# Patient Record
Sex: MALE | Race: WHITE | NOT HISPANIC OR LATINO | Employment: UNEMPLOYED | URBAN - METROPOLITAN AREA
[De-identification: names, ages, dates, MRNs, and addresses within clinical notes are randomized per-mention and may not be internally consistent; named-entity substitution may affect disease eponyms.]

---

## 2017-10-29 ENCOUNTER — HOSPITAL ENCOUNTER (EMERGENCY)
Facility: HOSPITAL | Age: 27
Discharge: HOME | End: 2017-10-29
Attending: EMERGENCY MEDICINE | Admitting: EMERGENCY MEDICINE

## 2017-10-29 VITALS
RESPIRATION RATE: 18 BRPM | OXYGEN SATURATION: 98 % | TEMPERATURE: 98.1 F | HEIGHT: 67 IN | DIASTOLIC BLOOD PRESSURE: 91 MMHG | WEIGHT: 152 LBS | SYSTOLIC BLOOD PRESSURE: 135 MMHG | HEART RATE: 124 BPM | BODY MASS INDEX: 23.86 KG/M2

## 2017-10-29 DIAGNOSIS — F14.10 COCAINE ABUSE (HCC): Primary | ICD-10-CM

## 2017-10-29 PROCEDURE — 99282 EMERGENCY DEPT VISIT SF MDM: CPT

## 2017-10-29 PROCEDURE — 99281 EMR DPT VST MAYX REQ PHY/QHP: CPT

## 2018-09-01 ENCOUNTER — HOSPITAL ENCOUNTER (EMERGENCY)
Facility: HOSPITAL | Age: 28
Discharge: HOME | End: 2018-09-01
Attending: EMERGENCY MEDICINE | Admitting: EMERGENCY MEDICINE

## 2018-09-01 VITALS
BODY MASS INDEX: 25.54 KG/M2 | OXYGEN SATURATION: 98 % | DIASTOLIC BLOOD PRESSURE: 92 MMHG | SYSTOLIC BLOOD PRESSURE: 131 MMHG | TEMPERATURE: 98.3 F | HEART RATE: 114 BPM | HEIGHT: 67 IN | WEIGHT: 162.7 LBS | RESPIRATION RATE: 20 BRPM

## 2018-09-01 DIAGNOSIS — M79.604 RIGHT LEG PAIN: Primary | ICD-10-CM

## 2018-09-01 LAB
ALBUMIN SERPL-MCNC: 4.7 G/DL (ref 3.5–5.2)
ALBUMIN/GLOB SERPL: 2 G/DL
ALP SERPL-CCNC: 56 U/L (ref 39–117)
ALT SERPL W P-5'-P-CCNC: 14 U/L (ref 1–41)
ANION GAP SERPL CALCULATED.3IONS-SCNC: 13.8 MMOL/L
AST SERPL-CCNC: 19 U/L (ref 1–40)
BASOPHILS # BLD AUTO: 0.03 10*3/MM3 (ref 0–0.2)
BASOPHILS NFR BLD AUTO: 0.5 % (ref 0–1.5)
BILIRUB SERPL-MCNC: 1 MG/DL (ref 0.1–1.2)
BUN BLD-MCNC: 13 MG/DL (ref 6–20)
BUN/CREAT SERPL: 12.4 (ref 7–25)
CALCIUM SPEC-SCNC: 8.8 MG/DL (ref 8.6–10.5)
CHLORIDE SERPL-SCNC: 104 MMOL/L (ref 98–107)
CK SERPL-CCNC: 137 U/L (ref 20–200)
CO2 SERPL-SCNC: 23.2 MMOL/L (ref 22–29)
CREAT BLD-MCNC: 1.05 MG/DL (ref 0.76–1.27)
D DIMER PPP FEU-MCNC: <0.27 MCGFEU/ML (ref 0–0.49)
DEPRECATED RDW RBC AUTO: 42.3 FL (ref 37–54)
EOSINOPHIL # BLD AUTO: 0.14 10*3/MM3 (ref 0–0.7)
EOSINOPHIL NFR BLD AUTO: 2.1 % (ref 0.3–6.2)
ERYTHROCYTE [DISTWIDTH] IN BLOOD BY AUTOMATED COUNT: 12.7 % (ref 11.5–14.5)
ERYTHROCYTE [SEDIMENTATION RATE] IN BLOOD: 1 MM/HR (ref 0–15)
GFR SERPL CREATININE-BSD FRML MDRD: 84 ML/MIN/1.73
GLOBULIN UR ELPH-MCNC: 2.4 GM/DL
GLUCOSE BLD-MCNC: 85 MG/DL (ref 65–99)
HCT VFR BLD AUTO: 44.7 % (ref 40.4–52.2)
HGB BLD-MCNC: 14.6 G/DL (ref 13.7–17.6)
IMM GRANULOCYTES # BLD: 0 10*3/MM3 (ref 0–0.03)
IMM GRANULOCYTES NFR BLD: 0 % (ref 0–0.5)
LYMPHOCYTES # BLD AUTO: 2.84 10*3/MM3 (ref 0.9–4.8)
LYMPHOCYTES NFR BLD AUTO: 43.5 % (ref 19.6–45.3)
MCH RBC QN AUTO: 29.7 PG (ref 27–32.7)
MCHC RBC AUTO-ENTMCNC: 32.7 G/DL (ref 32.6–36.4)
MCV RBC AUTO: 91 FL (ref 79.8–96.2)
MONOCYTES # BLD AUTO: 0.66 10*3/MM3 (ref 0.2–1.2)
MONOCYTES NFR BLD AUTO: 10.1 % (ref 5–12)
NEUTROPHILS # BLD AUTO: 2.86 10*3/MM3 (ref 1.9–8.1)
NEUTROPHILS NFR BLD AUTO: 43.8 % (ref 42.7–76)
PLATELET # BLD AUTO: 176 10*3/MM3 (ref 140–500)
PMV BLD AUTO: 11 FL (ref 6–12)
POTASSIUM BLD-SCNC: 3.7 MMOL/L (ref 3.5–5.2)
PROT SERPL-MCNC: 7.1 G/DL (ref 6–8.5)
RBC # BLD AUTO: 4.91 10*6/MM3 (ref 4.6–6)
SODIUM BLD-SCNC: 141 MMOL/L (ref 136–145)
WBC NRBC COR # BLD: 6.53 10*3/MM3 (ref 4.5–10.7)

## 2018-09-01 PROCEDURE — 85025 COMPLETE CBC W/AUTO DIFF WBC: CPT | Performed by: PHYSICIAN ASSISTANT

## 2018-09-01 PROCEDURE — 85379 FIBRIN DEGRADATION QUANT: CPT | Performed by: PHYSICIAN ASSISTANT

## 2018-09-01 PROCEDURE — 82550 ASSAY OF CK (CPK): CPT | Performed by: PHYSICIAN ASSISTANT

## 2018-09-01 PROCEDURE — 99283 EMERGENCY DEPT VISIT LOW MDM: CPT

## 2018-09-01 PROCEDURE — 80053 COMPREHEN METABOLIC PANEL: CPT | Performed by: PHYSICIAN ASSISTANT

## 2018-09-01 PROCEDURE — 85652 RBC SED RATE AUTOMATED: CPT | Performed by: PHYSICIAN ASSISTANT

## 2018-09-01 RX ORDER — NAPROXEN SODIUM 550 MG/1
550 TABLET ORAL 2 TIMES DAILY WITH MEALS
Qty: 14 TABLET | Refills: 0 | Status: SHIPPED | OUTPATIENT
Start: 2018-09-01 | End: 2022-02-02

## 2018-09-01 RX ORDER — TRAZODONE HYDROCHLORIDE 50 MG/1
50 TABLET ORAL NIGHTLY PRN
COMMUNITY
End: 2022-02-02

## 2018-09-01 RX ORDER — ARIPIPRAZOLE 2 MG/1
2 TABLET ORAL DAILY
COMMUNITY

## 2018-09-02 NOTE — ED PROVIDER NOTES
EMERGENCY DEPARTMENT ENCOUNTER    CHIEF COMPLAINT  Chief Complaint: Foot Swelling  History given by: Patient  History limited by:   Room Number: 18/18  PMD: Provider, No Known      HPI:  Pt is a 28 y.o. male who presents complaining of R foot swelling that was first noticed today. Pt reports that he went outdoors and felt an abnormal sensation in the foot. He reports that the foot felt cool. Pt denies any injury to the foot. He also reports some R calf cramping. Pt denies any SOA, but states some brief sharp chest pain. Pt reports IV cocaine use and states that his last use was 1 week ago.    Duration: PTA  Onset: sudden  Timing: constant  Location: R foot  Radiation: none  Quality: swelling  Intensity/Severity: moderate  Progression: unchanged  Associated Symptoms: abnormal sensation, CP  Aggravating Factors: none  Alleviating Factors: none  Previous Episodes: none  Treatment before arrival: none    PAST MEDICAL HISTORY  Active Ambulatory Problems     Diagnosis Date Noted   • No Active Ambulatory Problems     Resolved Ambulatory Problems     Diagnosis Date Noted   • No Resolved Ambulatory Problems     Past Medical History:   Diagnosis Date   • Bipolar 1 disorder (CMS/[facility])        PAST SURGICAL HISTORY  History reviewed. No pertinent surgical history.    FAMILY HISTORY  History reviewed. No pertinent family history.    SOCIAL HISTORY  Social History     Social History   • Marital status:      Spouse name: N/A   • Number of children: N/A   • Years of education: N/A     Occupational History   • Not on file.     Social History Main Topics   • Smoking status: Heavy Tobacco Smoker   • Smokeless tobacco: Not on file   • Alcohol use Yes      Comment: occasional   • Drug use: Yes     Types: Marijuana, Cocaine      Comment: heroin   • Sexual activity: Not on file     Other Topics Concern   • Not on file     Social History Narrative   • No narrative on file       ALLERGIES  Patient has no known allergies.    REVIEW OF  SYSTEMS  Review of Systems   Constitutional: Negative.  Negative for chills and fever.   HENT: Negative.  Negative for sore throat.    Eyes: Negative.    Respiratory: Negative.  Negative for cough.    Cardiovascular: Positive for chest pain and leg swelling (R foot).   Gastrointestinal: Negative.    Genitourinary: Negative.  Negative for dysuria.   Musculoskeletal: Positive for myalgias (calves). Negative for back pain.   Skin: Negative.  Negative for rash.        Cool to touch   Neurological: Negative.  Negative for headaches.   All other systems reviewed and are negative.      PHYSICAL EXAM  ED Triage Vitals [09/01/18 2006]   Temp Pulse Resp BP SpO2   98.3 °F (36.8 °C) -- 18 -- 99 %      Temp src Heart Rate Source Patient Position BP Location FiO2 (%)   -- Monitor -- -- --       Physical Exam   Constitutional: He is oriented to person, place, and time. No distress.   HENT:   Head: Normocephalic and atraumatic.   Eyes: Pupils are equal, round, and reactive to light. EOM are normal.   Neck: Normal range of motion. Neck supple.   Cardiovascular: Normal rate, regular rhythm and normal heart sounds.    Pulmonary/Chest: Effort normal and breath sounds normal. No respiratory distress.   Abdominal: Soft. There is no tenderness. There is no rebound and no guarding.   Musculoskeletal: Normal range of motion. He exhibits no edema or tenderness (no calf tenderness).   Neurological: He is alert and oriented to person, place, and time. He has normal sensation and normal strength.   Skin: Skin is warm and dry.   Psychiatric: Mood and affect normal.   Nursing note and vitals reviewed.      LAB RESULTS  Lab Results (last 24 hours)     Procedure Component Value Units Date/Time    CBC & Differential [[id number]] Collected:  09/01/18 2041    Specimen:  Blood Updated:  09/01/18 2049    Narrative:       The following orders were created for panel order CBC & Differential.  Procedure                               Abnormality          Status                     ---------                               -----------         ------                     CBC Auto Differential[[id number]]         Normal              Final result                 Please view results for these tests on the individual orders.    Comprehensive Metabolic Panel [[id number]] Collected:  09/01/18 2041    Specimen:  Blood Updated:  09/01/18 2111     Glucose 85 mg/dL      BUN 13 mg/dL      Creatinine 1.05 mg/dL      Sodium 141 mmol/L      Potassium 3.7 mmol/L      Chloride 104 mmol/L      CO2 23.2 mmol/L      Calcium 8.8 mg/dL      Total Protein 7.1 g/dL      Albumin 4.70 g/dL      ALT (SGPT) 14 U/L      AST (SGOT) 19 U/L      Alkaline Phosphatase 56 U/L      Total Bilirubin 1.0 mg/dL      eGFR Non African Amer 84 mL/min/1.73      Globulin 2.4 gm/dL      A/G Ratio 2.0 g/dL      BUN/Creatinine Ratio 12.4     Anion Gap 13.8 mmol/L     CK [[id number]]  (Normal) Collected:  09/01/18 2041    Specimen:  Blood Updated:  09/01/18 2111     Creatine Kinase 137 U/L     Sedimentation Rate [[id number]]  (Normal) Collected:  09/01/18 2041    Specimen:  Blood Updated:  09/01/18 2107     Sed Rate 1 mm/hr     D-dimer, Quantitative [[id number]]  (Normal) Collected:  09/01/18 2041    Specimen:  Blood Updated:  09/01/18 2103     D-Dimer, Quantitative <0.27 MCGFEU/mL     Narrative:       The Stago D-Dimer test used in conjunction with a clinical pretest probability (PTP) assessment model, has been approved by the FDA to rule out the presence of venous thromboembolism (VTE) in outpatients suspected of deep venous thrombosis (DVT) or pulmonary embolism (PE).     CBC Auto Differential [[id number]]  (Normal) Collected:  09/01/18 2041    Specimen:  Blood Updated:  09/01/18 2049     WBC 6.53 10*3/mm3      RBC 4.91 10*6/mm3      Hemoglobin 14.6 g/dL      Hematocrit 44.7 %      MCV 91.0 fL      MCH 29.7 pg      MCHC 32.7 g/dL      RDW 12.7 %      RDW-SD 42.3 fl      MPV 11.0 fL      Platelets 176 10*3/mm3      Neutrophil %  43.8 %      Lymphocyte % 43.5 %      Monocyte % 10.1 %      Eosinophil % 2.1 %      Basophil % 0.5 %      Immature Grans % 0.0 %      Neutrophils, Absolute 2.86 10*3/mm3      Lymphocytes, Absolute 2.84 10*3/mm3      Monocytes, Absolute 0.66 10*3/mm3      Eosinophils, Absolute 0.14 10*3/mm3      Basophils, Absolute 0.03 10*3/mm3      Immature Grans, Absolute 0.00 10*3/mm3           I ordered the above labs and reviewed the results    PROCEDURES  Procedures      PROGRESS AND CONSULTS     8:34 PM  Reviewed pt's history and workup with Dr. Laurent.  After a bedside evaluation; Dr Laurent agrees with the plan of care  9:20 PM  Labs are unremarkable. Will discharge.       MEDICAL DECISION MAKING  Results were reviewed/discussed with the patient and they were also made aware of online access. Pt also made aware that some labs, such as cultures, will not be resulted during ER visit and follow up with PMD is necessary.     MDM  Number of Diagnoses or Management Options  Right leg pain:      Amount and/or Complexity of Data Reviewed  Clinical lab tests: ordered and reviewed           DIAGNOSIS  Final diagnoses:   Right leg pain       DISPOSITION  DISCHARGE    Patient discharged in stable condition.    Reviewed implications of results, diagnosis, meds, responsibility to follow up, warning signs and symptoms of possible worsening, potential complications and reasons to return to ER.    Patient/Family voiced understanding of above instructions.    Discussed plan for discharge, as there is no emergent indication for admission. Patient referred to primary care provider for BP management due to today's BP. Pt/family is agreeable and understands need for follow up and repeat testing.  Pt is aware that discharge does not mean that nothing is wrong but it indicates no emergency is present that requires admission and they must continue care with follow-up as given below or physician of their choice.     FOLLOW-UP  PATIENT LIAISON  [facility] [id number]  [phone]  Schedule an appointment as soon as possible for a visit   As needed         Medication List      New Prescriptions    naproxen sodium 550 MG tablet  Commonly known as:  ANAPROX  Take 1 tablet by mouth 2 (Two) Times a Day With Meals.              Latest Documented Vital Signs:  As of 6:05 AM  BP- 131/92 HR- 114 Temp- 98.3 °F (36.8 °C) O2 sat- 98%    --  Documentation assistance provided by wing Zepeda for Marky Almanza PA-C.  Information recorded by the wing was done at my direction and has been verified and validated by me.     Shashank Zepeda  09/01/18 2047       Shashank Zepeda  09/01/18 2121       Jose Almazna III, PA  09/02/18 0605

## 2018-09-02 NOTE — ED PROVIDER NOTES
Pt presents to the ED c/o right foot swelling that he noticed today. Pt states that he went outside barefoot, and he noticed that the concrete felt normal under his left foot, but cool under the right foot. Pt also reports redness in the right foot and cramping in bilateral calves. He denies fever, chills, or SOA. Hx of IV cocaine use. On exam, Pt is resting comfortably, in no distress, and without focal neurologic deficit. Heart is tachycardic with a normal rhythm. Normal distal pulses. There is no edema or erythema to the right foot. I agree with the plan for labs.     Attestation:  The MICHELLE and I have discussed this patient's history, physical exam, and treatment plan.  I have reviewed the documentation and personally had a face to face interaction with the patient. I affirm the documentation and agree with the treatment and plan.  The attached note describes my personal findings.      Documentation assistance provided by wing Beaver for Dr Laurent Information recorded by the scribe was done at my direction and has been verified and validated by me.     Melany Beaver  09/01/18 0379       Maco Laurent MD  09/02/18 0834

## 2021-07-15 ENCOUNTER — HOSPITAL ENCOUNTER (EMERGENCY)
Facility: HOSPITAL | Age: 31
Discharge: HOME | End: 2021-07-15
Attending: EMERGENCY MEDICINE | Admitting: EMERGENCY MEDICINE

## 2021-07-15 VITALS
HEART RATE: 110 BPM | DIASTOLIC BLOOD PRESSURE: 75 MMHG | HEIGHT: 67 IN | OXYGEN SATURATION: 95 % | BODY MASS INDEX: 26.68 KG/M2 | RESPIRATION RATE: 16 BRPM | WEIGHT: 170 LBS | SYSTOLIC BLOOD PRESSURE: 111 MMHG | TEMPERATURE: 98.3 F

## 2021-07-15 DIAGNOSIS — R21 RASH: Primary | ICD-10-CM

## 2021-07-15 PROCEDURE — 99282 EMERGENCY DEPT VISIT SF MDM: CPT

## 2021-07-15 NOTE — ED PROVIDER NOTES
EMERGENCY DEPARTMENT ENCOUNTER  Patient was placed in face mask in first look and the following protective measures were taken unless additional measures were taken and documented below in the ED course. Patient was wearing facemask when I entered the room and throughout our encounter. I wore full protective equipment throughout this patient encounter including a face mask, and gloves. Hand hygiene was performed before donning protective equipment and after removal when leaving the room.    Room Number:  34/34  Date of encounter:  7/15/2021  PCP: Provider, No Known    HPI:  Context: Davin Booker is a 31 y.o. male who presents to the ED c/o chief complaint of rash.  Patient complains of rash that he noticed upon waking this morning, rash was on his lower extremities and legs.  Patient reports extremely fine, difficult to see, did not itch, was not painful.  Patient reports that rash has now resolved.  Patient denies any swelling of tongue lip or throat, no difficulty breathing, no nausea or vomiting.    MEDICAL HISTORY REVIEW  Reviewed in EPIC    PAST MEDICAL HISTORY  Active Ambulatory Problems     Diagnosis Date Noted   • No Active Ambulatory Problems     Resolved Ambulatory Problems     Diagnosis Date Noted   • No Resolved Ambulatory Problems     Past Medical History:   Diagnosis Date   • Bipolar 1 disorder (CMS/[facility])        PAST SURGICAL HISTORY  No past surgical history on file.    FAMILY HISTORY  No family history on file.    SOCIAL HISTORY  Social History     Socioeconomic History   • Marital status:      Spouse name: Not on file   • Number of children: Not on file   • Years of education: Not on file   • Highest education level: Not on file   Tobacco Use   • Smoking status: Heavy Tobacco Smoker   Substance and Sexual Activity   • Alcohol use: Yes     Comment: occasional   • Drug use: Yes     Types: Marijuana, Cocaine(coke)     Comment: heroin       ALLERGIES  Patient has no known allergies.    The  patient's allergies have been reviewed    REVIEW OF SYSTEMS  All systems reviewed and negative except for those discussed in HPI.     PHYSICAL EXAM  I have reviewed the triage vital signs and nursing notes.  ED Triage Vitals [07/15/21 0730]   Temp Heart Rate Resp BP SpO2   98.3 °F (36.8 °C) 118 16 -- 98 %      Temp src Heart Rate Source Patient Position BP Location FiO2 (%)   -- -- -- -- --       General: No acute distress.  HENT: NCAT, PERRL, Nares patent.  Eyes: no scleral icterus.  Neck: trachea midline, no ROM limitations.  CV: regular rhythm, regular rate.  Respiratory: normal effort, CTAB.  Abdomen: soft, nondistended, NTTP, no rebound tenderness, no guarding or rigidity  : deferred.  Musculoskeletal: no deformity.  Neuro: alert, moves all extremities, follows commands.  Skin: warm, dry.  No visible rash.  2 small scabs on right anterior shin.    LAB RESULTS  No results found for this or any previous visit (from the past 24 hour(s)).    I ordered the above labs and reviewed the results.    RADIOLOGY  No Radiology Exams Resulted Within Past 24 Hours    I ordered the above noted radiological studies. I reviewed the images and results. I agree with the radiologist interpretation.    PROCEDURES  Procedures    MEDICATIONS GIVEN IN ER  Medications - No data to display    PROGRESS, DATA ANALYSIS, CONSULTS, AND MEDICAL DECISION MAKING  A complete history and physical exam have been performed.  All available laboratory and imaging results have been reviewed by myself prior to disposition.    MDM  After the initial H&P, I discussed pertinent information from history and physical exam with patient/family.  Discussed differential diagnosis.  Discussed plan for ED evaluation/work-up/treatment.  All questions answered.  Patient/family is agreeable with plan.  ED Course as of Jul 15 0819   Thu Jul 15, 2021   0817 Patient presented with rash, rash is now resolved, no alarm signs or symptoms, patient well-appearing, denies  any other complaint.  Given extensive discussion return precautions, discharging with primary care follow-up.    [JG]      ED Course User Index  [JG] Scott Smiley MD       AS OF 08:19 EDT VITALS:    BP -    HR - 118  TEMP - 98.3 °F (36.8 °C)  O2 SATS - 98%    DIAGNOSIS  Final diagnoses:   Rash         DISPOSITION  DISCHARGE    Patient discharged in stable condition.    Reviewed implications of results, diagnosis, meds, responsibility to follow up, warning signs and symptoms of possible worsening, potential complications and reasons to return to ER.    Patient/Family voiced understanding of above instructions.    Discussed plan for discharge, as there is no emergent indication for admission. Patient referred to primary care provider for BP management due to today's BP. Pt/family is agreeable and understands need for follow up and repeat testing.  Pt is aware that discharge does not mean that nothing is wrong but it indicates no emergency is present that requires admission and they must continue care with follow-up as given below or physician of their choice.     FOLLOW-UP  Your PCP    Schedule an appointment as soon as possible for a visit in 2 days  even if well    PATIENT CONNECTION - [facility] [id number] [phone]    if you are unable to follow up with your PCP         Medication List      No changes were made to your prescriptions during this visit.          Scott Smiley MD  07/15/21 6682

## 2021-07-15 NOTE — ED NOTES
Pt complains of rash on body.  Denies pain or discomfort.  Pt states he has had rash for several hours.  Pt falling asleep while speaking with triage nurse.  Mask placed on patient in triage.  Triage RN wearing mask throughout encounter.       Chapo Bell RN  07/15/21 5173

## 2021-07-15 NOTE — ED NOTES
"Upon entry to ER room, pt appears drowsy and not answering direct questions. MD was able to get patient to answer questions, and pt reports \"I mean sometimes you can see the rash, just not in the direct light\". Pt fell back asleep while this RN was trying to complete triage. Pt is A&OX4 when he does wake up. Pt reports he recently relapsed on cocaine last night, \"and I'm just tired now\". PT has no rash noted by this RN or MD on body at this time.     Patient was placed in face mask during first look triage.  Patient was wearing a face mask throughout encounter.  I wore personal protective equipment throughout the encounter.  Hand hygiene was performed before and after patient encounter.              Ela Jalloh, RN  07/15/21 0806    "

## 2021-08-04 ENCOUNTER — HOSPITAL ENCOUNTER (EMERGENCY)
Facility: HOSPITAL | Age: 31
Discharge: LEFT BEFORE DISCHARGE | End: 2021-08-04

## 2021-08-04 VITALS
DIASTOLIC BLOOD PRESSURE: 81 MMHG | RESPIRATION RATE: 16 BRPM | TEMPERATURE: 98 F | HEART RATE: 125 BPM | SYSTOLIC BLOOD PRESSURE: 182 MMHG | OXYGEN SATURATION: 96 %

## 2021-08-04 PROCEDURE — 99211 OFF/OP EST MAY X REQ PHY/QHP: CPT

## 2021-08-04 NOTE — ED NOTES
"Pt reports he is in recovery for IV cocaine. Pt reports he relapsed and \"did a gram a couple of hours ago. It feels like something is very wrong. I have sores popping up on my body and my veins are popping out.\"     Ninfa Sharma, RN  08/04/21 0949    "

## 2022-02-02 ENCOUNTER — TELEPHONE (OUTPATIENT)
Dept: FAMILY MEDICINE CLINIC | Facility: CLINIC | Age: 32
End: 2022-02-02

## 2022-02-02 ENCOUNTER — OFFICE VISIT (OUTPATIENT)
Dept: FAMILY MEDICINE CLINIC | Facility: CLINIC | Age: 32
End: 2022-02-02

## 2022-02-02 VITALS
WEIGHT: 175 LBS | TEMPERATURE: 97.5 F | HEART RATE: 97 BPM | HEIGHT: 67 IN | SYSTOLIC BLOOD PRESSURE: 110 MMHG | BODY MASS INDEX: 27.47 KG/M2 | OXYGEN SATURATION: 99 % | DIASTOLIC BLOOD PRESSURE: 70 MMHG

## 2022-02-02 DIAGNOSIS — F98.8 ATTENTION DEFICIT DISORDER, UNSPECIFIED HYPERACTIVITY PRESENCE: Primary | ICD-10-CM

## 2022-02-02 DIAGNOSIS — F31.9 BIPOLAR 1 DISORDER: ICD-10-CM

## 2022-02-02 PROCEDURE — 99204 OFFICE O/P NEW MOD 45 MIN: CPT | Performed by: NURSE PRACTITIONER

## 2022-02-02 RX ORDER — DEXTROAMPHETAMINE SACCHARATE, AMPHETAMINE ASPARTATE MONOHYDRATE, DEXTROAMPHETAMINE SULFATE AND AMPHETAMINE SULFATE 7.5; 7.5; 7.5; 7.5 MG/1; MG/1; MG/1; MG/1
30 CAPSULE, EXTENDED RELEASE ORAL 2 TIMES DAILY
COMMUNITY

## 2022-02-02 NOTE — PROGRESS NOTES
Chief Complaint  Establish Care and ADD    Subjective          Davin presents to [facility]    31 year old male presented to the clinic for establishment of care and wanting to restart medications  He states that he has a history of Bipolar 1 disorder and ADD.  States that he moved her from [place] and has not been on any of his medication for several months.  He is going through a divorce and would like to restart medication.  He is unemployed and does not have insurance at the time and is worried about being able to keep up with Psychiatry.  He has been on a variety of different medications in the past and felt like these controlled his symptoms well.      PHQ-9 Depression Screening  Little interest or pleasure in doing things?  2   Feeling down, depressed, or hopeless?  2   Trouble falling or staying asleep, or sleeping too much?  2   Feeling tired or having little energy?  2   Poor appetite or overeating?  0   Feeling bad about yourself - or that you are a failure or have let yourself or your family down?  1   Trouble concentrating on things, such as reading the newspaper or watching television?  2   Moving or speaking so slowly that other people could have noticed? Or the opposite - being so fidgety or restless that you have been moving around a lot more than usual?  1   Thoughts that you would be better off dead, or of hurting yourself in some way?  0   PHQ-9 Total Score  10   If you checked off any problems, how difficult have these problems made it for you to do your work, take care of things at home, or get along with other people?  somewhat difficult         He also has a history of substance abuse-  States that he is not using any medication/ Drugs prescribed or recreational at this time.    He has no other C/o today      Review of Systems   Constitutional: Negative for chills, fatigue and fever.   Eyes: Negative for visual disturbance.   Respiratory: Negative for cough  "and shortness of breath.    Cardiovascular: Negative for chest pain, palpitations and leg swelling.   Gastrointestinal: Negative for abdominal pain, diarrhea, nausea and vomiting.   Neurological: Negative for dizziness and light-headedness.   Psychiatric/Behavioral: Positive for agitation, decreased concentration, dysphoric mood and sleep disturbance. Negative for self-injury and suicidal ideas. The patient is nervous/anxious.         Objective   Vital Signs:   Vitals:    02/02/22 0921   BP: 110/70   Pulse: 97   Temp: 97.5 °F (36.4 °C)   SpO2: 99%   Weight: 79.4 kg (175 lb)   Height: 170 cm (66.93\")        Physical Exam  Vitals reviewed.   Constitutional:       Appearance: Normal appearance. He is not ill-appearing.   HENT:      Head: Normocephalic.   Cardiovascular:      Rate and Rhythm: Normal rate and regular rhythm.      Pulses: Normal pulses.      Heart sounds: Normal heart sounds. No murmur heard.      Pulmonary:      Effort: Pulmonary effort is normal. No respiratory distress.      Breath sounds: Normal breath sounds. No stridor. No wheezing, rhonchi or rales.   Chest:      Chest wall: No tenderness.   Skin:     General: Skin is warm.   Neurological:      Mental Status: He is alert and oriented to person, place, and time.   Psychiatric:         Mood and Affect: Mood normal.         Behavior: Behavior normal.         Thought Content: Thought content normal.         Judgment: Judgment normal.          Result Review :     The following data was reviewed by: LETY Bboo on 02/02/2022:  Urine Drug Screen - (07/29/2021 15:36)- negative  Urine Drug Screen - (06/29/2021 17:25)- positive for benzo, THC, cocaine      Reviewed previous ER visit notes and Substance abuse         Assessment and Plan    Diagnoses and all orders for this visit:    1. Attention deficit disorder, unspecified hyperactivity presence (Primary)  -     Ambulatory Referral to Psychiatry  -     Urine Drug Screen - Urine, Clean Catch; " Future  -     Comprehensive Metabolic Panel  -     CBC & Differential  -     Lipid Panel  -     TSH  -     T4, Free    2. Bipolar 1 disorder (HCC)  -     Ambulatory Referral to Psychiatry  -     Urine Drug Screen - Urine, Clean Catch; Future  -     Comprehensive Metabolic Panel  -     CBC & Differential  -     Lipid Panel  -     TSH  -     T4, Free      Plan  Denies any SI/SP  Referral made to Psych-  List provided  Reviewed need to negative drug screen and records of diagnosis from Psych if wanting medication refills  To [facility] if any SI/SP/HI prior to getting in to psych  Denies all today  Declined all labs today related to self pay    Follow Up   Return in about 1 month (around 3/2/2022).  Patient was given instructions and counseling regarding his condition or for health maintenance advice. Please see specific information pulled into the AVS if appropriate.

## 2022-02-02 NOTE — PATIENT INSTRUCTIONS
Bipolar 1 Disorder  Bipolar 1 disorder is a mental health disorder in which a person has episodes of emotional highs, or robert, and may also have episodes of lows, or depression. Bipolar 1 disorder is different from other bipolar disorders in that it involves extreme episodes of robert (manic episodes). These episodes last at least one week or involve symptoms that are so severe that hospitalization is needed to keep the person safe.  What are the causes?  The cause of this condition is not known.  What increases the risk?  The following factors may make you more likely to develop this condition:  · Having a family member with the disorder.  · Having an imbalance of certain chemicals in the brain (neurotransmitters).  · Experiencing stress, such as illness, financial problems, or a death.  · Having certain conditions that affect the brain or spinal cord (neurologic conditions).  · Having had a brain injury (trauma).  What are the signs or symptoms?  Symptoms of robert include:  · Very high self-esteem or self-confidence.  · Decreased need for sleep.  · Unusual talkativeness. Speech may be very fast.  · Racing thoughts with quick shifts between topics that may or may not be related (flight of ideas).  · Ability to concentrate either greatly improved or decreased.  · Increased purposeful activity, such as work, studies, or social activity.  · Increased agitation. This could be pacing, squirming, fidgeting, or finger and toe tapping.  · Impulsive behavior and poor judgment. This may result in high-risk activities that are sexual, financial, or physical.  Symptoms of depression include:  · Extreme degrees of sadness, uncontrollable crying, hopelessness, worthlessness, or numbness.  · Sleep problems, such as insomnia, waking early, or sleeping too much.  · No longer enjoying things you used to enjoy.  · Isolation. You may often spend time alone.  · Lack of energy or motivation, and moving more slowly than  normal.  · Trouble making decisions.  · Increased appetite or loss of appetite.  · Thoughts of death, or wanting to harm yourself.  Sometimes, you may have a mixed mood. This means having symptoms of robert and depression at the same time. Stress can often trigger these symptoms.  How is this diagnosed?  This condition may be diagnosed based on:  · Emotional episodes.  · Medical history.  · Use of alcohol, drugs, and prescription medicines. Certain medical conditions and substances can cause symptoms that seem like bipolar disorder. This is called secondary bipolar disorder.  Your health care provider may ask you to take a short test. This helps to understand your symptoms. You may also be asked to see a mental health specialist to follow up on this diagnosis and start treatment.  How is this treated?         This condition is a long-term (chronic) illness. It is often managed with ongoing treatment rather than treatment only when symptoms occur. A combination of treatments is the main approach. Treatment may include:  · Medicine. Medicine can be prescribed by a health care provider who specializes in treating mental health disorders (psychiatrist). Medicines called mood stabilizers are usually prescribed. If symptoms occur during treatment with a mood stabilizer, other medicines may be added.  · Psychotherapy. Some forms of talk therapy, such as cognitive behavioral therapy (CBT) and family therapy, can help with learning to manage bipolar disorder.  · Psychoeducation. This helps you and others understand how this disorder is managed. Include friends and family in educational sessions so they learn how best to support you.  · Methods of managing your condition, such as journaling or relaxation exercises. Relaxation exercises include:  ? Yoga.  ? Meditation.  ? Deep breathing.  · Lifestyle changes, such as:  ? Limiting alcohol and drug use.  ? Exercising regularly.  ? Structuring when you go to bed and get  up.  ? Eating a healthy diet.  · Electroconvulsive therapy (ECT). This is a procedure in which electricity is applied to the brain through the scalp. It may be used in cases of severe bipolar disorder when medicine and psychotherapy work too slowly or do not work.  Follow these instructions at home:  Activity  · Return to your normal activities as told by your health care provider.  · Find activities that you enjoy, and make time to do them.  · Exercise regularly as told by your health care provider.  Lifestyle    · Follow a set schedule for eating and sleeping.  · Eat a healthy diet that includes fresh fruits and vegetables, whole grains, low-fat dairy, and lean meat.  · Get at least 7-8 hours of sleep each night.  · Avoid using products that contain nicotine or tobacco. If you want help quitting, ask your health care provider.  · Do not use drugs.    Alcohol use  · Do not drink alcohol if:  ? Your health care provider tells you not to drink.  ? You are pregnant, may be pregnant, or are planning to become pregnant.  · If you drink alcohol:  ? Limit how much you use to:  § 0-1 drink a day for women.  § 0-2 drinks a day for men.  ? Be aware of how much alcohol is in your drink. In the [place] one drink equals one 12 oz bottle of beer (355 mL), one 5 oz glass of wine (148 mL), or one 1½ oz glass of hard liquor (44 mL).  General instructions  · Take over-the-counter and prescription medicines only as told by your health care provider. You may think about stopping your medicine, but it is very important to take all your medicine as prescribed.  · Consider joining a support group. Your health care provider may be able to recommend one.  · Talk with your family and friends about your treatment goals and how they can help.  · Keep all follow-up visits as told by your health care provider. This is important.  Where to find more information  · National [place] on Mental Illness: www.manuel.org  · National [place] of Mental  Health: www.[facility].gov  Contact a health care provider if:  · Your symptoms get worse, or your loved ones tell you that your symptoms are getting worse.  · You have uncomfortable side effects from your medicine.  · You have trouble sleeping.  · You have trouble doing daily activities.  · You feel unsafe in your surroundings.  · You are self-medicating with alcohol or drugs.  Get help right away if:  · You have new symptoms.  · You have thoughts about harming yourself or others.  · You are considering suicide.  If you ever feel like you may hurt yourself or others, or have thoughts about taking your own life, get help right away. You can go to your nearest emergency department or call:  · Your local emergency services (911 in the [place]  · A suicide crisis helpline, such as the National Suicide Prevention Lifeline at 1-696.627.1516. This is open 24 hours a day.  Summary  · Bipolar 1 disorder is a lifelong mental health disorder in which a person has episodes of robert and depression.  · This disorder is mainly treated with a combination of medicines, talk and behavioral therapies, and, often, electroconvulsive therapy (ECT).  · Include friends and family in educational sessions so they know how best to support you.  · Get help right away if you are considering suicide.  This information is not intended to replace advice given to you by your health care provider. Make sure you discuss any questions you have with your health care provider.  Document Revised: 06/02/2020 Document Reviewed: 06/02/2020  [employer] Patient Education © 2021 [employer]  Living With Attention Deficit Hyperactivity Disorder  If you have been diagnosed with attention deficit hyperactivity disorder (ADHD), you may be relieved that you now know why you have felt or behaved a certain way. Still, you may feel overwhelmed about the treatment ahead. You may also wonder how to get the support you need and how to deal with the condition day-to-day.  With treatment and support, you can live with ADHD and manage your symptoms.  How to manage lifestyle changes  Managing stress  Stress is your body's reaction to life changes and events, both good and bad. To cope with the stress of an ADHD diagnosis, it may help to:  · Learn more about ADHD.  · Exercise regularly. Even a short daily walk can lower stress levels.  · Participate in training or education programs (including social skills training classes) that teach you to deal with symptoms.    Medicines  Your health care provider may suggest certain medicines if he or she feels that they will help to improve your condition. Stimulant medicines are usually prescribed to treat ADHD, and therapy may also be prescribed. It is important to:  · Avoid using alcohol and other substances that may prevent your medicines from working properly (may interact).  · Talk with your pharmacist or health care provider about all the medicines that you take, their possible side effects, and what medicines are safe to take together.  · Make it your goal to take part in all treatment decisions (shared decision-making). Ask about possible side effects of medicines that your health care provider recommends, and tell him or her how you feel about having those side effects. It is best if shared decision-making with your health care provider is part of your total treatment plan.  Relationships  To strengthen your relationships with family members while treating your condition, consider taking part in family therapy. You might also attend self-help groups alone or with a loved one.  Be honest about how your symptoms affect your relationships. Make an effort to communicate respectfully instead of fighting, and find ways to show others that you care. Psychotherapy may be useful in helping you cope with how ADHD affects your relationships.  How to recognize changes in your condition  The following signs may mean that your treatment is working well  and your condition is improving:  · Consistently being on time for appointments.  · Being more organized at home and work.  · Other people noticing improvements in your behavior.  · Achieving goals that you set for yourself.  · Thinking more clearly.  The following signs may mean that your treatment is not working very well:  · Feeling impatience or more confusion.  · Missing, forgetting, or being late for appointments.  · An increasing sense of disorganization and messiness.  · More difficulty in reaching goals that you set for yourself.  · Loved ones becoming angry or frustrated with you.  Follow these instructions at home:  · Take over-the-counter and prescription medicines only as told by your health care provider. Check with your health care provider before taking any new medicines.  · Create structure and an organized atmosphere at home. For example:  ? Make a list of tasks, then rank them from most important to least important. Work on one task at a time until your listed tasks are done.  ? Make a daily schedule and follow it consistently every day.  ? Use an appointment calendar, and check it 2 or 3 times a day to keep on track. Keep it with you when you leave the house.  ? Create spaces where you keep certain things, and always put things back in their places after you use them.  · Keep all follow-up visits as told by your health care provider. This is important.  Where to find support  Talking to others    · Keep emotion out of important discussions and speak in a calm, logical way.  · Listen closely and patiently to your loved ones. Try to understand their point of view, and try to avoid getting defensive.  · Take responsibility for the consequences of your actions.  · Ask that others do not take your behaviors personally.  · Aim to solve problems as they come up, and express your feelings instead of bottling them up.  · Talk openly about what you need from your loved ones and how they can support  you.  · Consider going to family therapy sessions or having your family meet with a specialist who deals with ADHD-related behavior problems.    Finances  Not all insurance plans cover mental health care, so it is important to check with your insurance carrier. If paying for co-pays or counseling services is a problem, search for a local or [facility] mental health care center. Public mental health care services may be offered there at a low cost or no cost when you are not able to see a private health care provider.  If you are taking medicine for ADHD, you may be able to get the generic form, which may be less expensive than brand-name medicine. Some makers of prescription medicines also offer help to patients who cannot afford the medicines that they need.  Questions to ask your health care provider:  · What are the risks and benefits of taking medicines?  · Would I benefit from therapy?  · How often should I follow up with a health care provider?  Contact a health care provider if:  · You have side effects from your medicines, such as:  ? Repeated muscle twitches, coughing, or speech outbursts.  ? Sleep problems.  ? Loss of appetite.  ? Depression.  ? New or worsening behavior problems.  ? Dizziness.  ? Unusually fast heartbeat.  ? Stomach pains.  ? Headaches.  Get help right away if:  · You have a severe reaction to a medicine.  · Your behavior suddenly gets worse.  Summary  · With treatment and support, you can live with ADHD and manage your symptoms.  · The medicines that are most often prescribed for ADHD are stimulants.  · Consider taking part in family therapy or self-help groups with family members or friends.  · When you talk with friends and family about your ADHD, be patient and communicate openly.  · Take over-the-counter and prescription medicines only as told by your health care provider. Check with your health care provider before taking any new medicines.  This information is not intended to replace  advice given to you by your health care provider. Make sure you discuss any questions you have with your health care provider.  Document Revised: 06/02/2021 Document Reviewed: 06/02/2021  [employer] Patient Education © 2021 [employer]
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
## 2023-01-09 ENCOUNTER — HOSPITAL ENCOUNTER (INPATIENT)
Age: 33
LOS: 3 days | Discharge: HOME | DRG: 885 | End: 2023-01-12
Attending: PSYCHIATRY & NEUROLOGY | Admitting: PSYCHIATRY & NEUROLOGY

## 2023-01-09 ENCOUNTER — HOSPITAL ENCOUNTER (EMERGENCY)
Age: 33
Discharge: OTHER INSTITUTION | End: 2023-01-09
Attending: EMERGENCY MEDICINE

## 2023-01-09 VITALS
TEMPERATURE: 98 F | RESPIRATION RATE: 16 BRPM | SYSTOLIC BLOOD PRESSURE: 104 MMHG | OXYGEN SATURATION: 97 % | HEART RATE: 86 BPM | HEIGHT: 67 IN | WEIGHT: 174 LBS | BODY MASS INDEX: 27.31 KG/M2 | DIASTOLIC BLOOD PRESSURE: 62 MMHG

## 2023-01-09 DIAGNOSIS — T14.91XA SUICIDE ATTEMPT (HCC): Primary | ICD-10-CM

## 2023-01-09 DIAGNOSIS — Z86.59 HISTORY OF BIPOLAR DISORDER: ICD-10-CM

## 2023-01-09 LAB
ALBUMIN SERPL-MCNC: 4.7 G/DL (ref 3.5–5)
ALBUMIN/GLOB SERPL: 1.5 (ref 1.1–2.2)
ALP SERPL-CCNC: 74 U/L (ref 45–117)
ALT SERPL-CCNC: 31 U/L (ref 12–78)
AMPHET UR QL SCN: POSITIVE
ANION GAP SERPL CALC-SCNC: 11 MMOL/L (ref 5–15)
APAP SERPL-MCNC: <2 UG/ML (ref 10–30)
APPEARANCE UR: ABNORMAL
AST SERPL-CCNC: 37 U/L (ref 15–37)
BACTERIA URNS QL MICRO: NEGATIVE /HPF
BARBITURATES UR QL SCN: NEGATIVE
BASOPHILS # BLD: 0 K/UL (ref 0–0.1)
BASOPHILS NFR BLD: 0 % (ref 0–1)
BENZODIAZ UR QL: NEGATIVE
BILIRUB SERPL-MCNC: 1.7 MG/DL (ref 0.2–1)
BILIRUB UR QL: NEGATIVE
BUN SERPL-MCNC: 20 MG/DL (ref 6–20)
BUN/CREAT SERPL: 16 (ref 12–20)
CALCIUM SERPL-MCNC: 9.2 MG/DL (ref 8.5–10.1)
CANNABINOIDS UR QL SCN: POSITIVE
CHLORIDE SERPL-SCNC: 104 MMOL/L (ref 97–108)
CO2 SERPL-SCNC: 23 MMOL/L (ref 21–32)
COCAINE UR QL SCN: NEGATIVE
COLOR UR: ABNORMAL
COMMENT, HOLDF: NORMAL
CREAT SERPL-MCNC: 1.23 MG/DL (ref 0.7–1.3)
DIFFERENTIAL METHOD BLD: NORMAL
DRUG SCRN COMMENT,DRGCM: ABNORMAL
EOSINOPHIL # BLD: 0.1 K/UL (ref 0–0.4)
EOSINOPHIL NFR BLD: 1 % (ref 0–7)
EPITH CASTS URNS QL MICRO: ABNORMAL /LPF
ERYTHROCYTE [DISTWIDTH] IN BLOOD BY AUTOMATED COUNT: 12.1 % (ref 11.5–14.5)
ETHANOL SERPL-MCNC: 84 MG/DL
FLUAV RNA SPEC QL NAA+PROBE: NOT DETECTED
FLUBV RNA SPEC QL NAA+PROBE: NOT DETECTED
GLOBULIN SER CALC-MCNC: 3.1 G/DL (ref 2–4)
GLUCOSE SERPL-MCNC: 67 MG/DL (ref 65–100)
GLUCOSE UR STRIP.AUTO-MCNC: NEGATIVE MG/DL
HCT VFR BLD AUTO: 46.6 % (ref 36.6–50.3)
HGB BLD-MCNC: 16 G/DL (ref 12.1–17)
HGB UR QL STRIP: ABNORMAL
HYALINE CASTS URNS QL MICRO: ABNORMAL /LPF (ref 0–5)
IMM GRANULOCYTES # BLD AUTO: 0 K/UL (ref 0–0.04)
IMM GRANULOCYTES NFR BLD AUTO: 0 % (ref 0–0.5)
KETONES UR QL STRIP.AUTO: 15 MG/DL
LEUKOCYTE ESTERASE UR QL STRIP.AUTO: ABNORMAL
LYMPHOCYTES # BLD: 2.8 K/UL (ref 0.8–3.5)
LYMPHOCYTES NFR BLD: 34 % (ref 12–49)
MCH RBC QN AUTO: 30.4 PG (ref 26–34)
MCHC RBC AUTO-ENTMCNC: 34.3 G/DL (ref 30–36.5)
MCV RBC AUTO: 88.4 FL (ref 80–99)
METHADONE UR QL: NEGATIVE
MONOCYTES # BLD: 0.7 K/UL (ref 0–1)
MONOCYTES NFR BLD: 8 % (ref 5–13)
NEUTS SEG # BLD: 4.6 K/UL (ref 1.8–8)
NEUTS SEG NFR BLD: 57 % (ref 32–75)
NITRITE UR QL STRIP.AUTO: NEGATIVE
NRBC # BLD: 0 K/UL (ref 0–0.01)
NRBC BLD-RTO: 0 PER 100 WBC
OPIATES UR QL: NEGATIVE
PCP UR QL: NEGATIVE
PH UR STRIP: 5.5 (ref 5–8)
PLATELET # BLD AUTO: 214 K/UL (ref 150–400)
PMV BLD AUTO: 10.9 FL (ref 8.9–12.9)
POTASSIUM SERPL-SCNC: 4 MMOL/L (ref 3.5–5.1)
PROT SERPL-MCNC: 7.8 G/DL (ref 6.4–8.2)
PROT UR STRIP-MCNC: NEGATIVE MG/DL
RBC # BLD AUTO: 5.27 M/UL (ref 4.1–5.7)
RBC #/AREA URNS HPF: ABNORMAL /HPF (ref 0–5)
SALICYLATES SERPL-MCNC: <1.7 MG/DL (ref 2.8–20)
SAMPLES BEING HELD,HOLD: NORMAL
SARS-COV-2, COV2: NOT DETECTED
SODIUM SERPL-SCNC: 138 MMOL/L (ref 136–145)
SP GR UR REFRACTOMETRY: 1.01
UA: UC IF INDICATED,UAUC: ABNORMAL
UROBILINOGEN UR QL STRIP.AUTO: 0.2 EU/DL (ref 0.2–1)
WBC # BLD AUTO: 8.1 K/UL (ref 4.1–11.1)
WBC URNS QL MICRO: >100 /HPF (ref 0–4)

## 2023-01-09 PROCEDURE — 80179 DRUG ASSAY SALICYLATE: CPT

## 2023-01-09 PROCEDURE — 82077 ASSAY SPEC XCP UR&BREATH IA: CPT

## 2023-01-09 PROCEDURE — 65220000003 HC RM SEMIPRIVATE PSYCH

## 2023-01-09 PROCEDURE — 87636 SARSCOV2 & INF A&B AMP PRB: CPT

## 2023-01-09 PROCEDURE — 85025 COMPLETE CBC W/AUTO DIFF WBC: CPT

## 2023-01-09 PROCEDURE — 99285 EMERGENCY DEPT VISIT HI MDM: CPT

## 2023-01-09 PROCEDURE — 80053 COMPREHEN METABOLIC PANEL: CPT

## 2023-01-09 PROCEDURE — 87086 URINE CULTURE/COLONY COUNT: CPT

## 2023-01-09 PROCEDURE — 80307 DRUG TEST PRSMV CHEM ANLYZR: CPT

## 2023-01-09 PROCEDURE — 80143 DRUG ASSAY ACETAMINOPHEN: CPT

## 2023-01-09 PROCEDURE — 36415 COLL VENOUS BLD VENIPUNCTURE: CPT

## 2023-01-09 PROCEDURE — 81001 URINALYSIS AUTO W/SCOPE: CPT

## 2023-01-09 RX ORDER — OLANZAPINE 5 MG/1
5 TABLET ORAL
Status: DISCONTINUED | OUTPATIENT
Start: 2023-01-09 | End: 2023-01-12 | Stop reason: HOSPADM

## 2023-01-09 RX ORDER — ARIPIPRAZOLE 5 MG/1
5 TABLET ORAL DAILY
COMMUNITY

## 2023-01-09 RX ORDER — TRAZODONE HYDROCHLORIDE 50 MG/1
50 TABLET ORAL
Status: DISCONTINUED | OUTPATIENT
Start: 2023-01-09 | End: 2023-01-12 | Stop reason: HOSPADM

## 2023-01-09 RX ORDER — DM/P-EPHED/ACETAMINOPH/DOXYLAM 30-7.5/3
2 LIQUID (ML) ORAL
Status: DISCONTINUED | OUTPATIENT
Start: 2023-01-09 | End: 2023-01-10

## 2023-01-09 RX ORDER — ADHESIVE BANDAGE
30 BANDAGE TOPICAL DAILY PRN
Status: DISCONTINUED | OUTPATIENT
Start: 2023-01-09 | End: 2023-01-12 | Stop reason: HOSPADM

## 2023-01-09 RX ORDER — MIDAZOLAM HYDROCHLORIDE 1 MG/ML
2 INJECTION, SOLUTION INTRAMUSCULAR; INTRAVENOUS
Status: DISCONTINUED | OUTPATIENT
Start: 2023-01-09 | End: 2023-01-12 | Stop reason: HOSPADM

## 2023-01-09 RX ORDER — HALOPERIDOL 5 MG/ML
5 INJECTION INTRAMUSCULAR
Status: DISCONTINUED | OUTPATIENT
Start: 2023-01-09 | End: 2023-01-12 | Stop reason: HOSPADM

## 2023-01-09 RX ORDER — LAMOTRIGINE 200 MG/1
200 TABLET ORAL DAILY
COMMUNITY

## 2023-01-09 RX ORDER — CLONAZEPAM 1 MG/1
1 TABLET ORAL
COMMUNITY

## 2023-01-09 RX ORDER — ACETAMINOPHEN 325 MG/1
650 TABLET ORAL
Status: DISCONTINUED | OUTPATIENT
Start: 2023-01-09 | End: 2023-01-12 | Stop reason: HOSPADM

## 2023-01-09 RX ORDER — HYDROXYZINE 50 MG/1
50 TABLET, FILM COATED ORAL
Status: DISCONTINUED | OUTPATIENT
Start: 2023-01-09 | End: 2023-01-12 | Stop reason: HOSPADM

## 2023-01-09 RX ORDER — DIPHENHYDRAMINE HYDROCHLORIDE 50 MG/ML
50 INJECTION, SOLUTION INTRAMUSCULAR; INTRAVENOUS
Status: DISCONTINUED | OUTPATIENT
Start: 2023-01-09 | End: 2023-01-12 | Stop reason: HOSPADM

## 2023-01-09 RX ORDER — BENZTROPINE MESYLATE 1 MG/1
1 TABLET ORAL
Status: DISCONTINUED | OUTPATIENT
Start: 2023-01-09 | End: 2023-01-12 | Stop reason: HOSPADM

## 2023-01-09 NOTE — BSMART NOTE
Patient has been accepted to [facility] [place] [address] by CHERYL Damon NP on behalf of Dr Cedric Robles. Report can be called at [phone].
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
## 2023-01-09 NOTE — BH NOTES
TRANSFER - IN REPORT:    Verbal report received from [facility] RN on Mayte Standard  being received from [facility] ED for routine progression of care      Report consisted of patients Situation, Background, Assessment and   Recommendations(SBAR). Information from the following report(s) SBAR was reviewed with the receiving nurse. Opportunity for questions and clarification was provided. Assessment completed upon patients arrival to unit and care assumed.
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
## 2023-01-09 NOTE — CONSULTS
Psych consult completed.   Full consult note to follow    Assessment:'  Patient diagnosis bipolar disorder, recurrent major depressive disorder severe with suicide attempt    Recommend \"  Patient meets criteria for inpatient behavioral treatment  Restart home medications of Lamotrigine and Abilify

## 2023-01-09 NOTE — BSMART NOTE
BSMART assessment completed, and suicide risk level noted to be High Risk . Primary Nurse Jason Beach and Physician Rocio Sheffield notified. Concerns observed by Pt appearing irritable and indecisive. Pt verbalized being willing to move forward with [facility] admission voluntarily. If pt becoming unwilling, it is strongly recommended to contact [facility] for prescreening, due to Pt being a significant risk to harm self     Security/Off- has not been notified.

## 2023-01-09 NOTE — BSMART NOTE
BSMART Liaison Team Note     LOS:  10 hours     Patient goal(s) for today: take medication as prescribed, communicate needs to staff in an appropriate manner   BSMART Liaison team focus/goals: assess needs, provide education and support     Progress note: Patient received sleeping with his face down. Attempted to call his name a few times, but pt would not wake up. Pt then woke up but his eyes remained closed. Pt would fall in and out of sleep during assessment. Introduced self and role to patient. He denied current SI/HI. Denied [facility]. Pt states he feels \"alright. \" Pt did not have any questions or concerns and was not interested in therapy at this time. Pt remained sleeping. Barriers to Discharge: placement    Outpatient provider(s):  Corin Ibrahim M.D..  Insurance info/prescription coverage:  not on file    Diagnosis: bipolar disorder, recurrent major depressive disorder severe with suicide attempt    Plan:  patient has been accepted to [facility].    Follow up Psych Consult placed? no.   Psychiatrist updated? no       Participating treatment team members: Olga Lidia Gregg MSW, Supervisee in Social Work

## 2023-01-10 PROBLEM — F31.9 BIPOLAR 1 DISORDER (HCC): Status: ACTIVE | Noted: 2023-01-10

## 2023-01-10 LAB
BACTERIA SPEC CULT: NORMAL
SERVICE CMNT-IMP: NORMAL

## 2023-01-10 PROCEDURE — 74011250637 HC RX REV CODE- 250/637

## 2023-01-10 PROCEDURE — 74011250637 HC RX REV CODE- 250/637: Performed by: NURSE PRACTITIONER

## 2023-01-10 PROCEDURE — 65220000003 HC RM SEMIPRIVATE PSYCH

## 2023-01-10 RX ORDER — LAMOTRIGINE 100 MG/1
200 TABLET ORAL DAILY
Status: DISCONTINUED | OUTPATIENT
Start: 2023-01-10 | End: 2023-01-12 | Stop reason: HOSPADM

## 2023-01-10 RX ORDER — ARIPIPRAZOLE 5 MG/1
5 TABLET ORAL DAILY
Status: DISCONTINUED | OUTPATIENT
Start: 2023-01-10 | End: 2023-01-12 | Stop reason: HOSPADM

## 2023-01-10 RX ORDER — DM/P-EPHED/ACETAMINOPH/DOXYLAM 30-7.5/3
2 LIQUID (ML) ORAL
Status: DISCONTINUED | OUTPATIENT
Start: 2023-01-10 | End: 2023-01-12 | Stop reason: HOSPADM

## 2023-01-10 RX ADMIN — ARIPIPRAZOLE 5 MG: 5 TABLET ORAL at 12:32

## 2023-01-10 RX ADMIN — LAMOTRIGINE 200 MG: 100 TABLET ORAL at 12:32

## 2023-01-10 RX ADMIN — TRAZODONE HYDROCHLORIDE 50 MG: 50 TABLET ORAL at 20:19

## 2023-01-10 RX ADMIN — HYDROXYZINE HYDROCHLORIDE 50 MG: 50 TABLET ORAL at 20:19

## 2023-01-10 RX ADMIN — NICOTINE POLACRILEX 2 MG: 2 LOZENGE ORAL at 12:32

## 2023-01-10 NOTE — BH NOTES
Admission Note      Pt arrived with [facility] from [facility] ED on stretcher at 2030. Pt is voluntary. Pt is alert and oriented x4. Pt is calm and cooperative. Pt's vitals are stable. Pt's belongings have been searched and checked in. Pt denies SI/HI/A/VH. Primary Nurse Michael Damon RN and Amina Subramanian RN performed a dual skin assessment on this patient Impairment noted- see wound doc flow sheet - Abrasion on right forearm. Tristan score is 23.
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
## 2023-01-10 NOTE — PROGRESS NOTES
Laboratory Monitoring for Antipsychotics: This patient is currently prescribed the following medication(s):   Current Facility-Administered Medications   Medication Dose Route Frequency    lamoTRIgine (LaMICtal) tablet 200 mg  200 mg Oral DAILY    ARIPiprazole (ABILIFY) tablet 5 mg  5 mg Oral DAILY     The following labs have been completed for monitoring of antipsychotics and/or mood stabilizers:    Height, Weight, BMI Estimation  Estimated body mass index is 26.93 kg/m² as calculated from the following:    Height as of this encounter: 170.2 cm (67\"). Weight as of this encounter: 78 kg (171 lb 15.3 oz). Vital Signs/Blood Pressure  Visit Vitals  /74   Pulse 62   Temp 97.2 °F (36.2 °C)   Resp 16   Ht 170.2 cm (67\")   Wt 78 kg (171 lb 15.3 oz)   SpO2 95%   BMI 26.93 kg/m²     Renal Function, Hepatic Function and Chemistry  Estimated Creatinine Clearance: 80.6 mL/min (based on SCr of 1.23 mg/dL). Lab Results   Component Value Date/Time    Sodium 138 01/09/2023 04:44 AM    Potassium 4.0 01/09/2023 04:44 AM    Chloride 104 01/09/2023 04:44 AM    CO2 23 01/09/2023 04:44 AM    Anion gap 11 01/09/2023 04:44 AM    BUN 20 01/09/2023 04:44 AM    Creatinine 1.23 01/09/2023 04:44 AM    BUN/Creatinine ratio 16 01/09/2023 04:44 AM    Bilirubin, total 1.7 (H) 01/09/2023 04:44 AM    Protein, total 7.8 01/09/2023 04:44 AM    Albumin 4.7 01/09/2023 04:44 AM    Globulin 3.1 01/09/2023 04:44 AM    A-G Ratio 1.5 01/09/2023 04:44 AM    ALT (SGPT) 31 01/09/2023 04:44 AM    AST (SGOT) 37 01/09/2023 04:44 AM    Alk.  phosphatase 74 01/09/2023 04:44 AM     Lab Results   Component Value Date/Time    Glucose 67 01/09/2023 04:44 AM     No results found for: HBA1C, [id number]    Hematology  Lab Results   Component Value Date/Time    WBC 8.1 01/09/2023 04:44 AM    RBC 5.27 01/09/2023 04:44 AM    HGB 16.0 01/09/2023 04:44 AM    HCT 46.6 01/09/2023 04:44 AM    MCV 88.4 01/09/2023 04:44 AM    MCH 30.4 01/09/2023 04:44 AM    MCHC 34.3 01/09/2023 04:44 AM    RDW 12.1 01/09/2023 04:44 AM    PLATELET 159 50/83/3896 04:44 AM     Lipids  No results found for: CHOL, CHOLX, CHLST, CHOLV, 642458, HDL, HDLP, LDL, LDLC, DLDLP, TGLX, TRIGL, TRIGP, CHHD, CHHDX    Thyroid Function  No results found for: TSH, TSH2, TSH3, TSHP, TSHELE, TT3, T3U, T3UP, FRT3, FT3, FT4, FT4P, T4, T4P, FT4T, TT7, TSHEXT    Assessment/Plan:  Will order lipid panel and hemoglobin A1c or fasting glucose to complete the recommended baseline laboratory monitoring based on the patient's current medication regimen.         ARLYN GrahamD

## 2023-01-10 NOTE — PROGRESS NOTES
Patient resting in bed with eyes closed. No complaints or distress noted. Safety measures in place. Will continue to monitor q15 minutes. Problem: Falls - Risk of  Goal: *Absence of Falls  Description: Document [place] Fall Risk and appropriate interventions in the flowsheet.   Outcome: Progressing Towards Goal  Note: Fall Risk Interventions:

## 2023-01-10 NOTE — PROGRESS NOTES
Admission Medication Reconciliation:    Information obtained from:  patient interview, Insurance claims data, and [facility]  RxQuery data available¹:  YES    Comments/Recommendations: Updated PTA meds/reviewed patient's allergies. 1)  The patient confirms his medications during treatment team. He reports that his lamotrigine dose 200 mg daily (however, the last prescription was for 25 mg tablets but patient reports that he did not get that filled). He reports that he last took lamotrigine two days ago. 2)  The [address] [place] [address] Prescription Monitoring Program () was assessed to determine fill history of any controlled medications. The patient has filled the following controlled medications in the last 12 years. - 12/28/22: Vyvanse 70 mg, #30 for 30 day supply    Of note, insurance data shows that he also filled Vyvanse and clonazepam on 12/2/22 in [place]. 3)  Medication changes (since last review): Adjusted  - lisdexamfetamine 70 mg daily (from 75 mg)  - clonazepam 1 mg BID PRN (from scheduled)   [address] [facility] benefit data reflects medications filled and processed through the patient's insurance, however this data does NOT capture whether the medication was picked up or is currently being taken by the patient. Allergies:  Patient has no known allergies. Significant PMH/Disease States:   Past Medical History:   Diagnosis Date    Psychiatric disorder      Chief Complaint for this Admission:  No chief complaint on file. Prior to Admission Medications:   Prior to Admission Medications   Prescriptions Last Dose Informant Taking? ARIPiprazole (ABILIFY) 5 mg tablet   Yes   Sig: Take 5 mg by mouth daily. Lisdexamfetamine (VYVANSE) 70 mg cap   Yes   Sig: Take 70 mg by mouth daily. clonazePAM (KlonoPIN) 1 mg tablet   Yes   Sig: Take 1 mg by mouth two (2) times daily as needed for Anxiety. lamoTRIgine (LaMICtal) 200 mg tablet   Yes   Sig: Take 200 mg by mouth daily. Facility-Administered Medications: None     Gina Price, ARLYND
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
## 2023-01-10 NOTE — ED NOTES
TRANSFER - IN REPORT:    Verbal report received from [address] [facility] [address] [facility] RN(name) on Frank Winter. Report consisted of patients Situation, Background, Assessment and   Recommendations(SBAR). Information from the following report(s) SBAR and ED Summary was reviewed with the receiving nurse. Opportunity for questions and clarification was provided. Pt resting in stretcher at this time, no needs expressed. Safety sitter at bedside.

## 2023-01-10 NOTE — PROGRESS NOTES
Problem: Depressed Mood (Adult/Pediatric)  Goal: *STG: Participates in treatment plan  Outcome: Progressing Towards Goal  Note: In bed resting. Declined to attend groups or engage with peers. Sad affect, mood depressed noted easily irritable when discussing marriage and his chronic level of stress. Frequently denies SI, and offers multiple reasons to live such as his child and work. Review medications and PTA meds. Staff focus is on offering support and reassurance.    Goal: *STG: Demonstrates reduction in symptoms and increase in insight into coping skills/future focused  Outcome: Progressing Towards Goal  Goal: Interventions  Outcome: Bandar Alvarez  Master Treatment Plan for Juan Lombardo    Date Treatment Plan Initiated: 1/10/23    Treatment Plan Modalities:  Type of Modality Amount  (x minutes) Frequency (x/week) Duration (x days) Name of Responsible Staff   [facility] meetings to encourage peer interactions 15 7 1 Abhay Ruth     Group psychotherapy to assist in building coping skills and internal controls 60 7 1 Hema Israel MS   Therapeutic activity groups to build coping skills 60 7 1 Hema Israel [place]   Psychoeducation in group setting to address:   Medication education   [facility] skills   [address] [facility] [address]   Relaxation techniques         Symptom management         Discharge planning   60 2 [facility]   Spirituality    61 2 1 Chaplain ESTEBAN   61 1 1 volunteer   Recovery/AA/NA      volunteer   Physician medication management   [facility]   Family meeting/discharge planning   15 2 [facility]

## 2023-01-10 NOTE — ED NOTES
[facility] at bedside to transport pt to [facility]. Pt belongings that are being transported include pts cell phone, wallet, small backpack, and clothing including pants and a shirt.
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
## 2023-01-10 NOTE — ED NOTES
Report called to [address] at [facility] for admission to psychiatry facility.

## 2023-01-11 LAB
CHOLEST SERPL-MCNC: 164 MG/DL
EST. AVERAGE GLUCOSE BLD GHB EST-MCNC: 103 MG/DL
HBA1C MFR BLD: 5.2 % (ref 4–5.6)
HDLC SERPL-MCNC: 38 MG/DL
HDLC SERPL: 4.3 (ref 0–5)
LDLC SERPL CALC-MCNC: 101.4 MG/DL (ref 0–100)
TRIGL SERPL-MCNC: 123 MG/DL (ref ?–150)
VLDLC SERPL CALC-MCNC: 24.6 MG/DL

## 2023-01-11 PROCEDURE — 80061 LIPID PANEL: CPT

## 2023-01-11 PROCEDURE — 83036 HEMOGLOBIN GLYCOSYLATED A1C: CPT

## 2023-01-11 PROCEDURE — 74011250637 HC RX REV CODE- 250/637: Performed by: NURSE PRACTITIONER

## 2023-01-11 PROCEDURE — 36415 COLL VENOUS BLD VENIPUNCTURE: CPT

## 2023-01-11 PROCEDURE — 65220000003 HC RM SEMIPRIVATE PSYCH

## 2023-01-11 PROCEDURE — 74011250637 HC RX REV CODE- 250/637

## 2023-01-11 RX ADMIN — NICOTINE POLACRILEX 2 MG: 2 LOZENGE ORAL at 16:11

## 2023-01-11 RX ADMIN — NICOTINE POLACRILEX 2 MG: 2 LOZENGE ORAL at 12:12

## 2023-01-11 RX ADMIN — ARIPIPRAZOLE 5 MG: 5 TABLET ORAL at 09:16

## 2023-01-11 RX ADMIN — NICOTINE POLACRILEX 2 MG: 2 LOZENGE ORAL at 09:16

## 2023-01-11 RX ADMIN — TRAZODONE HYDROCHLORIDE 50 MG: 50 TABLET ORAL at 21:09

## 2023-01-11 RX ADMIN — LAMOTRIGINE 200 MG: 100 TABLET ORAL at 09:16

## 2023-01-11 NOTE — INTERDISCIPLINARY ROUNDS
Behavioral Health Interdisciplinary Rounds     Patient Name: Desirae Peterson  Age: 28 y.o. Room/Bed:  727/02  Primary Diagnosis: <principal problem not specified>   Admission Status: Voluntary     Readmission within 30 days: no  Power of  in place: no  Patient requires a blocked bed: no          Reason for blocked bed:       Flu Vaccine :    Consults: None         Labs/Testing due today? Have they refused labs?: Yes, no    Sleep hours: 7      Participation in Care/Groups:  yes  Medication Compliant?: Yes  PRNS (last 24 hours): Antianxiety and Sleep Aid    Restraints (last 24 hours):  no     CIWA (range last 24 hours):     COWS (range last 24 hours):      Alcohol screening (AUDIT) completed -         If applicable, date SBIRT discussed in treatment team AND documented:   AUDIT Screen Score:        Document Brief Intervention (corresponds directly with the 5 A's, Ask, Advise, Assess, Assist, and Arrange): At- Risk Patients (Score 7-15 for women; 8-15 for men)  Discuss concern patient is drinking at unhealthy levels known to increase risk of alcohol-related health problems. Is Patient ready to commit to change? If No:  Encourage reflection  Discuss short term and long term health risks of consuming alcohol  Barriers to change  Reaffirm willingness to help / Educational materials provided  If Yes:  Set goal  Plan  Educational materials provided    Harmful use or Dependence (Score 16 or greater)  Discuss short term and long term health risks of consuming alcohol  Recommendations  Negotiate drinking goal  Recommend addiction specialist/center  Arrange follow-up appointments.     Tobacco - patient is a smoker: Have You Used Tobacco in the Past 30 Days: Yes  Illegal Drugs use: Have You Used Any Illegal Substances Over the Past 12 Months: Yes    24 hour chart check complete: yes   ____________________________________________________________________________________________________________    Patient goal(s) for today:   Treatment team focus/goals:   Progress note     LOS:  2  Expected LOS:     Financial concerns/prescription coverage:    Family contact:       Family requesting physician contact today:    Discharge plan:   Access to weapons :         Outpatient provider(s):   Patient's preferred phone number for follow up call :   Patient's preferred e-mail address :  Participating treatment team members: Mayte Brumfield, * (assigned SW),

## 2023-01-11 NOTE — PROGRESS NOTES
Laboratory Monitoring for Antipsychotics: This patient is currently prescribed the following medication(s):   Current Facility-Administered Medications   Medication Dose Route Frequency    lamoTRIgine (LaMICtal) tablet 200 mg  200 mg Oral DAILY    ARIPiprazole (ABILIFY) tablet 5 mg  5 mg Oral DAILY     The following labs have been completed for monitoring of antipsychotics and/or mood stabilizers:    Height, Weight, BMI Estimation  Estimated body mass index is 26.93 kg/m² as calculated from the following:    Height as of this encounter: 170.2 cm (67\"). Weight as of this encounter: 78 kg (171 lb 15.3 oz). Vital Signs/Blood Pressure  Visit Vitals  /72   Pulse 83   Temp 98 °F (36.7 °C)   Resp 16   Ht 170.2 cm (67\")   Wt 78 kg (171 lb 15.3 oz)   SpO2 97%   BMI 26.93 kg/m²     Renal Function, Hepatic Function and Chemistry  Estimated Creatinine Clearance: 80.6 mL/min (by C-G formula based on SCr of 1.23 mg/dL). Lab Results   Component Value Date/Time    Sodium 138 01/09/2023 04:44 AM    Potassium 4.0 01/09/2023 04:44 AM    Chloride 104 01/09/2023 04:44 AM    CO2 23 01/09/2023 04:44 AM    Anion gap 11 01/09/2023 04:44 AM    BUN 20 01/09/2023 04:44 AM    Creatinine 1.23 01/09/2023 04:44 AM    BUN/Creatinine ratio 16 01/09/2023 04:44 AM    Bilirubin, total 1.7 (H) 01/09/2023 04:44 AM    Protein, total 7.8 01/09/2023 04:44 AM    Albumin 4.7 01/09/2023 04:44 AM    Globulin 3.1 01/09/2023 04:44 AM    A-G Ratio 1.5 01/09/2023 04:44 AM    ALT (SGPT) 31 01/09/2023 04:44 AM    AST (SGOT) 37 01/09/2023 04:44 AM    Alk.  phosphatase 74 01/09/2023 04:44 AM     Lab Results   Component Value Date/Time    Glucose 67 01/09/2023 04:44 AM     Lab Results   Component Value Date/Time    Hemoglobin A1c 5.2 01/11/2023 06:10 AM     Hematology  Lab Results   Component Value Date/Time    WBC 8.1 01/09/2023 04:44 AM    RBC 5.27 01/09/2023 04:44 AM    HGB 16.0 01/09/2023 04:44 AM    HCT 46.6 01/09/2023 04:44 AM    MCV 88.4 01/09/2023 04:44 AM    MCH 30.4 01/09/2023 04:44 AM    MCHC 34.3 01/09/2023 04:44 AM    RDW 12.1 01/09/2023 04:44 AM    PLATELET 963 29/46/2183 04:44 AM     Lipids  Lab Results   Component Value Date/Time    Cholesterol, total 164 01/11/2023 06:10 AM    HDL Cholesterol 38 01/11/2023 06:10 AM    LDL, calculated 101.4 (H) 01/11/2023 06:10 AM    Triglyceride 123 01/11/2023 06:10 AM    CHOL/HDL Ratio 4.3 01/11/2023 06:10 AM     Thyroid Function  No results found for: TSH, TSH2, TSH3, TSHP, TSHELE, TT3, T3U, T3UP, FRT3, FT3, FT4, FT4P, T4, T4P, FT4T, TT7, TSHEXT    Assessment/Plan:  Recommended baseline laboratory monitoring has been completed based on this patient's current medication regimen. No further interventions are needed at this time. Follow-up metabolic monitoring labs should be completed in 3 months (quarterly for the first year of antipsychotic therapy).      Carl Bender, PHARMD

## 2023-01-11 NOTE — BH NOTES
PSYCHIATRIC PROGRESS NOTE    Patient: Cruz Juarez MRN: [id number]  SSN: [id number]    YOB: 1990  Age: 28 y.o. Sex: male      Admit Date: 1/9/2023    Length of Stay: 2 Days    Chief Complaint: \"I feel great. \"     Interval History:   1/11/23-  Clarke Marshall states he is doing well today. He denies SI/plan/intent, and states he has not had any thoughts of suicide since he has arrived here. He denies HI/plan/intent and AVH. He slept well last night. He is sleepy as he has not been getting his Vyvanse, but otherwise denies physical concerns. No evidence of any psychotic processes observed. Appetite is good. Cruz Juarez has been compliant with medications and finds them beneficial. Denies adverse effects. Denies other changes or new concerns or questions at this time. Past Medical History:  Past Medical History:   Diagnosis Date    Psychiatric disorder        Labs:  Lab Results   Component Value Date/Time    WBC 8.1 01/09/2023 04:44 AM    HGB 16.0 01/09/2023 04:44 AM    HCT 46.6 01/09/2023 04:44 AM    PLATELET 043 55/95/7956 04:44 AM    MCV 88.4 01/09/2023 04:44 AM      Lab Results   Component Value Date/Time    Sodium 138 01/09/2023 04:44 AM    Potassium 4.0 01/09/2023 04:44 AM    Chloride 104 01/09/2023 04:44 AM    CO2 23 01/09/2023 04:44 AM    Anion gap 11 01/09/2023 04:44 AM    Glucose 67 01/09/2023 04:44 AM    BUN 20 01/09/2023 04:44 AM    Creatinine 1.23 01/09/2023 04:44 AM    BUN/Creatinine ratio 16 01/09/2023 04:44 AM    Calcium 9.2 01/09/2023 04:44 AM    Bilirubin, total 1.7 (H) 01/09/2023 04:44 AM    Alk.  phosphatase 74 01/09/2023 04:44 AM    Protein, total 7.8 01/09/2023 04:44 AM    Albumin 4.7 01/09/2023 04:44 AM    Globulin 3.1 01/09/2023 04:44 AM    A-G Ratio 1.5 01/09/2023 04:44 AM    ALT (SGPT) 31 01/09/2023 04:44 AM      Vitals:    01/10/23 0720 01/10/23 0748 01/10/23 1629 01/11/23 0822   BP: 111/74  115/80 118/72   Pulse: 62  64 83   Resp: 16  16 16   Temp: 97.2 °F (36.2 °C)  97.9 °F (36.6 °C) 98 °F (36.7 °C)   SpO2: 95%  98% 97%   Weight:  78 kg (171 lb 15.3 oz)     Height:  5' 7\" (1.702 m)         Current Facility-Administered Medications   Medication Dose Route Frequency Provider Last Rate Last Admin    lamoTRIgine (LaMICtal) tablet 200 mg  200 mg Oral DAILY Nadia Renaldo, NP   200 mg at 01/11/23 0916    ARIPiprazole (ABILIFY) tablet 5 mg  5 mg Oral DAILY Nadia Renaldo, NP   5 mg at 01/11/23 0259    nicotine buccal (POLACRILEX) lozenge 2 mg  2 mg Oral Q2H PRN Nadia Renaldo, NP   2 mg at 01/11/23 0916    OLANZapine (ZyPREXA) tablet 5 mg  5 mg Oral Q6H PRN Cain South, NP        haloperidol lactate (HALDOL) injection 5 mg  5 mg IntraMUSCular Q6H PRN Barlow South, NP        benztropine (COGENTIN) tablet 1 mg  1 mg Oral BID PRN Barlow South, NP        diphenhydrAMINE (BENADRYL) injection 50 mg  50 mg IntraMUSCular BID PRN Barlow South, NP        hydrOXYzine HCL (ATARAX) tablet 50 mg  50 mg Oral TID PRN Barlow South, NP   50 mg at 01/10/23 2019    traZODone (DESYREL) tablet 50 mg  50 mg Oral QHS PRN Barlow South, NP   50 mg at 01/10/23 2019    acetaminophen (TYLENOL) tablet 650 mg  650 mg Oral Q4H PRN Cain South, NP        magnesium hydroxide (MILK OF MAGNESIA) 400 mg/5 mL oral suspension 30 mL  30 mL Oral DAILY PRN Cain South, NP        midazolam (VERSED) injection 2 mg  2 mg IntraMUSCular Q6H PRN Barlow South, NP           Mental Status Exam:  Liz Ledesma is a 28 y.o. male who is moderately groomed, appears stated age, dressed in casual clothes. Eye contact is fair.    Psychomotor activity is WNL  Speech is spontaneous, normal rate, volume, rhythm  Mood is \"OK\"  Affect: mood-congruent, full range  Perception: no AVH, no evidence of psychotic processes  Thought process: Linear, logical, organized  Thought content: Denies SI/plan/intent, denies HI/plan/intent  Insight/judgment: intact  Cognition is grossly intact    Physical Exam:  Body habitus: Body mass index is 26.93 kg/m².  Musculoskeletal system: normal gait  Tremor - neg  Cog wheeling - neg    Assessment and Plan:  Chucky Cutler meets criteria for a diagnosis of:  Bipolar II, current episode mixed; ADHD, by hx; hx of cocaine use disorder. Continue the medication regimen as prescribed  Disposition planning to continue. A coordinated, multidisplinary treatment team round was conducted with the patient, nurses, pharmcist,  and writer present. Discussions held with , and/or with family members; Complete current electronic health record for patient was reviewed in full including consultant notes, ancillary staff notes, nurses and tech notes, labs and vitals. I certify that this patients inpatient psychiatric hospital services furnished since the previous certification were, and continue to be, required for treatment that could reasonably be expected to improve the patient's condition, or for diagnostic study, and that the patient continues to need, on a daily basis, active treatment furnished directly by or requiring the supervision of inpatient psychiatric facility personnel. In addition, the hospital records show that services furnished were intensive treatment services, admission or related services, or equivalent services.

## 2023-01-11 NOTE — PROGRESS NOTES
Problem: Depressed Mood (Adult/Pediatric)  Goal: *STG: Participates in treatment plan  Outcome: Progressing Towards Goal     Problem: Falls - Risk of  Goal: *Absence of Falls  Description: Document Rusty Fall Risk and appropriate interventions in the flowsheet.   Outcome: Progressing Towards Goal  Note: Fall Risk Interventions:

## 2023-01-11 NOTE — PROGRESS NOTES
Problem: Depressed Mood (Adult/Pediatric)  Goal: *STG: Participates in treatment plan  Outcome: Progressing Towards Goal     Problem: Patient Education: Go to Patient Education Activity  Goal: Patient/Family Education  Outcome: Progressing Towards Goal     Problem: Falls - Risk of  Goal: *Absence of Falls  Description: Document Chelly Don Fall Risk and appropriate interventions in the flowsheet. Outcome: Progressing Towards Goal    Patient resting/sleeping in room. No complaints or safety issues noted at this time. Will continue to monitor with Q15 minute observation.

## 2023-01-12 VITALS
WEIGHT: 171.96 LBS | BODY MASS INDEX: 26.99 KG/M2 | SYSTOLIC BLOOD PRESSURE: 114 MMHG | OXYGEN SATURATION: 98 % | TEMPERATURE: 98.4 F | HEIGHT: 67 IN | RESPIRATION RATE: 16 BRPM | HEART RATE: 78 BPM | DIASTOLIC BLOOD PRESSURE: 73 MMHG

## 2023-01-12 PROCEDURE — 74011250637 HC RX REV CODE- 250/637: Performed by: NURSE PRACTITIONER

## 2023-01-12 RX ADMIN — NICOTINE POLACRILEX 2 MG: 2 LOZENGE ORAL at 08:15

## 2023-01-12 RX ADMIN — NICOTINE POLACRILEX 2 MG: 2 LOZENGE ORAL at 11:31

## 2023-01-12 RX ADMIN — LAMOTRIGINE 200 MG: 100 TABLET ORAL at 08:15

## 2023-01-12 RX ADMIN — ARIPIPRAZOLE 5 MG: 5 TABLET ORAL at 08:15

## 2023-01-12 NOTE — PROGRESS NOTES
Problem: Depressed Mood (Adult/Pediatric)  Goal: *STG: Participates in treatment plan  Outcome: Progressing Towards Goal  Note: Out on unit passively engaged. Mood and affect stable, demonstrates ability to follow nursing direction and compliant with medications.  Actively working on d/c planning  Goal: *STG: Demonstrates reduction in symptoms and increase in insight into coping skills/future focused  Outcome: Progressing Towards Goal  Goal: Interventions  Outcome: Progressing Towards Goal

## 2023-01-12 NOTE — DISCHARGE INSTRUCTIONS
DISCHARGE SUMMARY    NAME:Frank Lui  : 1990  MRN: [id number]    The patient Nara Eden exhibits the ability to control behavior in a less restrictive environment. Patient's level of functioning is improving. No assaultive/destructive behavior has been observed for the past 24 hours. No suicidal/homicidal threat or behavior has been observed for the past 24 hours. There is no evidence of serious medication side effects. Patient has not been in physical or protective restraints for at least the past 24 hours. If weapons involved, how are they secured? None    Is patient aware of and in agreement with discharge plan? Yes    Arrangements for medication:  Paper prescriptions provided to take to preferred community pharmacy    Copy of discharge instructions to provider?: Yes    Arrangements for transportation home: Family    Keep all follow up appointments as scheduled, continue to take prescribed medications per physician instructions. Mental health crisis number:  157 or your local mental health crisis line number at [phone]      [place] [address] Emergency WARM LINE      [phone] ([phone])      M-F: 9am to 9pm      Sat & Sun: [facility] suicide prevention lines:                             [phone] ([phone])       [phone] ([phone])    Crisis Text Line:  Text HOME to [address] [place] [address] Street from Nurse      PATIENT INSTRUCTIONS:    What to do at Home:  Recommended activity: Activity as tolerated,     *  Please give a list of your current medications to your Primary Care Provider. *  Please update this list whenever your medications are discontinued, doses are      changed, or new medications (including over-the-counter products) are added. *  Please carry medication information at all times in case of emergency situations.     These are general instructions for a healthy lifestyle:    No smoking/ No tobacco products/ Avoid exposure to second hand smoke  Surgeon General's Warning:  Quitting smoking now greatly reduces serious risk to your health. Obesity, smoking, and sedentary lifestyle greatly increases your risk for illness    A healthy diet, regular physical exercise & weight monitoring are important for maintaining a healthy lifestyle    You may be retaining fluid if you have a history of heart failure or if you experience any of the following symptoms:  Weight gain of 3 pounds or more overnight or 5 pounds in a week, increased swelling in our hands or feet or shortness of breath while lying flat in bed. Please call your doctor as soon as you notice any of these symptoms; do not wait until your next office visit. The discharge information has been reviewed with the patient. The patient verbalized understanding. Discharge medications reviewed with the patient and appropriate educational materials and side effects teaching were provided.   ___________________________________________________________________________________________________________________________________

## 2023-01-12 NOTE — PROGRESS NOTES
Problem: Depressed Mood (Adult/Pediatric)  Goal: *STG: Participates in treatment plan  Outcome: Progressing Towards Goal     Problem: Depressed Mood (Adult/Pediatric)  Goal: *STG: Demonstrates reduction in symptoms and increase in insight into coping skills/future focused  Outcome: Progressing Towards Goal     Problem: Patient Education: Go to Patient Education Activity  Goal: Patient/Family Education  Outcome: Progressing Towards Goal     Patient has been visible on the unit and social with peers. Interacts appropriately with both peers and staff. Denies SI/HI. Will continue to monitor for safety with Q15 minute observation.

## 2023-01-12 NOTE — PROGRESS NOTES
Problem: Falls - Risk of  Goal: *Absence of Falls  Description: Document Aneudy Blight Fall Risk and appropriate interventions in the flowsheet. Outcome: Progressing Towards Goal  Note: Fall Risk Interventions:     Patient is resting quietly in bed with eyes closed. Appears to be asleep. No acute or respiratory distress noted. Will continue to monitor q15 min rounds.
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
## 2023-01-12 NOTE — INTERDISCIPLINARY ROUNDS
Behavioral Health Interdisciplinary Rounds     Patient Name: Armond Goldmann  Age: 28 y.o. Room/Bed:  727/02  Primary Diagnosis: <principal problem not specified>   Admission Status: Voluntary     Readmission within 30 days: no  Power of  in place: no  Patient requires a blocked bed: no          Reason for blocked bed:   Sleep hours:  7+      Participation in Care/Groups:    Medication Compliant?: Yes  PRNS (last 24 hours): Sleep Aid    Restraints (last 24 hours):  no     Alcohol screening (AUDIT) completed -     If applicable, date SBIRT discussed in treatment team AND documented:    Tobacco - patient is a smoker: Have You Used Tobacco in the Past 30 Days: Yes  Illegal Drugs use: Have You Used Any Illegal Substances Over the Past 12 Months: Yes    24 hour chart check complete: yes     _______________________________________________    Patient goal(s) for today:   Treatment team focus/goals:   Progress note: Pt met with treatment team and appeared with a calm and pleasant mood and affect. Pt was able to demonstrate future oriented thinking stating reasons to live for being his daughter, his work and feels that his 6 prior suicide attempts have failed which he interprets as he is meant to live for more in this life. Pt reports he and his wife are expected to start a lot of therapy programs and she will be starting a DBT group which he feels will help their relationship. Pt plan to buy an RV soon to create a space for him to step away if conflict arises. Pt denies SI/HI and [facility], denies access to weapons. SW placed follow up in discharge paperwork, plan for wife to  around 1300, paper prescriptions provided.         Financial concerns/prescription coverage:    Family contact:                        Family requesting physician contact today:    Discharge plan:   Access to weapons :                                                              Outpatient provider(s):   Patient's preferred phone number for follow up call :   Patient's preferred e-mail address :  Participating treatment team members:    LOS:  3  Expected LOS: 3      Participating treatment team members: Mayte Brumfield, AMANDA Scott, Salena Hale RN, Ashly Erci NP, Neftali Taylor, ShaniquaD

## 2023-01-12 NOTE — PROGRESS NOTES
Pharmacist Discharge Medication Reconciliation    Discharging Provider: Alyx Fowler NP    Significant PMH:   Past Medical History:   Diagnosis Date    Psychiatric disorder      Chief Complaint for this Admission: No chief complaint on file. Allergies: Patient has no known allergies. Discharge Medications:   Current Discharge Medication List        CONTINUE these medications which have NOT CHANGED    Details   lamoTRIgine (LaMICtal) 200 mg tablet Take 200 mg by mouth daily. ARIPiprazole (ABILIFY) 5 mg tablet Take 5 mg by mouth daily. Lisdexamfetamine (VYVANSE) 70 mg cap Take 70 mg by mouth daily. clonazePAM (KlonoPIN) 1 mg tablet Take 1 mg by mouth two (2) times daily as needed for Anxiety.            The patient's chart, MAR and AVS were reviewed by Allen Martinez PHARMD.

## 2023-01-12 NOTE — BH NOTES
PSYCHIATRIC PROGRESS NOTE    Patient: Taiwo Brizuela MRN: [id number]  SSN: [id number]    YOB: 1990  Age: 28 y.o. Sex: male      Admit Date: 1/9/2023    Length of Stay: 3 Days    Chief Complaint: \"I feel great. \"     Interval History:   1/12/23-  Barak Perdomo is doing well today. He feels ready to discharge. He is calm, cooperative, and appropriate. He denies SI/plan/intent, HI/plan/intent, and AVH. He is future oriented and is able to identify strategies he will engage in to prevent a situation where he wants to harm himself. He has a plan to purchase an RV so he can take space from his wife when interpersonal situations become stressful. He identified several protective factors, such as his 1year old daughter, Brandi Rizo, who he loves very much and wants to see grow up, and was able to verbalize insight into the impact his suicide would have on her. He also verbalizes passion for his work and goals for the future, including projects at work at the [employer]. He has other plans and hopes for the future including going back to [place]. Further, he states that after making 6 suicide attempts and not dying, he states \"I feel like the universe is trying to tell me something, that I should still be here. \" He is actively in therapy, both individual and couples, and is also interested in engaging in MDMA assisted couples therapy if that is an option. He does voice hope for the future and optimism. 1/11/23Chace Starks states he is doing well today. He denies SI/plan/intent, and states he has not had any thoughts of suicide since he has arrived here. He denies HI/plan/intent and AVH. He slept well last night. He is sleepy as he has not been getting his Vyvanse, but otherwise denies physical concerns. No evidence of any psychotic processes observed. Appetite is good. Taiwo Brizuela has been compliant with medications and finds them beneficial. Denies adverse effects.  Denies other changes or new concerns or questions at this time.     Past Medical History:  Past Medical History:   Diagnosis Date    Psychiatric disorder        Labs:  Lab Results   Component Value Date/Time    WBC 8.1 01/09/2023 04:44 AM    HGB 16.0 01/09/2023 04:44 AM    HCT 46.6 01/09/2023 04:44 AM    PLATELET 597 14/09/0325 04:44 AM    MCV 88.4 01/09/2023 04:44 AM      Lab Results   Component Value Date/Time    Sodium 138 01/09/2023 04:44 AM    Potassium 4.0 01/09/2023 04:44 AM    Chloride 104 01/09/2023 04:44 AM    CO2 23 01/09/2023 04:44 AM    Anion gap 11 01/09/2023 04:44 AM    Glucose 67 01/09/2023 04:44 AM    BUN 20 01/09/2023 04:44 AM    Creatinine 1.23 01/09/2023 04:44 AM    BUN/Creatinine ratio 16 01/09/2023 04:44 AM    Calcium 9.2 01/09/2023 04:44 AM    Bilirubin, total 1.7 (H) 01/09/2023 04:44 AM    Alk.  phosphatase 74 01/09/2023 04:44 AM    Protein, total 7.8 01/09/2023 04:44 AM    Albumin 4.7 01/09/2023 04:44 AM    Globulin 3.1 01/09/2023 04:44 AM    A-G Ratio 1.5 01/09/2023 04:44 AM    ALT (SGPT) 31 01/09/2023 04:44 AM      Vitals:    01/11/23 0822 01/11/23 1146 01/11/23 1623 01/12/23 0951   BP: 118/72 106/68 111/77 (!) 141/95   Pulse: 83 97 85 84   Resp: 16 16 16 16   Temp: 98 °F (36.7 °C) 98.1 °F (36.7 °C) 98.6 °F (37 °C) 97.9 °F (36.6 °C)   SpO2: 97% 97% 97% 97%   Weight:       Height:           Current Facility-Administered Medications   Medication Dose Route Frequency Provider Last Rate Last Admin    lamoTRIgine (LaMICtal) tablet 200 mg  200 mg Oral DAILY Shailesh Bauer NP   200 mg at 01/12/23 0815    ARIPiprazole (ABILIFY) tablet 5 mg  5 mg Oral DAILY Shailesh Bauer NP   5 mg at 01/12/23 8053    nicotine buccal (POLACRILEX) lozenge 2 mg  2 mg Oral Q2H PRN Letted Bauer NP   2 mg at 01/12/23 0815    OLANZapine (ZyPREXA) tablet 5 mg  5 mg Oral Q6H PRN Ventura Neat, NP        haloperidol lactate (HALDOL) injection 5 mg  5 mg IntraMUSCular Q6H PRN Ventura Neat, NP        benztropine (COGENTIN) tablet 1 mg  1 mg Oral BID PRN Ventura Neat, NP diphenhydrAMINE (BENADRYL) injection 50 mg  50 mg IntraMUSCular BID PRN Cassius Jc, NP        hydrOXYzine HCL (ATARAX) tablet 50 mg  50 mg Oral TID PRN Cassius Babita, NP   50 mg at 01/10/23 2019    traZODone (DESYREL) tablet 50 mg  50 mg Oral QHS PRN Cassius Pick, NP   50 mg at 01/11/23 2109    acetaminophen (TYLENOL) tablet 650 mg  650 mg Oral Q4H PRN Cassius Jc, NP        magnesium hydroxide (MILK OF MAGNESIA) 400 mg/5 mL oral suspension 30 mL  30 mL Oral DAILY PRN Cassius Jc, NP        midazolam (VERSED) injection 2 mg  2 mg IntraMUSCular Q6H PRN Cassius Jc, NP           Mental Status Exam:  Casey Wellington is a 28 y.o. male who is moderately groomed, appears stated age, dressed in casual clothes. Eye contact is fair. Psychomotor activity is WNL  Speech is spontaneous, normal rate, volume, rhythm  Mood is \"OK\"  Affect: mood-congruent, full range  Perception: no AVH, no evidence of psychotic processes  Thought process: Linear, logical, organized  Thought content: Denies SI/plan/intent, denies HI/plan/intent  Insight/judgment: intact  Cognition is grossly intact    Physical Exam:  Body habitus: Body mass index is 26.93 kg/m². Musculoskeletal system: normal gait  Tremor - neg  Cog wheeling - neg    Assessment and Plan:  Casey Wellington meets criteria for a diagnosis of:  Bipolar II, current episode mixed; ADHD, by hx; hx of cocaine use disorder. 1/12/23- Continue the medication regimen as prescribed. Discharge today to home. Follow up with outpatient psychiatrist 1/17/23. Pt engaged in safety planning, is future oriented, is safe for discharge at this time. A coordinated, multidisplinary treatment team round was conducted with the patient, nurses, pharmcist,  and writer present. Discussions held with , and/or with family members;  Complete current electronic health record for patient was reviewed in full including consultant notes, ancillary staff notes, nurses and tech notes, labs and vitals. I certify that this patients inpatient psychiatric hospital services furnished since the previous certification were, and continue to be, required for treatment that could reasonably be expected to improve the patient's condition, or for diagnostic study, and that the patient continues to need, on a daily basis, active treatment furnished directly by or requiring the supervision of inpatient psychiatric facility personnel. In addition, the hospital records show that services furnished were intensive treatment services, admission or related services, or equivalent services.

## 2023-03-18 NOTE — ADDENDUM NOTE
Addended by: PRAKASH GAONA on: 2/2/2022 11:58 AM     Modules accepted: Level of Service     Goal Outcome Evaluation:  Pt is alert but hard to assess his orientation due to expressive aphasia. He can follow verbal command appropriately. He was incontinent of stool one time due to urgency while the team attempted to put him on a commode. He needed a total assist of two staff for perineal care and clothing management.He needed assist of two staff using rahel steady for transfer between bed and wheel chair or commode. Family has been at bed side helping pt with activity of daily livings. We will continue with current plan of care.

## 2024-02-29 ENCOUNTER — APPOINTMENT (OUTPATIENT)
Dept: CT IMAGING | Facility: HOSPITAL | Age: 34
End: 2024-02-29
Payer: MEDICAID

## 2024-02-29 ENCOUNTER — APPOINTMENT (OUTPATIENT)
Dept: GENERAL RADIOLOGY | Facility: HOSPITAL | Age: 34
End: 2024-02-29
Payer: MEDICAID

## 2024-02-29 ENCOUNTER — HOSPITAL ENCOUNTER (OUTPATIENT)
Facility: HOSPITAL | Age: 34
Setting detail: OBSERVATION
Discharge: HOME | End: 2024-03-04
Attending: EMERGENCY MEDICINE | Admitting: INTERNAL MEDICINE
Payer: MEDICAID

## 2024-02-29 DIAGNOSIS — R00.0 SINUS TACHYCARDIA: ICD-10-CM

## 2024-02-29 DIAGNOSIS — J18.9 PNEUMONIA OF RIGHT LUNG DUE TO INFECTIOUS ORGANISM, UNSPECIFIED PART OF LUNG: ICD-10-CM

## 2024-02-29 DIAGNOSIS — F19.90 ILLICIT DRUG USE: ICD-10-CM

## 2024-02-29 DIAGNOSIS — H69.93 EUSTACHIAN TUBE DYSFUNCTION, BILATERAL: ICD-10-CM

## 2024-02-29 DIAGNOSIS — J06.9 URI WITH COUGH AND CONGESTION: ICD-10-CM

## 2024-02-29 DIAGNOSIS — J96.01 ACUTE HYPOXEMIC RESPIRATORY FAILURE: Primary | ICD-10-CM

## 2024-02-29 PROBLEM — N18.9 CKD (CHRONIC KIDNEY DISEASE): Status: ACTIVE | Noted: 2024-02-29

## 2024-02-29 PROBLEM — N17.9 AKI (ACUTE KIDNEY INJURY): Status: ACTIVE | Noted: 2024-02-29

## 2024-02-29 LAB
ALBUMIN SERPL-MCNC: 4 G/DL (ref 3.5–5.2)
ALBUMIN/GLOB SERPL: 1.5 G/DL
ALP SERPL-CCNC: 69 U/L (ref 39–117)
ALT SERPL W P-5'-P-CCNC: 25 U/L (ref 1–41)
ANION GAP SERPL CALCULATED.3IONS-SCNC: 11.4 MMOL/L (ref 5–15)
AST SERPL-CCNC: 30 U/L (ref 1–40)
BASOPHILS # BLD AUTO: 0.01 10*3/MM3 (ref 0–0.2)
BASOPHILS NFR BLD AUTO: 0.1 % (ref 0–1.5)
BILIRUB SERPL-MCNC: 0.4 MG/DL (ref 0–1.2)
BUN SERPL-MCNC: 14 MG/DL (ref 6–20)
BUN/CREAT SERPL: 10 (ref 7–25)
CALCIUM SPEC-SCNC: 8.7 MG/DL (ref 8.6–10.5)
CHLORIDE SERPL-SCNC: 97 MMOL/L (ref 98–107)
CO2 SERPL-SCNC: 26.6 MMOL/L (ref 22–29)
CREAT SERPL-MCNC: 1.4 MG/DL (ref 0.76–1.27)
D-LACTATE SERPL-SCNC: 1.8 MMOL/L (ref 0.5–2)
DEPRECATED RDW RBC AUTO: 36 FL (ref 37–54)
EGFRCR SERPLBLD CKD-EPI 2021: 67.6 ML/MIN/1.73
EOSINOPHIL # BLD AUTO: 0 10*3/MM3 (ref 0–0.4)
EOSINOPHIL NFR BLD AUTO: 0 % (ref 0.3–6.2)
ERYTHROCYTE [DISTWIDTH] IN BLOOD BY AUTOMATED COUNT: 11.6 % (ref 12.3–15.4)
FLUAV RNA RESP QL NAA+PROBE: NOT DETECTED
FLUBV RNA RESP QL NAA+PROBE: NOT DETECTED
GLOBULIN UR ELPH-MCNC: 2.7 GM/DL
GLUCOSE SERPL-MCNC: 81 MG/DL (ref 65–99)
HCT VFR BLD AUTO: 42.1 % (ref 37.5–51)
HGB BLD-MCNC: 14.1 G/DL (ref 13–17.7)
IMM GRANULOCYTES # BLD AUTO: 0.09 10*3/MM3 (ref 0–0.05)
IMM GRANULOCYTES NFR BLD AUTO: 0.6 % (ref 0–0.5)
LYMPHOCYTES # BLD AUTO: 0.94 10*3/MM3 (ref 0.7–3.1)
LYMPHOCYTES NFR BLD AUTO: 5.9 % (ref 19.6–45.3)
MCH RBC QN AUTO: 28.8 PG (ref 26.6–33)
MCHC RBC AUTO-ENTMCNC: 33.5 G/DL (ref 31.5–35.7)
MCV RBC AUTO: 85.9 FL (ref 79–97)
MONOCYTES # BLD AUTO: 1.05 10*3/MM3 (ref 0.1–0.9)
MONOCYTES NFR BLD AUTO: 6.6 % (ref 5–12)
NEUTROPHILS NFR BLD AUTO: 13.82 10*3/MM3 (ref 1.7–7)
NEUTROPHILS NFR BLD AUTO: 86.8 % (ref 42.7–76)
NRBC BLD AUTO-RTO: 0 /100 WBC (ref 0–0.2)
PLATELET # BLD AUTO: 287 10*3/MM3 (ref 140–450)
PMV BLD AUTO: 10.2 FL (ref 6–12)
POTASSIUM SERPL-SCNC: 5.3 MMOL/L (ref 3.5–5.2)
PROT SERPL-MCNC: 6.7 G/DL (ref 6–8.5)
RBC # BLD AUTO: 4.9 10*6/MM3 (ref 4.14–5.8)
RSV RNA RESP QL NAA+PROBE: NOT DETECTED
SARS-COV-2 RNA RESP QL NAA+PROBE: NOT DETECTED
SODIUM SERPL-SCNC: 135 MMOL/L (ref 136–145)
WBC NRBC COR # BLD AUTO: 15.91 10*3/MM3 (ref 3.4–10.8)

## 2024-02-29 PROCEDURE — 71275 CT ANGIOGRAPHY CHEST: CPT

## 2024-02-29 PROCEDURE — 83605 ASSAY OF LACTIC ACID: CPT | Performed by: EMERGENCY MEDICINE

## 2024-02-29 PROCEDURE — 25510000001 IOPAMIDOL PER 1 ML: Performed by: EMERGENCY MEDICINE

## 2024-02-29 PROCEDURE — 80053 COMPREHEN METABOLIC PANEL: CPT | Performed by: EMERGENCY MEDICINE

## 2024-02-29 PROCEDURE — 87040 BLOOD CULTURE FOR BACTERIA: CPT | Performed by: EMERGENCY MEDICINE

## 2024-02-29 PROCEDURE — 25010000002 HEPARIN (PORCINE) PER 1000 UNITS: Performed by: INTERNAL MEDICINE

## 2024-02-29 PROCEDURE — G0378 HOSPITAL OBSERVATION PER HR: HCPCS

## 2024-02-29 PROCEDURE — 71045 X-RAY EXAM CHEST 1 VIEW: CPT

## 2024-02-29 PROCEDURE — 25810000003 SODIUM CHLORIDE 0.9 % SOLUTION: Performed by: EMERGENCY MEDICINE

## 2024-02-29 PROCEDURE — 87637 SARSCOV2&INF A&B&RSV AMP PRB: CPT | Performed by: EMERGENCY MEDICINE

## 2024-02-29 PROCEDURE — 25010000002 PIPERACILLIN SOD-TAZOBACTAM PER 1 G: Performed by: INTERNAL MEDICINE

## 2024-02-29 PROCEDURE — 85025 COMPLETE CBC W/AUTO DIFF WBC: CPT | Performed by: EMERGENCY MEDICINE

## 2024-02-29 PROCEDURE — 36415 COLL VENOUS BLD VENIPUNCTURE: CPT

## 2024-02-29 PROCEDURE — 99285 EMERGENCY DEPT VISIT HI MDM: CPT

## 2024-02-29 PROCEDURE — 25810000003 SODIUM CHLORIDE 0.9 % SOLUTION: Performed by: INTERNAL MEDICINE

## 2024-02-29 RX ORDER — CLONAZEPAM 1 MG/1
TABLET ORAL
COMMUNITY
Start: 2024-02-13

## 2024-02-29 RX ORDER — LAMOTRIGINE 100 MG/1
200 TABLET ORAL DAILY
Status: DISCONTINUED | OUTPATIENT
Start: 2024-02-29 | End: 2024-03-04 | Stop reason: HOSPADM

## 2024-02-29 RX ORDER — SODIUM CHLORIDE 0.9 % (FLUSH) 0.9 %
10 SYRINGE (ML) INJECTION EVERY 12 HOURS SCHEDULED
Status: DISCONTINUED | OUTPATIENT
Start: 2024-02-29 | End: 2024-03-04 | Stop reason: HOSPADM

## 2024-02-29 RX ORDER — IBUPROFEN 800 MG/1
800 TABLET ORAL ONCE
Status: COMPLETED | OUTPATIENT
Start: 2024-02-29 | End: 2024-02-29

## 2024-02-29 RX ORDER — ACETAMINOPHEN 160 MG/5ML
650 SOLUTION ORAL EVERY 4 HOURS PRN
Status: DISCONTINUED | OUTPATIENT
Start: 2024-02-29 | End: 2024-03-04 | Stop reason: HOSPADM

## 2024-02-29 RX ORDER — SODIUM CHLORIDE 9 MG/ML
100 INJECTION, SOLUTION INTRAVENOUS CONTINUOUS
Status: DISCONTINUED | OUTPATIENT
Start: 2024-02-29 | End: 2024-03-04 | Stop reason: HOSPADM

## 2024-02-29 RX ORDER — AZITHROMYCIN 250 MG/1
500 TABLET, FILM COATED ORAL ONCE
Status: COMPLETED | OUTPATIENT
Start: 2024-02-29 | End: 2024-02-29

## 2024-02-29 RX ORDER — DEXTROAMPHETAMINE SACCHARATE, AMPHETAMINE ASPARTATE MONOHYDRATE, DEXTROAMPHETAMINE SULFATE AND AMPHETAMINE SULFATE 7.5; 7.5; 7.5; 7.5 MG/1; MG/1; MG/1; MG/1
30 CAPSULE, EXTENDED RELEASE ORAL 2 TIMES DAILY
Status: DISCONTINUED | OUTPATIENT
Start: 2024-02-29 | End: 2024-03-01

## 2024-02-29 RX ORDER — BENZONATATE 100 MG/1
100 CAPSULE ORAL 3 TIMES DAILY PRN
Qty: 30 CAPSULE | Refills: 0 | Status: SHIPPED | OUTPATIENT
Start: 2024-02-29

## 2024-02-29 RX ORDER — AMOXICILLIN AND CLAVULANATE POTASSIUM 875; 125 MG/1; MG/1
TABLET, FILM COATED ORAL
Qty: 20 TABLET | Refills: 0 | Status: SHIPPED | OUTPATIENT
Start: 2024-02-29

## 2024-02-29 RX ORDER — NITROGLYCERIN 0.4 MG/1
0.4 TABLET SUBLINGUAL
Status: DISCONTINUED | OUTPATIENT
Start: 2024-02-29 | End: 2024-03-04 | Stop reason: HOSPADM

## 2024-02-29 RX ORDER — FOLIC ACID 1 MG/1
1 TABLET ORAL DAILY
COMMUNITY
Start: 2023-04-07 | End: 2024-04-06

## 2024-02-29 RX ORDER — FOLIC ACID 1 MG/1
1 TABLET ORAL DAILY
Status: DISCONTINUED | OUTPATIENT
Start: 2024-02-29 | End: 2024-03-04 | Stop reason: HOSPADM

## 2024-02-29 RX ORDER — ACETAMINOPHEN 650 MG/1
650 SUPPOSITORY RECTAL EVERY 4 HOURS PRN
Status: DISCONTINUED | OUTPATIENT
Start: 2024-02-29 | End: 2024-03-04 | Stop reason: HOSPADM

## 2024-02-29 RX ORDER — SODIUM CHLORIDE 9 MG/ML
40 INJECTION, SOLUTION INTRAVENOUS AS NEEDED
Status: DISCONTINUED | OUTPATIENT
Start: 2024-02-29 | End: 2024-03-04 | Stop reason: HOSPADM

## 2024-02-29 RX ORDER — AMOXICILLIN AND CLAVULANATE POTASSIUM 875; 125 MG/1; MG/1
1 TABLET, FILM COATED ORAL ONCE
Status: COMPLETED | OUTPATIENT
Start: 2024-02-29 | End: 2024-02-29

## 2024-02-29 RX ORDER — SODIUM CHLORIDE 0.9 % (FLUSH) 0.9 %
10 SYRINGE (ML) INJECTION AS NEEDED
Status: DISCONTINUED | OUTPATIENT
Start: 2024-02-29 | End: 2024-03-04 | Stop reason: HOSPADM

## 2024-02-29 RX ORDER — ONDANSETRON 2 MG/ML
4 INJECTION INTRAMUSCULAR; INTRAVENOUS EVERY 6 HOURS PRN
Status: DISCONTINUED | OUTPATIENT
Start: 2024-02-29 | End: 2024-03-04 | Stop reason: HOSPADM

## 2024-02-29 RX ORDER — HEPARIN SODIUM 5000 [USP'U]/ML
5000 INJECTION, SOLUTION INTRAVENOUS; SUBCUTANEOUS EVERY 12 HOURS SCHEDULED
Status: DISCONTINUED | OUTPATIENT
Start: 2024-02-29 | End: 2024-03-01

## 2024-02-29 RX ORDER — ACETAMINOPHEN 325 MG/1
650 TABLET ORAL EVERY 4 HOURS PRN
Status: DISCONTINUED | OUTPATIENT
Start: 2024-02-29 | End: 2024-03-04 | Stop reason: HOSPADM

## 2024-02-29 RX ORDER — AZITHROMYCIN 250 MG/1
TABLET, FILM COATED ORAL
Qty: 4 TABLET | Refills: 0 | Status: SHIPPED | OUTPATIENT
Start: 2024-02-29

## 2024-02-29 RX ORDER — CLONAZEPAM 0.5 MG/1
0.5 TABLET ORAL 2 TIMES DAILY PRN
Status: DISCONTINUED | OUTPATIENT
Start: 2024-02-29 | End: 2024-03-04 | Stop reason: HOSPADM

## 2024-02-29 RX ADMIN — PIPERACILLIN SODIUM AND TAZOBACTAM SODIUM 3.38 G: 3; .375 INJECTION, SOLUTION INTRAVENOUS at 22:49

## 2024-02-29 RX ADMIN — SODIUM CHLORIDE 1000 ML: 9 INJECTION, SOLUTION INTRAVENOUS at 12:20

## 2024-02-29 RX ADMIN — IBUPROFEN 800 MG: 800 TABLET, FILM COATED ORAL at 13:10

## 2024-02-29 RX ADMIN — SODIUM CHLORIDE 100 ML/HR: 9 INJECTION, SOLUTION INTRAVENOUS at 17:47

## 2024-02-29 RX ADMIN — FOLIC ACID 1 MG: 1 TABLET ORAL at 17:46

## 2024-02-29 RX ADMIN — AMOXICILLIN AND CLAVULANATE POTASSIUM 1 TABLET: 875; 125 TABLET, FILM COATED ORAL at 14:07

## 2024-02-29 RX ADMIN — IOPAMIDOL 95 ML: 755 INJECTION, SOLUTION INTRAVENOUS at 15:43

## 2024-02-29 RX ADMIN — Medication 10 ML: at 20:23

## 2024-02-29 RX ADMIN — LAMOTRIGINE 200 MG: 100 TABLET ORAL at 17:46

## 2024-02-29 RX ADMIN — AZITHROMYCIN 500 MG: 250 TABLET, FILM COATED ORAL at 14:07

## 2024-02-29 RX ADMIN — SODIUM CHLORIDE 1000 ML: 9 INJECTION, SOLUTION INTRAVENOUS at 14:55

## 2024-02-29 RX ADMIN — PHENYLEPHRINE HYDROCHLORIDE 2 SPRAY: 0.5 SPRAY NASAL at 14:07

## 2024-02-29 RX ADMIN — PIPERACILLIN SODIUM AND TAZOBACTAM SODIUM 3.38 G: 3; .375 INJECTION, SOLUTION INTRAVENOUS at 17:46

## 2024-02-29 NOTE — H&P
Internal medicine history and physical  INTERNAL MEDICINE   [facility]       Patient Identification:  Name: Davin Booker  Age: 34 y.o.  Sex: male  :  1990  MRN: [id number]                   Primary Care Physician: Provider, No Known                               Date of admission:2024    Chief Complaint: Not feeling very well with declining hearing in bilateral ears with cough congestion and fatigue and weakness.  Symptomatic going on for the last few days.    History of Present Illness:   Patient is a 34-year-old male, who is not a forthcoming historian and requires lots of effort to gather history, has history of attention deficit hyperactivity disorder, bipolar disorder, anxiety and self-admitted use of marijuana and heroin who is currently living with his friends started having cough congestion and sore throat and started to lose his voice few days ago.  He started having stuffiness and congested feeling with cough congestion and worsening hearing in the last couple of hours.  He described this as hearing metallic sound out in the distance.  Because of the symptoms his friend brought him to the emergency room for further evaluation where workup revealed right-sided pneumonia, leukocytosis and renal insufficiency as well as potassium of 5.3.  Patient reported that he has been exposed to somebody who was tested positive for influenza.  Workup in the emergency room shows his respiratory viral panel being negative.  Initially it was thought the patient could be discharged on oral antibiotics with plans to follow-up with his primary care physician as he was noted to her good oxygen saturation on room air.  While patient being discharged he became hypoxic upon ambulation and diaphoretic with tachycardia.  Additional IV fluid was given and at that time CT scan of the chest PE protocol was performed which showed evidence of large infiltrate not initially seen on the chest x-ray.  No  evidence of PE.  Patient is being admitted for further care.      Past Medical History:  Past Medical History:   Diagnosis Date    ADHD (attention deficit hyperactivity disorder)     Bipolar 1 disorder     Bipolar 1 disorder      Past Surgical History:  Past Surgical History:   Procedure Laterality Date    TONSILLECTOMY        Home Meds:  Medications Prior to Admission   Medication Sig Dispense Refill Last Dose    clonazePAM (KlonoPIN) 1 MG tablet        folic acid (FOLVITE) 1 MG tablet Take 1 tablet by mouth Daily.       amphetamine-dextroamphetamine XR (Adderall XR) 30 MG 24 hr capsule Take 30 mg by mouth 2 (Two) Times a Day       ARIPiprazole (ABILIFY) 2 MG tablet Take 2 mg by mouth Daily.       lamoTRIgine (LaMICtal) 100 MG tablet Take 200 mg by mouth Daily.        Current Meds:     Current Facility-Administered Medications:     phenylephrine (ABHINAV-SYNEPHRINE) 0.5 % nasal spray 2 spray, 2 spray, Nasal, Once, Leonardo Bazzi MD, 2 spray at 02/29/24 1407    [COMPLETED] Insert Peripheral IV, , , Once **AND** sodium chloride 0.9 % flush 10 mL, 10 mL, Intravenous, PRN, Leonardo Bazzi MD  Allergies:  No Known Allergies  Social History:   Social History     Tobacco Use    Smoking status: Former    Smokeless tobacco: Never    Tobacco comments:     2019   Substance Use Topics    Alcohol use: Yes     Comment: occasional      Family History:  Family History   Problem Relation Age of Onset    No Known Problems Mother     No Known Problems Father           Review of Systems  See history of present illness and past medical history.  Patient denies headache, dizziness, syncope, falls, trauma, change in vision, change in hearing, change in taste, changes in weight, changes in appetite, focal weakness, numbness, or paresthesia.  Patient denies chest pain, palpitations, dyspnea, orthopnea, PND, cough, sinus pressure, rhinorrhea, epistaxis, hemoptysis, nausea, vomiting,hematemesis, diarrhea, constipation or hematchezia.  Denies cold  or heat intolerance, polydipsia, polyuria, polyphagia. Denies hematuria, pyuria, dysuria, hesitancy, frequency or urgency. Denies consumption of raw and under cooked meats foods or change in water source.  Denies fever, chills, sweats, night sweats.  Denies missing any routine medications. Remainder of ROS is negative.      Vitals:   /74   Pulse 114   Temp 97.3 °F (36.3 °C) (Tympanic)   Resp 16   SpO2 99%   I/O:   Intake/Output Summary (Last 24 hours) at 2/29/2024 1604  Last data filed at 2/29/2024 1250  Gross per 24 hour   Intake 1000 ml   Output --   Net 1000 ml     Exam:  Patient is examined using the personal protective equipment as per guidelines from infection control for this particular patient as enacted.  Hand washing was performed before and after patient interaction.  General Appearance:  Withdrawn but interactive male does not appear to be in any acute distress   Head:    Normocephalic, without obvious abnormality, atraumatic   Eyes:    PERRL, conjunctiva/corneas clear, EOM's intact, both eyes   Ears:    Normal external ear canals, both ears   Nose:   Nares normal, septum midline, mucosa normal, no drainage    or sinus tenderness   Throat:   Lips, tongue, gums normal; oral mucosa pink and moist   Neck:   Supple, symmetrical, trachea midline, no adenopathy;     thyroid:  no enlargement/tenderness/nodules; no carotid    bruit or JVD   Back:     Symmetric, no curvature, ROM normal, no CVA tenderness   Lungs:   Decreased breath sounds bilaterally right greater than left   Chest Wall:    No tenderness or deformity    Heart:  S1-S2 tachycardic   Abdomen:   Soft nontender   Extremities:   Extremities normal, atraumatic, no cyanosis or edema   Pulses:   Pulses palpable in all extremities; symmetric all extremities   Skin: No rash noted   Neurologic: Grossly nonfocal       Data Review:      I reviewed the patient's new clinical results.  Results from last 7 days   Lab Units 02/29/24  1213   WBC 10*3/mm3  15.91*   HEMOGLOBIN g/dL 14.1   PLATELETS 10*3/mm3 287     Results from last 7 days   Lab Units 02/29/24  1213   SODIUM mmol/L 135*   POTASSIUM mmol/L 5.3*   CHLORIDE mmol/L 97*   CO2 mmol/L 26.6   BUN mg/dL 14   CREATININE mg/dL 1.40*   CALCIUM mg/dL 8.7   GLUCOSE mg/dL 81   No radiology results for the last 30 days.  Microbiology Results (last 10 days)       Procedure Component Value - Date/Time    COVID-19, FLU A/B, RSV PCR 1 HR TAT - Swab, Nasopharynx [[id number]]  (Normal) Collected: 02/29/24 1204    Lab Status: Final result Specimen: Swab from Nasopharynx Updated: 02/29/24 1259     COVID19 Not Detected     Influenza A PCR Not Detected     Influenza B PCR Not Detected     RSV, PCR Not Detected    Narrative:      Fact sheet for providers: https://www.fda.gov/media/447226/download    Fact sheet for patients: https://www.fda.gov/media/042060/download    Test performed by PCR.          Brief Urine Lab Results       None            Assessment:  Active Hospital Problems    Diagnosis  POA    **Pneumonia involving right lung [J18.9]  Yes    LANDON (acute kidney injury) [N17.9]  Unknown    CKD (chronic kidney disease) [N18.9]  Unknown    ADHD (attention deficit hyperactivity disorder) [F90.9]  Yes    Bipolar 1 disorder [F31.9]  Yes       Medical decision making/care plan: See admitting orders  Pneumonia involving the right lung with underlying etiology could range from community-acquired pneumonia after recent viral illness given his upper respiratory tract symptoms followed by this presentation versus aspiration while being under the influence of heroin or marijuana-plan is to admit the patient continue with IV antibiotics and follow-up on blood cultures and continue with IV fluids and monitor his lactic acid level and procalcitonin level.  Adjust antibiotic therapy based on the culture results.  Acute kidney injury on chronic kidney disease-likely multifactorial including dehydration and underlying sepsis due to  pneumonia-continue with IV fluids avoid nephrotoxic agent and hypotensive episodes.  Monitor his kidney function as he received contrast scan for PE.  If his pneumonia improves on current regimen and his renal function gets better then obviously nothing needs to be done except to treat his underlying sepsis and pneumonia and dehydration.  But if his creatinine continues to persist in the setting of abnormal infiltrate in the lung then workup for renal pulmonary processes need to be considered in this young man.  History of substance abuse-check urine drug screen and access evaluation.  History of attention deficit hyperactivity disorder and bipolar disorder-continue his home regimen and access evaluation  Psychosocial situation including current residency with friends and documented substance abuse-social work and case management consultation.    Yaa Arnold MD   2/29/2024  16:04 EST    Parts of this note may be an electronic transcription/translation of spoken language to printed text using the Dragon dictation system.

## 2024-02-29 NOTE — ED NOTES
Attempted to discharge pt. Pt stated that he wanted to speak to the doctor again and would feel more comfortable staying as opposed to being discharged. Jluis VILLANUEVA notified.

## 2024-02-29 NOTE — Clinical Note
[facility] EMERGENCY DEPARTMENT  [address] [facility] [address]  Phone: [phone]    Davin Booker was seen and treated in our emergency department on 2/29/2024.  He may return to work on 03/02/2024.         Thank you for choosing [facility].    Leonardo Bazzi MD

## 2024-02-29 NOTE — ED PROVIDER NOTES
EMERGENCY DEPARTMENT ENCOUNTER  Room Number:  31/31  PCP: Provider, No Known  Independent Historians: Patient and friend who joined him at bedside      HPI:  Chief Complaint: Cough congestion     A complete HPI/ROS/PMH/PSH/SH/FH are unobtainable due to: None    Chronic or social conditions impacting patient care (Social Determinants of Health): None      Context: Davin Booker is a 34 y.o. male with a medical history of substance use who presents to the ED c/o acute URI, cough congestion symptoms over 2 to 3 days now with diminished hearing bilateral ears like he is down the hallway and hearing a metallic sound.  No reported fevers.  No hemoptysis.  Patient admits to regular Klonopin use but denies any recent heroin use.  No clear exacerbating relieving factors.  Patient tells me that he is freaking out this morning.    Review of prior external notes (non-ED) -and- Review of prior external test results outside of this encounter:  EMR reveals follow-up with family medicine for ADD and psychiatric medication dosing.    Further evaluation of the patient's record also reveals that he has a history of illicit substance use as well as intentional overdose      PAST MEDICAL HISTORY  Active Ambulatory Problems     Diagnosis Date Noted    ADHD (attention deficit hyperactivity disorder)     Bipolar 1 disorder      Resolved Ambulatory Problems     Diagnosis Date Noted    No Resolved Ambulatory Problems     No Additional Past Medical History         PAST SURGICAL HISTORY  Past Surgical History:   Procedure Laterality Date    TONSILLECTOMY           FAMILY HISTORY  Family History   Problem Relation Age of Onset    No Known Problems Mother     No Known Problems Father          SOCIAL HISTORY  Social History     Socioeconomic History    Marital status:    Tobacco Use    Smoking status: Former    Smokeless tobacco: Never    Tobacco comments:     2019   Substance and Sexual Activity    Alcohol use: Yes     Comment:  occasional    Drug use: Yes     Types: Marijuana, Cocaine(coke)     Comment: heroin         ALLERGIES  Patient has no known allergies.      REVIEW OF SYSTEMS  Review of Systems  Included in HPI  All systems reviewed and negative except for those discussed in HPI.      PHYSICAL EXAM    I have reviewed the triage vital signs and nursing notes.    ED Triage Vitals   Temp Heart Rate Resp BP SpO2   02/29/24 1135 02/29/24 1135 02/29/24 1135 02/29/24 1138 02/29/24 1135   97.3 °F (36.3 °C) (!) 136 16 107/79 91 %      Temp src Heart Rate Source Patient Position BP Location FiO2 (%)   02/29/24 1135 02/29/24 1135 02/29/24 1138 -- --   Tympanic Monitor Lying         Physical Exam    Physical Exam   Constitutional: No distress.  Nontoxic but drowsy and ill-appearing.  Patient is disheveled with some food debris and what looks like emesis on his shirt.  HENT: Bilateral TM retraction with no erythema  Head: Normocephalic and atraumatic.   Oropharynx: Mucous membranes are moist.  Moderate posterior pharyngeal erythema without tonsillar hypertrophy or exudate.  Tolerating secretions without difficulty.  Eyes: . No scleral icterus. No conjunctival pallor.  Neck: Normal range of motion. Neck supple.  Meningismus  Cardiovascular: Pink warm and well perfused throughout.  Regular but tachycardic  Pulmonary/Chest: No respiratory distress.  Occasional wet sounding cough.  Right-sided basilar rhonchi.  Abdominal: Soft. There is no tenderness. There is no rebound and no guarding.  Benign  Musculoskeletal: Moves all extremities equally.    Neurological: Sleepy but wakes easily to verbal stimuli.  Slow and methodical with speech.  No acute focal deficit appreciated.  Skin: Skin is pink, warm, and dry.   Psychiatric: Mood and affect normal.   Nursing note and vitals reviewed.             LAB RESULTS  Recent Results (from the past 24 hour(s))   COVID-19, FLU A/B, RSV PCR 1 HR TAT - Swab, Nasopharynx    Collection Time: 02/29/24 12:04 PM     Specimen: Nasopharynx; Swab   Result Value Ref Range    COVID19 Not Detected Not Detected - Ref. Range    Influenza A PCR Not Detected Not Detected    Influenza B PCR Not Detected Not Detected    RSV, PCR Not Detected Not Detected   Comprehensive Metabolic Panel    Collection Time: 02/29/24 12:13 PM    Specimen: Arm, Right; Blood   Result Value Ref Range    Glucose 81 65 - 99 mg/dL    BUN 14 6 - 20 mg/dL    Creatinine 1.40 (H) 0.76 - 1.27 mg/dL    Sodium 135 (L) 136 - 145 mmol/L    Potassium 5.3 (H) 3.5 - 5.2 mmol/L    Chloride 97 (L) 98 - 107 mmol/L    CO2 26.6 22.0 - 29.0 mmol/L    Calcium 8.7 8.6 - 10.5 mg/dL    Total Protein 6.7 6.0 - 8.5 g/dL    Albumin 4.0 3.5 - 5.2 g/dL    ALT (SGPT) 25 1 - 41 U/L    AST (SGOT) 30 1 - 40 U/L    Alkaline Phosphatase 69 39 - 117 U/L    Total Bilirubin 0.4 0.0 - 1.2 mg/dL    Globulin 2.7 gm/dL    A/G Ratio 1.5 g/dL    BUN/Creatinine Ratio 10.0 7.0 - 25.0    Anion Gap 11.4 5.0 - 15.0 mmol/L    eGFR 67.6 >60.0 mL/min/1.73   CBC Auto Differential    Collection Time: 02/29/24 12:13 PM    Specimen: Arm, Right; Blood   Result Value Ref Range    WBC 15.91 (H) 3.40 - 10.80 10*3/mm3    RBC 4.90 4.14 - 5.80 10*6/mm3    Hemoglobin 14.1 13.0 - 17.7 g/dL    Hematocrit 42.1 37.5 - 51.0 %    MCV 85.9 79.0 - 97.0 fL    MCH 28.8 26.6 - 33.0 pg    MCHC 33.5 31.5 - 35.7 g/dL    RDW 11.6 (L) 12.3 - 15.4 %    RDW-SD 36.0 (L) 37.0 - 54.0 fl    MPV 10.2 6.0 - 12.0 fL    Platelets 287 140 - 450 10*3/mm3    Neutrophil % 86.8 (H) 42.7 - 76.0 %    Lymphocyte % 5.9 (L) 19.6 - 45.3 %    Monocyte % 6.6 5.0 - 12.0 %    Eosinophil % 0.0 (L) 0.3 - 6.2 %    Basophil % 0.1 0.0 - 1.5 %    Immature Grans % 0.6 (H) 0.0 - 0.5 %    Neutrophils, Absolute 13.82 (H) 1.70 - 7.00 10*3/mm3    Lymphocytes, Absolute 0.94 0.70 - 3.10 10*3/mm3    Monocytes, Absolute 1.05 (H) 0.10 - 0.90 10*3/mm3    Eosinophils, Absolute 0.00 0.00 - 0.40 10*3/mm3    Basophils, Absolute 0.01 0.00 - 0.20 10*3/mm3    Immature Grans, Absolute 0.09  (H) 0.00 - 0.05 10*3/mm3    nRBC 0.0 0.0 - 0.2 /100 WBC   Lactic Acid, Plasma    Collection Time: 02/29/24 12:13 PM    Specimen: Arm, Right; Blood   Result Value Ref Range    Lactate 1.8 0.5 - 2.0 mmol/L         RADIOLOGY  XR Chest 1 View    Result Date: 2/29/2024  XR CHEST 1 VW-  Clinical: Cough  FINDINGS: The left lung is clear. Heart size within normal limits. No mediastinal or hilar abnormality. There is vague infiltrate demonstrated within the right mid and lower lung zone. No effusion or edema seen. The remainder is unremarkable.  This report was finalized on 2/29/2024 12:38 PM by Dr. Wale Yanes M.D on Workstation: [id number]         MEDICATIONS GIVEN IN ER  Medications   sodium chloride 0.9 % flush 10 mL (has no administration in time range)   phenylephrine (ABHINAV-SYNEPHRINE) 0.5 % nasal spray 2 spray (2 sprays Nasal Given 2/29/24 1407)   sodium chloride 0.9 % bolus 1,000 mL (has no administration in time range)   sodium chloride 0.9 % bolus 1,000 mL (0 mL Intravenous Stopped 2/29/24 1250)   ibuprofen (ADVIL,MOTRIN) tablet 800 mg (800 mg Oral Given 2/29/24 1310)   amoxicillin-clavulanate (AUGMENTIN) 875-125 MG per tablet 1 tablet (1 tablet Oral Given 2/29/24 1407)   azithromycin (ZITHROMAX) tablet 500 mg (500 mg Oral Given 2/29/24 1407)         ORDERS PLACED DURING THIS VISIT:  Orders Placed This Encounter   Procedures    COVID-19, FLU A/B, RSV PCR 1 HR TAT - Swab, Nasopharynx    Blood Culture - Blood,    Blood Culture - Blood,    XR Chest 1 View    CT Angiogram Chest Pulmonary Embolism    Comprehensive Metabolic Panel    CBC Auto Differential    Lactic Acid, Plasma    Urinalysis With Microscopic If Indicated (No Culture) - Urine, Clean Catch    Urine Drug Screen - Urine, Clean Catch    Ambulate patient    [facility] (on-call MD unless specified) Details    Discontinue Patient Isolation    Insert Peripheral IV    Initiate Observation Status    CBC & Differential         OUTPATIENT MEDICATION MANAGEMENT:  Current  Facility-Administered Medications Ordered in Epic   Medication Dose Route Frequency Provider Last Rate Last Admin    phenylephrine (ABHINAV-SYNEPHRINE) 0.5 % nasal spray 2 spray  2 spray Nasal Once Leonardo Bazzi MD   2 spray at 02/29/24 1407    sodium chloride 0.9 % bolus 1,000 mL  1,000 mL Intravenous Once Leonardo Bazzi MD        sodium chloride 0.9 % flush 10 mL  10 mL Intravenous PRN Leonardo Bazzi MD         Current Outpatient Medications Ordered in Epic   Medication Sig Dispense Refill    clonazePAM (KlonoPIN) 1 MG tablet       folic acid (FOLVITE) 1 MG tablet Take 1 tablet by mouth Daily.      amoxicillin-clavulanate (AUGMENTIN) 875-125 MG per tablet Take 1 tablet by mouth 2 (two) times a day. 20 tablet 0    amphetamine-dextroamphetamine XR (Adderall XR) 30 MG 24 hr capsule Take 30 mg by mouth 2 (Two) Times a Day      ARIPiprazole (ABILIFY) 2 MG tablet Take 2 mg by mouth Daily.      azithromycin (ZITHROMAX) 250 MG tablet Take 1 tablet by mouth daily for 4 days. 4 tablet 0    benzonatate (TESSALON) 100 MG capsule Take 1 capsule by mouth 3 (Three) Times a Day As Needed for Cough. 30 capsule 0    lamoTRIgine (LaMICtal) 100 MG tablet Take 200 mg by mouth Daily.           PROCEDURES  Procedures            PROGRESS, DATA ANALYSIS, CONSULTS, AND MEDICAL DECISION MAKING  All labs have been independently interpreted by me.  All radiology studies have been reviewed by me. All EKG's have been independently viewed and interpreted by me.  Discussion below represents my analysis of pertinent findings related to patient's condition, differential diagnosis, treatment plan and final disposition.    Differential diagnosis:   My differential diagnosis for cough includes but is not limited to:  Upper respiratory infection, bronchitis, pneumonia, COPD exacerbation, cough variant asthma, cardiac asthma, coronary artery disease, congestive heart failure, bacterial, viral or lung infections, lung cancer, aspiration pneumonitis,  aspiration of foreign body and Covid -19      Clinical Scores:                  ED Course as of 02/29/24 1431   Thu Feb 29, 2024   1318 BUN: 14 [RS]   1319 Creatinine(!): 1.40  Worsened since 5 years ago. [RS]   1319 Potassium(!): 5.3  Very mild, IV hydration undertaken. [RS]   1319 ALT (SGPT): 25 [RS]   1319 AST (SGOT): 30 [RS]   1319 Total Bilirubin: 0.4 [RS]   1319 Lactate: 1.8 [RS]   1319 WBC(!): 15.91 [RS]   1319 Hemoglobin: 14.1 [RS]   1319 Immature Grans, Absolute(!): 0.09 [RS]   1319 Influenza A PCR: Not Detected [RS]   1319 Influenza B PCR: Not Detected [RS]   1319 COVID19: Not Detected [RS]   1319 BP: 102/65 [RS]   1319 RADIOLOGY      Study: Single view chest  Findings: Clear left lung; atelectasis right lung  I independently viewed and interpreted these images contemporaneously with treatment.    [RS]   1320 At some point, the patient wound up on supplemental oxygen.  We will try an ambulatory oxygen saturation on room air to see how he tolerates this.  If no supplemental oxygen is required, the patient will be a good candidate for discharge and outpatient follow-up [RS]   1320 . [RS]   1323 Patient ambulated with good room air oxygen saturation.  Plan discharge home with antibiotics and recommendation for outpatient follow-up with PCP. [RS]   1341 Patient admits to probable sleep apnea with significant snoring at home.  He has not followed up on this.  When he wakes up his oxygen level is normal.  No indication for admission at this time.  Further use of illicit benzodiazepines is not recommended.  Patient agreeable discharge plan. [RS]   1422  follow-up.  Patient for discharge, he became hypoxemic again.  When we wake him it does come back up a bit but he is now diaphoretic and persistently tachycardic.  An additional dose of IV fluids ordered.  The patient has already achieved oral antibiotics so we will hold off on further IV antibiotics at this time.  I have added a CT angiography of the chest to  ensure no PE or large focal infiltrate that was not identified on chest x-ray.  Patient agreeable with admission plan at this time. [RS]   1430 CONSULT        Provider: Dr. Arnold - [facility]    Discussion: Reviewed patient history, ED presentation and evaluation as well as failure to improve with medications here in the ED.  He is agreeable to accept the patient for admission.    Agreeable c treatment and planned disposition.         [RS]      ED Course User Index  [RS] Leonardo Bazzi MD         Prescription drug monitoring program review:     AS OF 14:31 EST VITALS:    BP - 94/70  HR - (!) 125  TEMP - 97.3 °F (36.3 °C) (Tympanic)  O2 SATS - 95%    COMPLEXITY OF CARE  Patient requires admission      DIAGNOSIS  Final diagnoses:   URI with cough and congestion   Pneumonia of right lung due to infectious organism, unspecified part of lung   Illicit drug use   Eustachian tube dysfunction, bilateral   Sinus tachycardia   Acute hypoxemic respiratory failure         DISPOSITION  ED Disposition       ED Disposition   Decision to Admit    Condition   --    Comment   Level of Care: Telemetry [5]   Diagnosis: Pneumonia involving right lung [[id number]]   Admitting Physician: BRAYAN ARNOLD [[id number]]                    ADMISSION    Discussed treatment plan and reason for admission with pt/family and admitting physician.  Pt/family voiced understanding of the plan for admission for further testing/treatment as needed.         Please note that portions of this document were completed with a voice recognition program.    Note Disclaimer: At [facility], we believe that sharing information builds trust and better relationships. You are receiving this note because you recently visited [facility]. It is possible you will see health information before a provider has talked with you about it. This kind of information can be easy to misunderstand. To help you fully understand what it means for your health, we urge you to discuss this note with  your provider.            Leonardo Bazzi MD  02/29/24 1433

## 2024-02-29 NOTE — ED NOTES
Nursing report ED to floor  Davin HAYWOOD Winter  34 y.o.  male    HPI :  HPI (Adult)  Stated Reason for Visit: sudden hearing loss    Chief Complaint  Chief Complaint   Patient presents with    Hearing Loss       Admitting doctor:   Yaa Arnold MD    Admitting diagnosis:   The primary encounter diagnosis was Acute hypoxemic respiratory failure. Diagnoses of URI with cough and congestion, Pneumonia of right lung due to infectious organism, unspecified part of lung, Illicit drug use, Eustachian tube dysfunction, bilateral, and Sinus tachycardia were also pertinent to this visit.    Code status:   Current Code Status       Date Active Code Status Order ID Comments User Context       Not on file            Allergies:   Patient has no known allergies.    Isolation:   No active isolations    Intake and Output    Intake/Output Summary (Last 24 hours) at 2/29/2024 1502  Last data filed at 2/29/2024 1250  Gross per 24 hour   Intake 1000 ml   Output --   Net 1000 ml       Weight:   There were no vitals filed for this visit.    Most recent vitals:   Vitals:    02/29/24 1200 02/29/24 1312 02/29/24 1401 02/29/24 1431   BP: 101/65 102/65 94/70 102/74   BP Location:  Left arm     Patient Position:  Lying     Pulse: (!) 125 120 (!) 125 114   Resp: 16 16 16 16   Temp:       TempSrc:       SpO2: 94% 99% 95% 99%       Active LDAs/IV Access:   Lines, Drains & Airways       Active LDAs       Name Placement date Placement time Site Days    Peripheral IV 02/29/24 1213 Right Antecubital 02/29/24  1213  Antecubital  less than 1                    Labs (abnormal labs have a star):   Labs Reviewed   COMPREHENSIVE METABOLIC PANEL - Abnormal; Notable for the following components:       Result Value    Creatinine 1.40 (*)     Sodium 135 (*)     Potassium 5.3 (*)     Chloride 97 (*)     All other components within normal limits    Narrative:     GFR Normal >60  Chronic Kidney Disease <60  Kidney Failure <15     CBC WITH AUTO DIFFERENTIAL -  Abnormal; Notable for the following components:    WBC 15.91 (*)     RDW 11.6 (*)     RDW-SD 36.0 (*)     Neutrophil % 86.8 (*)     Lymphocyte % 5.9 (*)     Eosinophil % 0.0 (*)     Immature Grans % 0.6 (*)     Neutrophils, Absolute 13.82 (*)     Monocytes, Absolute 1.05 (*)     Immature Grans, Absolute 0.09 (*)     All other components within normal limits   COVID-19/FLUA&B/RSV, NP SWAB IN TRANSPORT MEDIA 1 HR TAT - Normal    Narrative:     Fact sheet for providers: https://www.fda.gov/media/023973/download    Fact sheet for patients: https://www.fda.gov/media/455640/download    Test performed by PCR.   LACTIC ACID, PLASMA - Normal   BLOOD CULTURE   BLOOD CULTURE   URINALYSIS W/ MICROSCOPIC IF INDICATED (NO CULTURE)   URINE DRUG SCREEN   CBC AND DIFFERENTIAL    Narrative:     The following orders were created for panel order CBC & Differential.  Procedure                               Abnormality         Status                     ---------                               -----------         ------                     CBC Auto Differential[[id number]]        Abnormal            Final result                 Please view results for these tests on the individual orders.       EKG:   No orders to display       Meds given in ED:   Medications   sodium chloride 0.9 % flush 10 mL (has no administration in time range)   phenylephrine (ABHINAV-SYNEPHRINE) 0.5 % nasal spray 2 spray (2 sprays Nasal Given 2/29/24 1407)   sodium chloride 0.9 % bolus 1,000 mL (1,000 mL Intravenous New Bag 2/29/24 1455)   sodium chloride 0.9 % bolus 1,000 mL (0 mL Intravenous Stopped 2/29/24 1250)   ibuprofen (ADVIL,MOTRIN) tablet 800 mg (800 mg Oral Given 2/29/24 1310)   amoxicillin-clavulanate (AUGMENTIN) 875-125 MG per tablet 1 tablet (1 tablet Oral Given 2/29/24 1407)   azithromycin (ZITHROMAX) tablet 500 mg (500 mg Oral Given 2/29/24 1407)       Imaging results:  No radiology results for the last day    Ambulatory status:   Up w/ standby  assist    Social issues:   Social History     Socioeconomic History    Marital status:    Tobacco Use    Smoking status: Former    Smokeless tobacco: Never    Tobacco comments:     2019   Substance and Sexual Activity    Alcohol use: Yes     Comment: occasional    Drug use: Yes     Types: Marijuana, Cocaine(coke)     Comment: heroin       Peripheral Neurovascular  Peripheral Neurovascular (Adult)  Peripheral Neurovascular WDL: WDL    Neuro Cognitive  Neuro Cognitive (Adult)  Cognitive/Neuro/Behavioral WDL: .WDL except, mood/behavior  Mood/Behavior: hypoactive (quiet, withdrawn)    Learning  Learning Assessment (Adult)  Learning Readiness and Ability: no barriers identified    Respiratory  Respiratory (Adult)  Airway WDL: WDL  Respiratory WDL  Respiratory WDL: WDL    Abdominal Pain       Pain Assessments  Pain (Adult)  (0-10) Pain Rating: Rest: 0  (0-10) Pain Rating: Activity: 0    NIH Stroke Scale       Lita Briceño RN  02/29/24 15:02 EST

## 2024-02-29 NOTE — PROGRESS NOTES
[facility] Clinical Pharmacy Services: Piperacillin-Tazobactam Consult    Pt Name: Davin Booker   : 1990    Indication: Pneumonia    Relevant clinical data and objective history reviewed:    Past Medical History:   Diagnosis Date    ADHD (attention deficit hyperactivity disorder)     Bipolar 1 disorder     Bipolar 1 disorder      Creatinine   Date Value Ref Range Status   2024 1.40 (H) 0.76 - 1.27 mg/dL Final   2018 1.05 0.76 - 1.27 mg/dL Final     BUN   Date Value Ref Range Status   2024 14 6 - 20 mg/dL Final     Estimated Creatinine Clearance: 76 mL/min (A) (by C-G formula based on SCr of 1.4 mg/dL (H)).    Lab Results   Component Value Date    WBC 15.91 (H) 2024     Temp Readings from Last 3 Encounters:   24 97.3 °F (36.3 °C) (Tympanic)   22 97.5 °F (36.4 °C)   07/15/21 98.3 °F (36.8 °C)      Assessment/Plan  Estimated CrCl >20 mL/min at this time; BMI 28.19 kg/m2  Will start piperacillin-tazobactam 3.375 g IV every 8 hours for 5 days    Pharmacy will continue to follow daily while on piperacillin-tazobactam and adjust as needed. Thank you for this consult.    Dolores Foote, [facility]  Clinical Pharmacist

## 2024-02-29 NOTE — ED NOTES
Informed pt a urine specimen is needed. Pt states they do not need to urinate at this time. Specimen collection kit left at bedside, instructions for usage given to pt. Pt advised to alert staff via call light when able to urinate . Call light within reach.

## 2024-03-01 LAB
ALBUMIN SERPL-MCNC: 3.5 G/DL (ref 3.5–5.2)
ALBUMIN/GLOB SERPL: 1.7 G/DL
ALP SERPL-CCNC: 54 U/L (ref 39–117)
ALT SERPL W P-5'-P-CCNC: 26 U/L (ref 1–41)
AMPHET+METHAMPHET UR QL: POSITIVE
ANION GAP SERPL CALCULATED.3IONS-SCNC: 9 MMOL/L (ref 5–15)
AST SERPL-CCNC: 38 U/L (ref 1–40)
BARBITURATES UR QL SCN: NEGATIVE
BASOPHILS # BLD AUTO: 0.02 10*3/MM3 (ref 0–0.2)
BASOPHILS NFR BLD AUTO: 0.3 % (ref 0–1.5)
BENZODIAZ UR QL SCN: POSITIVE
BILIRUB SERPL-MCNC: 0.4 MG/DL (ref 0–1.2)
BILIRUB UR QL STRIP: NEGATIVE
BUN SERPL-MCNC: 17 MG/DL (ref 6–20)
BUN/CREAT SERPL: 16 (ref 7–25)
CALCIUM SPEC-SCNC: 8.3 MG/DL (ref 8.6–10.5)
CANNABINOIDS SERPL QL: POSITIVE
CHLORIDE SERPL-SCNC: 103 MMOL/L (ref 98–107)
CLARITY UR: CLEAR
CO2 SERPL-SCNC: 28 MMOL/L (ref 22–29)
COCAINE UR QL: NEGATIVE
COLOR UR: YELLOW
CREAT SERPL-MCNC: 1.06 MG/DL (ref 0.76–1.27)
DEPRECATED RDW RBC AUTO: 37 FL (ref 37–54)
EGFRCR SERPLBLD CKD-EPI 2021: 94.4 ML/MIN/1.73
EOSINOPHIL # BLD AUTO: 0.1 10*3/MM3 (ref 0–0.4)
EOSINOPHIL NFR BLD AUTO: 1.3 % (ref 0.3–6.2)
ERYTHROCYTE [DISTWIDTH] IN BLOOD BY AUTOMATED COUNT: 11.7 % (ref 12.3–15.4)
FENTANYL UR-MCNC: NEGATIVE NG/ML
GLOBULIN UR ELPH-MCNC: 2.1 GM/DL
GLUCOSE SERPL-MCNC: 86 MG/DL (ref 65–99)
GLUCOSE UR STRIP-MCNC: NEGATIVE MG/DL
HCT VFR BLD AUTO: 38.1 % (ref 37.5–51)
HGB BLD-MCNC: 12.4 G/DL (ref 13–17.7)
HGB UR QL STRIP.AUTO: NEGATIVE
IMM GRANULOCYTES # BLD AUTO: 0.02 10*3/MM3 (ref 0–0.05)
IMM GRANULOCYTES NFR BLD AUTO: 0.3 % (ref 0–0.5)
KETONES UR QL STRIP: ABNORMAL
L PNEUMO1 AG UR QL IA: NEGATIVE
LEUKOCYTE ESTERASE UR QL STRIP.AUTO: NEGATIVE
LYMPHOCYTES # BLD AUTO: 2.13 10*3/MM3 (ref 0.7–3.1)
LYMPHOCYTES NFR BLD AUTO: 28.2 % (ref 19.6–45.3)
MCH RBC QN AUTO: 28.3 PG (ref 26.6–33)
MCHC RBC AUTO-ENTMCNC: 32.5 G/DL (ref 31.5–35.7)
MCV RBC AUTO: 87 FL (ref 79–97)
METHADONE UR QL SCN: NEGATIVE
MONOCYTES # BLD AUTO: 0.63 10*3/MM3 (ref 0.1–0.9)
MONOCYTES NFR BLD AUTO: 8.4 % (ref 5–12)
MRSA DNA SPEC QL NAA+PROBE: NORMAL
NEUTROPHILS NFR BLD AUTO: 4.64 10*3/MM3 (ref 1.7–7)
NEUTROPHILS NFR BLD AUTO: 61.5 % (ref 42.7–76)
NITRITE UR QL STRIP: NEGATIVE
NRBC BLD AUTO-RTO: 0 /100 WBC (ref 0–0.2)
OPIATES UR QL: NEGATIVE
OXYCODONE UR QL SCN: NEGATIVE
PH UR STRIP.AUTO: 6 [PH] (ref 5–8)
PLATELET # BLD AUTO: 239 10*3/MM3 (ref 140–450)
PMV BLD AUTO: 10.8 FL (ref 6–12)
POTASSIUM SERPL-SCNC: 4.5 MMOL/L (ref 3.5–5.2)
PROCALCITONIN SERPL-MCNC: 3.42 NG/ML (ref 0–0.25)
PROT SERPL-MCNC: 5.6 G/DL (ref 6–8.5)
PROT UR QL STRIP: NEGATIVE
RBC # BLD AUTO: 4.38 10*6/MM3 (ref 4.14–5.8)
S PNEUM AG SPEC QL LA: NEGATIVE
SODIUM SERPL-SCNC: 140 MMOL/L (ref 136–145)
SP GR UR STRIP: 1.02 (ref 1–1.03)
UROBILINOGEN UR QL STRIP: ABNORMAL
WBC NRBC COR # BLD AUTO: 7.54 10*3/MM3 (ref 3.4–10.8)

## 2024-03-01 PROCEDURE — 84145 PROCALCITONIN (PCT): CPT | Performed by: INTERNAL MEDICINE

## 2024-03-01 PROCEDURE — 25010000002 ENOXAPARIN PER 10 MG: Performed by: STUDENT IN AN ORGANIZED HEALTH CARE EDUCATION/TRAINING PROGRAM

## 2024-03-01 PROCEDURE — 80307 DRUG TEST PRSMV CHEM ANLYZR: CPT | Performed by: INTERNAL MEDICINE

## 2024-03-01 PROCEDURE — 87449 NOS EACH ORGANISM AG IA: CPT | Performed by: INTERNAL MEDICINE

## 2024-03-01 PROCEDURE — G0378 HOSPITAL OBSERVATION PER HR: HCPCS

## 2024-03-01 PROCEDURE — 87641 MR-STAPH DNA AMP PROBE: CPT | Performed by: INTERNAL MEDICINE

## 2024-03-01 PROCEDURE — 96372 THER/PROPH/DIAG INJ SC/IM: CPT

## 2024-03-01 PROCEDURE — 87899 AGENT NOS ASSAY W/OPTIC: CPT | Performed by: INTERNAL MEDICINE

## 2024-03-01 PROCEDURE — 25810000003 SODIUM CHLORIDE 0.9 % SOLUTION: Performed by: INTERNAL MEDICINE

## 2024-03-01 PROCEDURE — 25010000002 PIPERACILLIN SOD-TAZOBACTAM PER 1 G: Performed by: INTERNAL MEDICINE

## 2024-03-01 PROCEDURE — 96361 HYDRATE IV INFUSION ADD-ON: CPT

## 2024-03-01 PROCEDURE — 85025 COMPLETE CBC W/AUTO DIFF WBC: CPT | Performed by: INTERNAL MEDICINE

## 2024-03-01 PROCEDURE — 80053 COMPREHEN METABOLIC PANEL: CPT | Performed by: INTERNAL MEDICINE

## 2024-03-01 PROCEDURE — 96366 THER/PROPH/DIAG IV INF ADDON: CPT

## 2024-03-01 PROCEDURE — 25010000002 HEPARIN (PORCINE) PER 1000 UNITS: Performed by: INTERNAL MEDICINE

## 2024-03-01 PROCEDURE — 81003 URINALYSIS AUTO W/O SCOPE: CPT | Performed by: INTERNAL MEDICINE

## 2024-03-01 PROCEDURE — 96365 THER/PROPH/DIAG IV INF INIT: CPT

## 2024-03-01 RX ORDER — GUAIFENESIN 600 MG/1
1200 TABLET, EXTENDED RELEASE ORAL EVERY 12 HOURS SCHEDULED
Status: DISCONTINUED | OUTPATIENT
Start: 2024-03-01 | End: 2024-03-04 | Stop reason: HOSPADM

## 2024-03-01 RX ORDER — IBUPROFEN 400 MG/1
600 TABLET ORAL EVERY 6 HOURS PRN
Status: DISPENSED | OUTPATIENT
Start: 2024-03-01 | End: 2024-03-03

## 2024-03-01 RX ORDER — ENOXAPARIN SODIUM 100 MG/ML
40 INJECTION SUBCUTANEOUS EVERY 24 HOURS
Status: DISCONTINUED | OUTPATIENT
Start: 2024-03-01 | End: 2024-03-04 | Stop reason: HOSPADM

## 2024-03-01 RX ADMIN — FOLIC ACID 1 MG: 1 TABLET ORAL at 08:35

## 2024-03-01 RX ADMIN — PIPERACILLIN SODIUM AND TAZOBACTAM SODIUM 3.38 G: 3; .375 INJECTION, SOLUTION INTRAVENOUS at 16:15

## 2024-03-01 RX ADMIN — SODIUM CHLORIDE 100 ML/HR: 9 INJECTION, SOLUTION INTRAVENOUS at 17:26

## 2024-03-01 RX ADMIN — PIPERACILLIN SODIUM AND TAZOBACTAM SODIUM 3.38 G: 3; .375 INJECTION, SOLUTION INTRAVENOUS at 08:35

## 2024-03-01 RX ADMIN — LAMOTRIGINE 200 MG: 100 TABLET ORAL at 08:35

## 2024-03-01 RX ADMIN — ENOXAPARIN SODIUM 40 MG: 100 INJECTION SUBCUTANEOUS at 21:10

## 2024-03-01 RX ADMIN — ACETAMINOPHEN 325MG 650 MG: 325 TABLET ORAL at 05:35

## 2024-03-01 RX ADMIN — IBUPROFEN 600 MG: 400 TABLET, FILM COATED ORAL at 15:08

## 2024-03-01 RX ADMIN — GUAIFENESIN 1200 MG: 600 TABLET, EXTENDED RELEASE ORAL at 21:10

## 2024-03-01 RX ADMIN — ACETAMINOPHEN 325MG 650 MG: 325 TABLET ORAL at 09:34

## 2024-03-01 RX ADMIN — GUAIFENESIN 1200 MG: 600 TABLET, EXTENDED RELEASE ORAL at 15:07

## 2024-03-01 RX ADMIN — HEPARIN SODIUM 5000 UNITS: 5000 INJECTION INTRAVENOUS; SUBCUTANEOUS at 08:36

## 2024-03-01 RX ADMIN — CLONAZEPAM 0.5 MG: 0.5 TABLET ORAL at 09:54

## 2024-03-01 RX ADMIN — Medication 10 ML: at 08:36

## 2024-03-01 RX ADMIN — Medication 10 ML: at 21:10

## 2024-03-01 RX ADMIN — PIPERACILLIN SODIUM AND TAZOBACTAM SODIUM 3.38 G: 3; .375 INJECTION, SOLUTION INTRAVENOUS at 22:24

## 2024-03-01 RX ADMIN — SODIUM CHLORIDE 100 ML/HR: 9 INJECTION, SOLUTION INTRAVENOUS at 08:37

## 2024-03-01 RX ADMIN — IBUPROFEN 600 MG: 400 TABLET, FILM COATED ORAL at 21:13

## 2024-03-01 NOTE — PROGRESS NOTES
Name: Davin HAYWOOD Winter ADMIT: 2024   : 1990  PCP: Provider, No Known    MRN: [id number] LOS: 0 days   AGE/SEX: 34 y.o. male  ROOM: [facility]     Subjective   Subjective   Feels a little better but continues to feel fatigued,     Objective   Objective   Vital Signs  Temp:  [97.3 °F (36.3 °C)-98.3 °F (36.8 °C)] 98.3 °F (36.8 °C)  Heart Rate:  [] 98  Resp:  [16-18] 16  BP: ()/(51-90) 129/89  SpO2:  [90 %-100 %] 100 %  on  Flow (L/min):  [2] 2;   Device (Oxygen Therapy): nasal cannula  Body mass index is 28.19 kg/m².  Physical Exam  Constitutional:       General: He is not in acute distress.     Appearance: He is ill-appearing.   Eyes:      Extraocular Movements: Extraocular movements intact.      Pupils: Pupils are equal, round, and reactive to light.   Cardiovascular:      Rate and Rhythm: Normal rate and regular rhythm.   Pulmonary:      Effort: Pulmonary effort is normal. No respiratory distress.      Breath sounds: No stridor. Rhonchi present.   Abdominal:      General: Abdomen is flat. There is no distension.      Palpations: Abdomen is soft.   Musculoskeletal:      Right lower leg: No edema.      Left lower leg: No edema.   Skin:     General: Skin is warm.   Neurological:      General: No focal deficit present.      Mental Status: He is alert and oriented to person, place, and time.   Psychiatric:         Mood and Affect: Mood normal.         Behavior: Behavior normal.         Results Review     I reviewed the patient's new clinical results.  Results from last 7 days   Lab Units 24  0513 24  1213   WBC 10*3/mm3 7.54 15.91*   HEMOGLOBIN g/dL 12.4* 14.1   PLATELETS 10*3/mm3 239 287     Results from last 7 days   Lab Units 24  0513 24  1213   SODIUM mmol/L 140 135*   POTASSIUM mmol/L 4.5 5.3*   CHLORIDE mmol/L 103 97*   CO2 mmol/L 28.0 26.6   BUN mg/dL 17 14   CREATININE mg/dL 1.06 1.40*   GLUCOSE mg/dL 86 81   EGFR mL/min/1.73 94.4 67.6     Results from last 7 days  "  Lab Units 03/01/24  0513 02/29/24  1213   ALBUMIN g/dL 3.5 4.0   BILIRUBIN mg/dL 0.4 0.4   ALK PHOS U/L 54 69   AST (SGOT) U/L 38 30   ALT (SGPT) U/L 26 25     Results from last 7 days   Lab Units 03/01/24  0513 02/29/24  1213   CALCIUM mg/dL 8.3* 8.7   ALBUMIN g/dL 3.5 4.0     Results from last 7 days   Lab Units 03/01/24  0513 02/29/24  1213   PROCALCITONIN ng/mL 3.42*  --    LACTATE mmol/L  --  1.8     No results found for: \"HGBA1C\", \"POCGLU\"    No radiology results for the last day  Scheduled Medications  enoxaparin, 40 mg, Subcutaneous, Q24H  folic acid, 1 mg, Oral, Daily  guaiFENesin, 1,200 mg, Oral, Q12H  lamoTRIgine, 200 mg, Oral, Daily  phenylephrine, 2 spray, Nasal, Once  piperacillin-tazobactam, 3.375 g, Intravenous, Q8H  sodium chloride, 10 mL, Intravenous, Q12H    Infusions  sodium chloride, 100 mL/hr, Last Rate: 100 mL/hr (03/01/24 1726)    Diet  Diet: Renal Diets; Low Sodium (2-3g), Low Potassium, Low Phosphorus; Texture: Regular Texture (IDDSI 7); Fluid Consistency: Thin (IDDSI 0)       Assessment/Plan     Active Hospital Problems    Diagnosis  POA    **Pneumonia involving right lung [J18.9]  Yes    LANDON (acute kidney injury) [N17.9]  Unknown    CKD (chronic kidney disease) [N18.9]  Unknown    ADHD (attention deficit hyperactivity disorder) [F90.9]  Yes    Bipolar 1 disorder [F31.9]  Yes      Resolved Hospital Problems   No resolved problems to display.       34 y.o. male admitted with Pneumonia involving right lung.      03/01/24  F/u cx, cont abx.    PNA  -CTA with no PE; consolidation right lower lobe; mediastinal and hilar lymphadenopathy; recommend repeat contrast CT in 3 months (around 6/1/2024)  -procal 3.42, WBC 15.9  -cont Zosyn    LANDON  -Crt 1.4 OA (b/l ~1)   -cont IVF    ADHD  Anxiety  -home Klonopin Adderall reflected on UDS    Bipolar disorder  -cont Lamictal    Flow (L/min):  [2] 2    DVT prophylaxis: SQ Heparin  Discussed with patient and care team on multidisciplinary " rounds.  Anticipated discharge home, 1-2 days            Leonardo Lanza MD  [facility]  03/01/24  19:13 EST

## 2024-03-01 NOTE — PLAN OF CARE
Goal Outcome Evaluation:  Plan of Care Reviewed With: patient        Progress: no change   Pt alert and oriented, c/o frequent cough and stuffy nose. IVF and IV antibiotics continue. Mucinex started today. Pt receiving Tylenol and Ibuprofen for pain. Pt slept much of today. Pt was changed from OBS to Inpatient today.

## 2024-03-01 NOTE — NURSING NOTE
[facility] re-attempted evaluation with the patient. The patient again refused and requested [facility] return tomorrow. The patient stated he would not refuse evaluation tomorrow.

## 2024-03-01 NOTE — NURSING NOTE
[facility] attempted evaluation with patient. After introducing self and role the patient stated he was very tired and requested [facility] return later.

## 2024-03-01 NOTE — PLAN OF CARE
Goal Outcome Evaluation:  Plan of Care Reviewed With: patient        Progress: improving     Patient had a calm rest all through the shift. Patient was carried along with his plan of care inclusive with his IV antibiotics therapy.No complain made this moment.

## 2024-03-02 PROCEDURE — 25010000002 PIPERACILLIN SOD-TAZOBACTAM PER 1 G: Performed by: INTERNAL MEDICINE

## 2024-03-02 PROCEDURE — 25810000003 SODIUM CHLORIDE 0.9 % SOLUTION: Performed by: INTERNAL MEDICINE

## 2024-03-02 PROCEDURE — 96361 HYDRATE IV INFUSION ADD-ON: CPT

## 2024-03-02 PROCEDURE — G0378 HOSPITAL OBSERVATION PER HR: HCPCS

## 2024-03-02 PROCEDURE — 25010000002 ENOXAPARIN PER 10 MG: Performed by: STUDENT IN AN ORGANIZED HEALTH CARE EDUCATION/TRAINING PROGRAM

## 2024-03-02 RX ADMIN — PIPERACILLIN SODIUM AND TAZOBACTAM SODIUM 3.38 G: 3; .375 INJECTION, SOLUTION INTRAVENOUS at 06:22

## 2024-03-02 RX ADMIN — PIPERACILLIN SODIUM AND TAZOBACTAM SODIUM 3.38 G: 3; .375 INJECTION, SOLUTION INTRAVENOUS at 15:00

## 2024-03-02 RX ADMIN — GUAIFENESIN 1200 MG: 600 TABLET, EXTENDED RELEASE ORAL at 08:47

## 2024-03-02 RX ADMIN — GUAIFENESIN 1200 MG: 600 TABLET, EXTENDED RELEASE ORAL at 20:13

## 2024-03-02 RX ADMIN — Medication 10 ML: at 21:43

## 2024-03-02 RX ADMIN — SODIUM CHLORIDE 100 ML/HR: 9 INJECTION, SOLUTION INTRAVENOUS at 06:22

## 2024-03-02 RX ADMIN — LAMOTRIGINE 200 MG: 100 TABLET ORAL at 08:47

## 2024-03-02 RX ADMIN — PIPERACILLIN SODIUM AND TAZOBACTAM SODIUM 3.38 G: 3; .375 INJECTION, SOLUTION INTRAVENOUS at 22:00

## 2024-03-02 RX ADMIN — Medication 10 ML: at 08:48

## 2024-03-02 RX ADMIN — SODIUM CHLORIDE 100 ML/HR: 9 INJECTION, SOLUTION INTRAVENOUS at 13:19

## 2024-03-02 RX ADMIN — FOLIC ACID 1 MG: 1 TABLET ORAL at 08:47

## 2024-03-02 NOTE — PROGRESS NOTES
Name: Davin HAYWOOD Winter ADMIT: 2024   : 1990  PCP: Provider, No Known    MRN: [id number] LOS: 1 days   AGE/SEX: 34 y.o. male  ROOM: [facility]     Subjective   Subjective   Reports feeling rather well today.  WBC decreased from 16-7.5.    Objective   Objective   Vital Signs  Temp:  [97.5 °F (36.4 °C)-98.3 °F (36.8 °C)] 97.5 °F (36.4 °C)  Heart Rate:  [] 105  Resp:  [14-16] 16  BP: (113-129)/(76-90) 118/76  SpO2:  [96 %-100 %] 100 %  on   ;   Device (Oxygen Therapy): room air  Body mass index is 28.19 kg/m².  Physical Exam  Constitutional:       General: He is not in acute distress.     Appearance: He is ill-appearing.   HENT:      Head: Normocephalic and atraumatic.   Eyes:      Extraocular Movements: Extraocular movements intact.      Pupils: Pupils are equal, round, and reactive to light.   Cardiovascular:      Rate and Rhythm: Normal rate and regular rhythm.   Pulmonary:      Effort: Pulmonary effort is normal. No respiratory distress.      Breath sounds: No stridor. Rhonchi present.   Abdominal:      General: Abdomen is flat. There is no distension.      Palpations: Abdomen is soft.   Musculoskeletal:      Right lower leg: No edema.      Left lower leg: No edema.   Skin:     General: Skin is warm.   Neurological:      General: No focal deficit present.      Mental Status: He is alert and oriented to person, place, and time.   Psychiatric:         Mood and Affect: Mood normal.         Behavior: Behavior normal.         Results Review     I reviewed the patient's new clinical results.  Results from last 7 days   Lab Units 24  0513 24  1213   WBC 10*3/mm3 7.54 15.91*   HEMOGLOBIN g/dL 12.4* 14.1   PLATELETS 10*3/mm3 239 287     Results from last 7 days   Lab Units 24  0513 24  1213   SODIUM mmol/L 140 135*   POTASSIUM mmol/L 4.5 5.3*   CHLORIDE mmol/L 103 97*   CO2 mmol/L 28.0 26.6   BUN mg/dL 17 14   CREATININE mg/dL 1.06 1.40*   GLUCOSE mg/dL 86 81   EGFR mL/min/1.73 94.4  "67.6     Results from last 7 days   Lab Units 03/01/24  0513 02/29/24  1213   ALBUMIN g/dL 3.5 4.0   BILIRUBIN mg/dL 0.4 0.4   ALK PHOS U/L 54 69   AST (SGOT) U/L 38 30   ALT (SGPT) U/L 26 25     Results from last 7 days   Lab Units 03/01/24  0513 02/29/24  1213   CALCIUM mg/dL 8.3* 8.7   ALBUMIN g/dL 3.5 4.0     Results from last 7 days   Lab Units 03/01/24  0513 02/29/24  1213   PROCALCITONIN ng/mL 3.42*  --    LACTATE mmol/L  --  1.8     No results found for: \"HGBA1C\", \"POCGLU\"    No radiology results for the last day  Scheduled Medications  enoxaparin, 40 mg, Subcutaneous, Q24H  folic acid, 1 mg, Oral, Daily  guaiFENesin, 1,200 mg, Oral, Q12H  lamoTRIgine, 200 mg, Oral, Daily  phenylephrine, 2 spray, Nasal, Once  piperacillin-tazobactam, 3.375 g, Intravenous, Q8H  sodium chloride, 10 mL, Intravenous, Q12H    Infusions  sodium chloride, 100 mL/hr, Last Rate: 100 mL/hr (03/02/24 1319)    Diet  Diet: Renal Diets; Low Sodium (2-3g), Low Potassium, Low Phosphorus; Texture: Regular Texture (IDDSI 7); Fluid Consistency: Thin (IDDSI 0)       Assessment/Plan     Active Hospital Problems    Diagnosis  POA    **Pneumonia involving right lung [J18.9]  Yes    LANDON (acute kidney injury) [N17.9]  Unknown    CKD (chronic kidney disease) [N18.9]  Unknown    ADHD (attention deficit hyperactivity disorder) [F90.9]  Yes    Bipolar 1 disorder [F31.9]  Yes      Resolved Hospital Problems   No resolved problems to display.       34 y.o. male admitted with Pneumonia involving right lung.      03/02/24  F/u cx, cont abx.    PNA  -CTA with no PE; consolidation right lower lobe; mediastinal and hilar lymphadenopathy; recommend repeat contrast CT in 3 months (around 6/1/2024)  -cont Zosyn    LANDON  -Crt 1.4 OA (b/l ~1)   -cont IVF    ADHD  Anxiety  -home Klonopin, Adderall reflected on UDS    Bipolar disorder  -cont Lamictal         DVT prophylaxis: SQ Heparin  Discussed with patient and care team on multidisciplinary rounds.  Anticipated " discharge home, 1-2 days            Mark Aragon MD  [facility]  03/02/24  14:19 EST

## 2024-03-02 NOTE — PLAN OF CARE
Goal Outcome Evaluation:  Plan of Care Reviewed With: patient        Progress: improving  Outcome Evaluation: VSS, on RA, patient A&O x4, PRN pain meds given, no nausea, IV fluids and antibiotics given, patient slept all of shift, will CTM

## 2024-03-02 NOTE — PLAN OF CARE
Goal Outcome Evaluation:  Plan of Care Reviewed With: patient        Progress: improving     Vital signs stable. Alert and oriented x 4. Denies pain or shortness of breath at present time. On room air. Normal sinus cardiac rhythm. Up ad avinash in room and to bathroom. IV fluids infusing at 100 cc / hr. IV Zosyn given as prescribed. Voiding without difficulty. Tolerating regular / renal diet fair. Call light within patient's reach. Resting quietly in no distress.

## 2024-03-03 LAB
ANION GAP SERPL CALCULATED.3IONS-SCNC: 12.4 MMOL/L (ref 5–15)
BUN SERPL-MCNC: 8 MG/DL (ref 6–20)
BUN/CREAT SERPL: 8.9 (ref 7–25)
CALCIUM SPEC-SCNC: 8.5 MG/DL (ref 8.6–10.5)
CHLORIDE SERPL-SCNC: 104 MMOL/L (ref 98–107)
CO2 SERPL-SCNC: 22.6 MMOL/L (ref 22–29)
CREAT SERPL-MCNC: 0.9 MG/DL (ref 0.76–1.27)
DEPRECATED RDW RBC AUTO: 37 FL (ref 37–54)
EGFRCR SERPLBLD CKD-EPI 2021: 114.9 ML/MIN/1.73
ERYTHROCYTE [DISTWIDTH] IN BLOOD BY AUTOMATED COUNT: 11.7 % (ref 12.3–15.4)
GLUCOSE SERPL-MCNC: 88 MG/DL (ref 65–99)
HCT VFR BLD AUTO: 40.1 % (ref 37.5–51)
HGB BLD-MCNC: 13 G/DL (ref 13–17.7)
MCH RBC QN AUTO: 28.2 PG (ref 26.6–33)
MCHC RBC AUTO-ENTMCNC: 32.4 G/DL (ref 31.5–35.7)
MCV RBC AUTO: 87 FL (ref 79–97)
PLATELET # BLD AUTO: 263 10*3/MM3 (ref 140–450)
PMV BLD AUTO: 10.5 FL (ref 6–12)
POTASSIUM SERPL-SCNC: 3.9 MMOL/L (ref 3.5–5.2)
RBC # BLD AUTO: 4.61 10*6/MM3 (ref 4.14–5.8)
SODIUM SERPL-SCNC: 139 MMOL/L (ref 136–145)
WBC NRBC COR # BLD AUTO: 5.39 10*3/MM3 (ref 3.4–10.8)

## 2024-03-03 PROCEDURE — G0378 HOSPITAL OBSERVATION PER HR: HCPCS

## 2024-03-03 PROCEDURE — 80048 BASIC METABOLIC PNL TOTAL CA: CPT | Performed by: STUDENT IN AN ORGANIZED HEALTH CARE EDUCATION/TRAINING PROGRAM

## 2024-03-03 PROCEDURE — 96361 HYDRATE IV INFUSION ADD-ON: CPT

## 2024-03-03 PROCEDURE — 25010000002 PIPERACILLIN SOD-TAZOBACTAM PER 1 G: Performed by: INTERNAL MEDICINE

## 2024-03-03 PROCEDURE — 25810000003 SODIUM CHLORIDE 0.9 % SOLUTION: Performed by: INTERNAL MEDICINE

## 2024-03-03 PROCEDURE — 85027 COMPLETE CBC AUTOMATED: CPT | Performed by: STUDENT IN AN ORGANIZED HEALTH CARE EDUCATION/TRAINING PROGRAM

## 2024-03-03 PROCEDURE — 90791 PSYCH DIAGNOSTIC EVALUATION: CPT | Performed by: MARRIAGE & FAMILY THERAPIST

## 2024-03-03 RX ADMIN — LAMOTRIGINE 200 MG: 100 TABLET ORAL at 08:42

## 2024-03-03 RX ADMIN — Medication 10 ML: at 08:43

## 2024-03-03 RX ADMIN — Medication 10 ML: at 22:19

## 2024-03-03 RX ADMIN — PIPERACILLIN SODIUM AND TAZOBACTAM SODIUM 3.38 G: 3; .375 INJECTION, SOLUTION INTRAVENOUS at 15:00

## 2024-03-03 RX ADMIN — PIPERACILLIN SODIUM AND TAZOBACTAM SODIUM 3.38 G: 3; .375 INJECTION, SOLUTION INTRAVENOUS at 22:14

## 2024-03-03 RX ADMIN — SODIUM CHLORIDE 100 ML/HR: 9 INJECTION, SOLUTION INTRAVENOUS at 08:42

## 2024-03-03 RX ADMIN — GUAIFENESIN 1200 MG: 600 TABLET, EXTENDED RELEASE ORAL at 20:23

## 2024-03-03 RX ADMIN — PIPERACILLIN SODIUM AND TAZOBACTAM SODIUM 3.38 G: 3; .375 INJECTION, SOLUTION INTRAVENOUS at 06:03

## 2024-03-03 RX ADMIN — FOLIC ACID 1 MG: 1 TABLET ORAL at 08:42

## 2024-03-03 RX ADMIN — SODIUM CHLORIDE 100 ML/HR: 9 INJECTION, SOLUTION INTRAVENOUS at 19:07

## 2024-03-03 RX ADMIN — GUAIFENESIN 1200 MG: 600 TABLET, EXTENDED RELEASE ORAL at 08:42

## 2024-03-03 NOTE — PLAN OF CARE
Goal Outcome Evaluation:  Plan of Care Reviewed With: patient        Progress: improving     Vital signs stable. Alert and oriented x 4. On room air. Normal sinus cardiac rhythm. Up ad avinash in room. IV antibiotics given as prescribed. Tolerating renal diet poorly. Poor appetite noted. Sleeping all day. Call light within patient's reach. Possible discharge tomorrow.

## 2024-03-03 NOTE — PROGRESS NOTES
Name: Davin HAYWOOD Winter ADMIT: 2024   : 1990  PCP: Provider, No Known    MRN: [id number] LOS: 2 days   AGE/SEX: 34 y.o. male  ROOM: [facility]     Subjective   Subjective   WBC normalized. No new complaints.     Objective   Objective   Vital Signs  Temp:  [97.9 °F (36.6 °C)-98.4 °F (36.9 °C)] 97.9 °F (36.6 °C)  Heart Rate:  [77-86] 77  Resp:  [16] 16  BP: (121-129)/(75-95) 125/95  SpO2:  [97 %-100 %] 97 %  on   ;   Device (Oxygen Therapy): room air  Body mass index is 28.19 kg/m².  Physical Exam  Constitutional:       General: He is not in acute distress.     Appearance: He is ill-appearing.   HENT:      Head: Normocephalic and atraumatic.   Eyes:      Extraocular Movements: Extraocular movements intact.      Pupils: Pupils are equal, round, and reactive to light.   Cardiovascular:      Rate and Rhythm: Normal rate and regular rhythm.   Pulmonary:      Effort: Pulmonary effort is normal. No respiratory distress.      Breath sounds: No stridor. Rhonchi present.   Abdominal:      General: Abdomen is flat. There is no distension.      Palpations: Abdomen is soft.   Musculoskeletal:      Right lower leg: No edema.      Left lower leg: No edema.   Skin:     General: Skin is warm.   Neurological:      General: No focal deficit present.      Mental Status: He is alert and oriented to person, place, and time.   Psychiatric:         Mood and Affect: Mood normal.         Behavior: Behavior normal.     Exam reviewed and updated on 3/3/24    Results Review     I reviewed the patient's new clinical results.  Results from last 7 days   Lab Units 24  0824 24  0513 24  1213   WBC 10*3/mm3 5.39 7.54 15.91*   HEMOGLOBIN g/dL 13.0 12.4* 14.1   PLATELETS 10*3/mm3 263 239 287     Results from last 7 days   Lab Units 24  0830 24  0513 24  1213   SODIUM mmol/L 139 140 135*   POTASSIUM mmol/L 3.9 4.5 5.3*   CHLORIDE mmol/L 104 103 97*   CO2 mmol/L 22.6 28.0 26.6   BUN mg/dL 8 17 14  "  CREATININE mg/dL 0.90 1.06 1.40*   GLUCOSE mg/dL 88 86 81   EGFR mL/min/1.73 114.9 94.4 67.6     Results from last 7 days   Lab Units 03/01/24  0513 02/29/24  1213   ALBUMIN g/dL 3.5 4.0   BILIRUBIN mg/dL 0.4 0.4   ALK PHOS U/L 54 69   AST (SGOT) U/L 38 30   ALT (SGPT) U/L 26 25     Results from last 7 days   Lab Units 03/03/24  0830 03/01/24  0513 02/29/24  1213   CALCIUM mg/dL 8.5* 8.3* 8.7   ALBUMIN g/dL  --  3.5 4.0     Results from last 7 days   Lab Units 03/01/24  0513 02/29/24  1213   PROCALCITONIN ng/mL 3.42*  --    LACTATE mmol/L  --  1.8     No results found for: \"HGBA1C\", \"POCGLU\"    No radiology results for the last day  Scheduled Medications  enoxaparin, 40 mg, Subcutaneous, Q24H  folic acid, 1 mg, Oral, Daily  guaiFENesin, 1,200 mg, Oral, Q12H  lamoTRIgine, 200 mg, Oral, Daily  phenylephrine, 2 spray, Nasal, Once  piperacillin-tazobactam, 3.375 g, Intravenous, Q8H  sodium chloride, 10 mL, Intravenous, Q12H    Infusions  sodium chloride, 100 mL/hr, Last Rate: 100 mL/hr (03/03/24 0842)    Diet  Diet: Renal Diets; Low Sodium (2-3g), Low Potassium, Low Phosphorus; Texture: Regular Texture (IDDSI 7); Fluid Consistency: Thin (IDDSI 0)       Assessment/Plan     Active Hospital Problems    Diagnosis  POA    **Pneumonia involving right lung [J18.9]  Yes    LANDON (acute kidney injury) [N17.9]  Unknown    CKD (chronic kidney disease) [N18.9]  Unknown    ADHD (attention deficit hyperactivity disorder) [F90.9]  Yes    Bipolar 1 disorder [F31.9]  Yes      Resolved Hospital Problems   No resolved problems to display.       34 y.o. male admitted with Pneumonia involving right lung.      03/03/24  F/u cx, cont abx.    PNA  -CTA with no PE; consolidation right lower lobe; mediastinal and hilar lymphadenopathy; recommend repeat contrast CT in 3 months (around 6/1/2024)  -cont Zosyn. BCX NGTD at 48 hrs      LANDON  Resolved     ADHD  Anxiety  -home Klonopin, Adderall reflected on UDS    Bipolar disorder  -cont Lamictal     "     DVT prophylaxis: SQ Heparin  Discussed with patient and care team on multidisciplinary rounds.  Anticipated discharge home, 1-2 days            Mark Aragon MD  [facility]  03/03/24  11:43 EST

## 2024-03-03 NOTE — CONSULTS
"[facility] consult.    Chart reviewed.All information per Pt report, unless otherwise noted.     Overview: Met w/Pt in room #555. Introduced self. Pt's wife was at bedside. Pt allowed wife to remain in room during interview. Explained dept & role. Pt agreed to interview. [facility] consulted for drugs. Pt was admitted from the ED after having been very sick for few days.  inally decided to be assessed when he began to hear \"a metallic kind of audio filter & that scared me.\"  He was dx w/Pneumonia. Abnormal UDS results alerted team to ask [facility] to be part of tx team.     Appearance/Presentation: Pt was unkempt, in hospital gown & kept bed covers up around his neck. His mood was tense & irritable, affect was congruent. \"([facility] are) the only ones asking me to sit up for this conversation. I am alert & I can answer your questions.\" This writer acknowledged Pt's frustration & kindly, yet directly, explained that [facility] has been patient with him re: completion of assessment and reminded him that [facility] staff had attempted to meet w/him for consult 4 previous times. Pt acknowledged this writer's statement & reluctantly cooperated. Thought content & process were WDL. However, he demonstrated poor insight into his illness, wanting his hospital care to only focus on healing his Pneumonia, without giving any credence to how not smoking substances affects his respiratory system & contributes to his illness.     Mental health hx: No current SI/HI or wish to die. Pt reported a dx of Bipolar 2, D/O, Rapid Cycling type & ADHD. Pt is prescribed Lamictal, Abilify, Adderall & Xanax/Ativan for his dxs & has been on the same med regiment for more than 5 years. His current prescriber is Psychiatrist, Dr. Tomlin at [facility]. Pt does not have a therapist.     Pt has had 5 suicide attempts in the past 3 yrs, with the most recent being \"a year ago.\" Information in Care Everywhere aligns with timing of most recent " "reported suicide attempt. Pt again became irritable about this portion of interview. \"I hate how this history follows me. I wish I could be treated like a normal person. I am here for Pneumonia & want to be treated like a normal patient.\" Again, acknowledged Pt's frustration & listened to his issue, after which I expressed to Pt that our system wants to treat the whole person, including mental health or substance use concerns as they can often be pertinent to his overall care plan. Reports sleep & appetite are WDL. With 10 being the worst, Pt rated his depression \"6 or 7 because I'm in the hospital\" and anxiety \"2\".      Substance Use Hx: Pt vapes nicotine. Pt uses approx 1/2gram - 1 gram of THC, stating \"I prefer Cannabis\" daily. Smoking is his \"preferred route of administration\" as he likes to control the process as compared to edibles. Pt has purchased Adderall pills in the past to supplement his prescription, but not recently. He previously abused Cocaine & entered a 45 day in rehab program in [place] when he was 25y/o. No other ARLENE tx hx. No other recreational substance use reported. Pt stated that he only used Heroin \"to go to sleep\". In asking for clarification, Pt stated he has used Heroin during suicide attempts.     Social Hx: Pt is a 33y/o M/W/M. He & wife have one dtr, aged 4. They have live with spouse's parents for the past 5 months. Pt is a  & has no hobbies that aren't related to his job. He has some college education. He has no Spiritism or spiritual affiliation. His support system is his wife & a couple friends.     Plan: Pt has no interest in substance use tx & no interest in discontinuing use of Cannabis. He did say he \"could use a therapist.\" [facility] will return later this day to provide said resources. Did gently challenge Pt to see the big picture of his medical care, particularly how his choices to inhale foreign substances affects his respiratory system & overall health. Pt " verbalized understanding and had no questions of this writer.     Informed RN of [facility] consult being completed. [facility] to follow briefly.

## 2024-03-03 NOTE — PROGRESS NOTES
[facility] consult d/t drugs; this writer reviewed chart, spoke with OCTAVIO Mejia, and attempted to meet with pt. This clinician called pt's name several times with no response; pt is laying in bed with eye closed; this writer updated RN. [facility] will attempt consult again tomorrow; no further needs and/or concerns noted at this time per RN and/or medical team.

## 2024-03-03 NOTE — PLAN OF CARE
"  Problem: Adult Inpatient Plan of Care  Goal: Plan of Care Review  Outcome: Ongoing, Not Progressing  Flowsheets (Taken 3/3/2024 0451)  Outcome Evaluation: VSS. AO x 4. On RA. NO c/o of pain or nausea. Abx and IVF given. Patient states 'I am very tired I just want to sleep.\" Pt slept most of shift.  Goal: Patient-Specific Goal (Individualized)  Outcome: Ongoing, Not Progressing  Goal: Absence of Hospital-Acquired Illness or Injury  Outcome: Ongoing, Not Progressing  Intervention: Identify and Manage Fall Risk  Recent Flowsheet Documentation  Taken 3/3/2024 0436 by Jackie Morales, RN  Safety Promotion/Fall Prevention:   activity supervised   assistive device/personal items within reach   clutter free environment maintained   lighting adjusted   mobility aid in reach   nonskid shoes/slippers when out of bed   safety round/check completed   toileting scheduled  Taken 3/3/2024 0203 by Jackie Morales, RN  Safety Promotion/Fall Prevention:   activity supervised   assistive device/personal items within reach   clutter free environment maintained   lighting adjusted   mobility aid in reach   nonskid shoes/slippers when out of bed   safety round/check completed   toileting scheduled  Taken 3/3/2024 0012 by Jackie Morales, RN  Safety Promotion/Fall Prevention:   activity supervised   assistive device/personal items within reach   clutter free environment maintained   lighting adjusted   mobility aid in reach   nonskid shoes/slippers when out of bed   safety round/check completed   toileting scheduled  Taken 3/2/2024 2237 by Jackie Morales, RN  Safety Promotion/Fall Prevention:   activity supervised   assistive device/personal items within reach   clutter free environment maintained   lighting adjusted   mobility aid in reach   nonskid shoes/slippers when out of bed  Taken 3/2/2024 2020 by Jackie Morales, RN  Safety Promotion/Fall Prevention:   activity supervised   assistive device/personal " "items within reach   clutter free environment maintained   lighting adjusted   mobility aid in reach   nonskid shoes/slippers when out of bed   safety round/check completed   toileting scheduled  Intervention: Prevent and Manage VTE (Venous Thromboembolism) Risk  Recent Flowsheet Documentation  Taken 3/3/2024 0436 by Jackie Morales, RN  Activity Management: activity encouraged  Taken 3/3/2024 0203 by Jackie Morales, RN  Activity Management: activity encouraged  Taken 3/3/2024 0012 by Jackie Morales, RN  Activity Management: activity encouraged  Taken 3/2/2024 2237 by Jackie Morales, RN  Activity Management: activity encouraged  Taken 3/2/2024 2020 by Jackie Morales, RN  Activity Management: activity encouraged  VTE Prevention/Management: patient refused intervention  Goal: Optimal Comfort and Wellbeing  Outcome: Ongoing, Not Progressing  Intervention: Provide Person-Centered Care  Recent Flowsheet Documentation  Taken 3/2/2024 2020 by Jackie Morales, RN  Trust Relationship/Rapport:   care explained   choices provided   emotional support provided   questions answered   questions encouraged   reassurance provided   thoughts/feelings acknowledged  Goal: Readiness for Transition of Care  Outcome: Ongoing, Not Progressing   Goal Outcome Evaluation:              Outcome Evaluation: VSS. AO x 4. On RA. NO c/o of pain or nausea. Abx and IVF given. Patient states 'I am very tired I just want to sleep.\" Pt slept most of shift.                               "

## 2024-03-04 ENCOUNTER — READMISSION MANAGEMENT (OUTPATIENT)
Dept: CALL CENTER | Facility: HOSPITAL | Age: 34
End: 2024-03-04
Payer: MEDICAID

## 2024-03-04 VITALS
HEART RATE: 75 BPM | TEMPERATURE: 98.1 F | DIASTOLIC BLOOD PRESSURE: 87 MMHG | BODY MASS INDEX: 28.25 KG/M2 | OXYGEN SATURATION: 97 % | SYSTOLIC BLOOD PRESSURE: 119 MMHG | RESPIRATION RATE: 14 BRPM | WEIGHT: 180 LBS | HEIGHT: 67 IN

## 2024-03-04 LAB
ANION GAP SERPL CALCULATED.3IONS-SCNC: 10 MMOL/L (ref 5–15)
BUN SERPL-MCNC: 10 MG/DL (ref 6–20)
BUN/CREAT SERPL: 10.9 (ref 7–25)
CALCIUM SPEC-SCNC: 8.8 MG/DL (ref 8.6–10.5)
CHLORIDE SERPL-SCNC: 107 MMOL/L (ref 98–107)
CO2 SERPL-SCNC: 25 MMOL/L (ref 22–29)
CREAT SERPL-MCNC: 0.92 MG/DL (ref 0.76–1.27)
DEPRECATED RDW RBC AUTO: 41.1 FL (ref 37–54)
EGFRCR SERPLBLD CKD-EPI 2021: 111.9 ML/MIN/1.73
ERYTHROCYTE [DISTWIDTH] IN BLOOD BY AUTOMATED COUNT: 12.4 % (ref 12.3–15.4)
GLUCOSE SERPL-MCNC: 93 MG/DL (ref 65–99)
HCT VFR BLD AUTO: 41.7 % (ref 37.5–51)
HGB BLD-MCNC: 13.6 G/DL (ref 13–17.7)
MCH RBC QN AUTO: 29.1 PG (ref 26.6–33)
MCHC RBC AUTO-ENTMCNC: 32.6 G/DL (ref 31.5–35.7)
MCV RBC AUTO: 89.3 FL (ref 79–97)
PLATELET # BLD AUTO: 271 10*3/MM3 (ref 140–450)
PMV BLD AUTO: 10.2 FL (ref 6–12)
POTASSIUM SERPL-SCNC: 3.8 MMOL/L (ref 3.5–5.2)
RBC # BLD AUTO: 4.67 10*6/MM3 (ref 4.14–5.8)
SODIUM SERPL-SCNC: 142 MMOL/L (ref 136–145)
WBC NRBC COR # BLD AUTO: 6.38 10*3/MM3 (ref 3.4–10.8)

## 2024-03-04 PROCEDURE — 25010000002 PIPERACILLIN SOD-TAZOBACTAM PER 1 G: Performed by: INTERNAL MEDICINE

## 2024-03-04 PROCEDURE — 96361 HYDRATE IV INFUSION ADD-ON: CPT

## 2024-03-04 PROCEDURE — 85027 COMPLETE CBC AUTOMATED: CPT | Performed by: STUDENT IN AN ORGANIZED HEALTH CARE EDUCATION/TRAINING PROGRAM

## 2024-03-04 PROCEDURE — G0378 HOSPITAL OBSERVATION PER HR: HCPCS

## 2024-03-04 PROCEDURE — 80048 BASIC METABOLIC PNL TOTAL CA: CPT | Performed by: STUDENT IN AN ORGANIZED HEALTH CARE EDUCATION/TRAINING PROGRAM

## 2024-03-04 PROCEDURE — 25810000003 SODIUM CHLORIDE 0.9 % SOLUTION: Performed by: INTERNAL MEDICINE

## 2024-03-04 RX ADMIN — GUAIFENESIN 1200 MG: 600 TABLET, EXTENDED RELEASE ORAL at 08:36

## 2024-03-04 RX ADMIN — LAMOTRIGINE 200 MG: 100 TABLET ORAL at 08:36

## 2024-03-04 RX ADMIN — SODIUM CHLORIDE 100 ML/HR: 9 INJECTION, SOLUTION INTRAVENOUS at 06:05

## 2024-03-04 RX ADMIN — PIPERACILLIN SODIUM AND TAZOBACTAM SODIUM 3.38 G: 3; .375 INJECTION, SOLUTION INTRAVENOUS at 06:05

## 2024-03-04 RX ADMIN — FOLIC ACID 1 MG: 1 TABLET ORAL at 08:36

## 2024-03-04 NOTE — PROGRESS NOTES
"[facility] did follow up with pt. Pt sleeping but wakes briefly to respond to some questions. Pt has outpt therapist referrals given to him at his request yesterday. Pt states he is feeling \"fine\". [facility] will continue to follow.  "

## 2024-03-04 NOTE — PLAN OF CARE
Problem: Adult Inpatient Plan of Care  Goal: Plan of Care Review  Outcome: Ongoing, Not Progressing  Flowsheets (Taken 3/4/2024 0455)  Outcome Evaluation: VSS. AO x 4. On RA. Pt sleeping between care. Abx and IVF given.  Goal: Patient-Specific Goal (Individualized)  Outcome: Ongoing, Not Progressing  Goal: Absence of Hospital-Acquired Illness or Injury  Outcome: Ongoing, Not Progressing  Intervention: Identify and Manage Fall Risk  Recent Flowsheet Documentation  Taken 3/4/2024 0432 by Jackie Morales RN  Safety Promotion/Fall Prevention:   activity supervised   assistive device/personal items within reach   clutter free environment maintained   lighting adjusted   mobility aid in reach   nonskid shoes/slippers when out of bed   safety round/check completed   toileting scheduled  Taken 3/4/2024 0230 by Jackie Morales, RN  Safety Promotion/Fall Prevention:   activity supervised   assistive device/personal items within reach   clutter free environment maintained   lighting adjusted   mobility aid in reach   nonskid shoes/slippers when out of bed   safety round/check completed   toileting scheduled  Taken 3/4/2024 0007 by Jackie Morales, RN  Safety Promotion/Fall Prevention:   activity supervised   clutter free environment maintained   assistive device/personal items within reach   lighting adjusted   mobility aid in reach   nonskid shoes/slippers when out of bed   toileting scheduled   safety round/check completed  Taken 3/3/2024 2221 by Jackie Morales, RN  Safety Promotion/Fall Prevention:   activity supervised   assistive device/personal items within reach   clutter free environment maintained   mobility aid in reach   lighting adjusted   nonskid shoes/slippers when out of bed   toileting scheduled   safety round/check completed  Taken 3/3/2024 2030 by Jackie Morales, RN  Safety Promotion/Fall Prevention:   activity supervised   assistive device/personal items within reach   clutter  free environment maintained   lighting adjusted   mobility aid in reach   nonskid shoes/slippers when out of bed   safety round/check completed   toileting scheduled  Intervention: Prevent and Manage VTE (Venous Thromboembolism) Risk  Recent Flowsheet Documentation  Taken 3/4/2024 0432 by Jackie Morales, RN  Activity Management: up ad avinash  Taken 3/4/2024 0230 by Jackie Morales, RN  Activity Management: up ad avinash  Taken 3/4/2024 0007 by Jackie Morales, RN  Activity Management: up ad avinash  Taken 3/3/2024 2221 by Jackie Morales, RN  Activity Management: up ad avinash  Taken 3/3/2024 2030 by Jackie Morales, RN  Activity Management: up ad avinash  VTE Prevention/Management: (pt refused Lovenox) patient refused intervention  Goal: Optimal Comfort and Wellbeing  Outcome: Ongoing, Not Progressing  Intervention: Provide Person-Centered Care  Recent Flowsheet Documentation  Taken 3/3/2024 2030 by Jackie Morales, RN  Trust Relationship/Rapport:   care explained   choices provided   emotional support provided   questions answered   questions encouraged   reassurance provided   thoughts/feelings acknowledged  Goal: Readiness for Transition of Care  Outcome: Ongoing, Not Progressing   Goal Outcome Evaluation:              Outcome Evaluation: VSS. AO x 4. On RA. Pt sleeping between care. Abx and IVF given.

## 2024-03-05 LAB
BACTERIA SPEC AEROBE CULT: NORMAL
BACTERIA SPEC AEROBE CULT: NORMAL

## 2024-03-05 NOTE — OUTREACH NOTE
Prep Survey      Flowsheet Row Responses   [facility] facility patient discharged from? [place]   Is LACE score < 7 ? No   Eligibility Readm Mgmt   Discharge diagnosis Pneumonia involving right lung   Does the patient have one of the following disease processes/diagnoses(primary or secondary)? Pneumonia   Does the patient have Home health ordered? No   Is there a DME ordered? No   Prep survey completed? Yes            Dominique FUNG - Registered Nurse

## 2024-03-05 NOTE — DISCHARGE SUMMARY
Patient Name: Davin Booker  : 1990  MRN: [id number]    Date of Admission: 2024  Date of Discharge:  3/5/2024  Primary Care Physician: Provider, No Known      Chief Complaint:   Hearing Loss      Discharge Diagnoses     Active Hospital Problems    Diagnosis  POA    **Pneumonia involving right lung [J18.9]  Yes    LANDON (acute kidney injury) [N17.9]  Unknown    CKD (chronic kidney disease) [N18.9]  Unknown    ADHD (attention deficit hyperactivity disorder) [F90.9]  Yes    Bipolar 1 disorder [F31.9]  Yes      Resolved Hospital Problems   No resolved problems to display.        Hospital Course       34-year-old male with bipolar disorder presented to hospital with cough and congestion, admitted for pneumonia.  He was started on IV antibiotics.  CT scan showed a consolidation in the right lower lobe along with mediastinal and hilar adenopathy.  Started on Zosyn and transition to Augmentin on discharge to complete a 7-day course of antibiotics.  He will need a follow-up CT scan in 3 months, around 2024 to document resolution of the right lower lobe infiltrate.    Physical Exam:     Body mass index is 28.19 kg/m².  Physical Exam  General: Alert and oriented x3, no acute distress.  HEENT: Normocephalic, atraumatic  Eyes: PERRL, EOMI, anicteric sclera  Lungs: Clear to auscultation bilaterally, no crackles or wheezes  CV: Regular rate and rhythm, no murmurs rubs or gallops  Abdomen: Soft, nontender, nondistended.  Normoactive bowel sounds  Extremities: No significant peripheral edema  Skin: Clean/dry/intact, no rashes  Neuro: Cranial nerves II through XII intact, no gross focal neurological deficits appreciated  Psych: Appropriate mood and affect    Consultants     Consult Orders (all) (From admission, onward)       Start     Ordered    24 0655  [facility] Consult  Once        Provider:  (Not yet assigned)    24 0654    24 1423  [facility] (on-call MD unless specified) Details   Once,   Status:  Canceled        Specialty:  Hospitalist  Provider:  Yaa Arnold MD    02/29/24 1422                  Procedures     Imaging Results (All)       Procedure Component Value Units Date/Time    CT Angiogram Chest Pulmonary Embolism [[id number]] Collected: 02/29/24 1643     Updated: 03/01/24 1328    Narrative:      CT ANGIOGRAM OF THE CHEST. MULTIPLE CORONAL, SAGITTAL, AND 3-D  RECONSTRUCTIONS.     HISTORY: 34-year-old male with cough and upper respiratory symptoms.     TECHNIQUE: Radiation dose reduction techniques were utilized, including  automated exposure control and exposure modulation based on body size.   CT angiogram of the chest was performed following the administration of  IV contrast. Multiple coronal, sagittal, and 3-D reconstruction images  were obtained. Compared with today's chest radiograph.     FINDINGS:  1. There is no convincing evidence for pulmonary thromboemboli.     2. There is a sizable mixed density airspace consolidation at the  lateral and posterior aspects of the right lower lobe which likely  represents pneumonia. There is a much less prominent but sizable  similar-appearing airspace opacity at the lateral and posterior aspect  of the right upper lobe. Both likely represent pneumonia. Follow-up to  complete resolution is recommended.     There are no pleural or pericardial effusions. The ill-defined  mediastinal and hilar nodes are likely reactive. REEVALUATION IS  RECOMMENDED WITH A CONTRAST-ENHANCED CHEST CT IN 3 MONTHS.     3. At the visualized upper abdomen, the stomach is distended and is  nearly filled with fluid. The duodenum is nearly collapsed. The patient  may be postprandial, but please correlate clinically for delayed gastric  emptying or possible gastric outlet obstruction.     This report was finalized on 3/1/2024 1:25 PM by Dr. Maddie Morgan M.D on  Workstation: [id number]       XR Chest 1 View [[id number]] Collected: 02/29/24 1237     Updated: 02/29/24 1241     "Narrative:      XR CHEST 1 VW-     Clinical: Cough     FINDINGS: The left lung is clear. Heart size within normal limits. No  mediastinal or hilar abnormality. There is vague infiltrate demonstrated  within the right mid and lower lung zone. No effusion or edema seen. The  remainder is unremarkable.     This report was finalized on 2/29/2024 12:38 PM by Dr. Wale Yanes M.D on Workstation: [id number]               Pertinent Labs     Results from last 7 days   Lab Units 03/04/24  0507 03/03/24 0824 03/01/24 0513 02/29/24  1213   WBC 10*3/mm3 6.38 5.39 7.54 15.91*   HEMOGLOBIN g/dL 13.6 13.0 12.4* 14.1   PLATELETS 10*3/mm3 271 263 239 287     Results from last 7 days   Lab Units 03/04/24 0507 03/03/24 0830 03/01/24 0513 02/29/24  1213   SODIUM mmol/L 142 139 140 135*   POTASSIUM mmol/L 3.8 3.9 4.5 5.3*   CHLORIDE mmol/L 107 104 103 97*   CO2 mmol/L 25.0 22.6 28.0 26.6   BUN mg/dL 10 8 17 14   CREATININE mg/dL 0.92 0.90 1.06 1.40*   GLUCOSE mg/dL 93 88 86 81   Estimated Creatinine Clearance: 115.7 mL/min (by C-G formula based on SCr of 0.92 mg/dL).  Results from last 7 days   Lab Units 03/01/24  0513 02/29/24  1213   ALBUMIN g/dL 3.5 4.0   BILIRUBIN mg/dL 0.4 0.4   ALK PHOS U/L 54 69   AST (SGOT) U/L 38 30   ALT (SGPT) U/L 26 25     Results from last 7 days   Lab Units 03/04/24  0507 03/03/24 0830 03/01/24 0513 02/29/24  1213   CALCIUM mg/dL 8.8 8.5* 8.3* 8.7   ALBUMIN g/dL  --   --  3.5 4.0               Invalid input(s): \"LDLCALC\"  Results from last 7 days   Lab Units 02/29/24  1500   BLOODCX  No growth at 5 days  No growth at 5 days       Test Results Pending at Discharge       Discharge Details        Discharge Medications        New Medications        Instructions Start Date   amoxicillin-clavulanate 875-125 MG per tablet  Commonly known as: AUGMENTIN   Take 1 tablet by mouth 2 (two) times a day.      azithromycin 250 MG tablet  Commonly known as: ZITHROMAX   Take 1 tablet by mouth daily for 4 days.    "   benzonatate 100 MG capsule  Commonly known as: TESSALON   100 mg, Oral, 3 Times Daily PRN             Continue These Medications        Instructions Start Date   Adderall XR 30 MG 24 hr capsule  Generic drug: amphetamine-dextroamphetamine XR   30 mg, Oral, 2 Times Daily      ARIPiprazole 2 MG tablet  Commonly known as: ABILIFY   2 mg, Oral, Daily      clonazePAM 1 MG tablet  Commonly known as: KlonoPIN       folic acid 1 MG tablet  Commonly known as: FOLVITE   1 mg, Oral, Daily      lamoTRIgine 100 MG tablet  Commonly known as: LaMICtal   200 mg, Oral, Daily               No Known Allergies      Discharge Disposition:  Home or Self Care    Discharge Diet:  No active diet order      Discharge Activity:       CODE STATUS:    Code Status and Medical Interventions:   Ordered at: 02/29/24 5252     Code Status (Patient has no pulse and is not breathing):    CPR (Attempt to Resuscitate)     Medical Interventions (Patient has pulse or is breathing):    Full Support       No future appointments.   Follow-up Information       PATIENT [facility]. Call in 1 day.    Why: Establish primary care and close outpatient follow-up  Contact information:  [facility] [address]  [phone]             [facility] ALCOHOL AND DRUG ABUSE. Schedule an appointment as soon as possible for a visit .    Why: As needed  Contact information:  [address] [facility] [address] [phone]             Provider, No Known .    Contact information:  [facility] [id number] [phone]                             Time Spent on Discharge:  Greater than 30 minutes      Mark Aragon MD  [facility]  03/05/24  16:59 EST

## 2024-03-05 NOTE — CASE MANAGEMENT/SOCIAL WORK
Case Management Discharge Note      Final Note: Discharged home         Selected Continued Care - Discharged on 3/4/2024 Admission date: 2/29/2024 - Discharge disposition: Home or Self Care      Destination    No services have been selected for the patient.                Durable Medical Equipment    No services have been selected for the patient.                Dialysis/Infusion    No services have been selected for the patient.                Home Medical Care    No services have been selected for the patient.                Therapy    No services have been selected for the patient.                Community Resources    No services have been selected for the patient.                Community & DME    No services have been selected for the patient.                    Transportation Services  Private: Car    Final Discharge Disposition Code: 01 - home or self-care

## 2024-03-08 ENCOUNTER — READMISSION MANAGEMENT (OUTPATIENT)
Dept: CALL CENTER | Facility: HOSPITAL | Age: 34
End: 2024-03-08
Payer: MEDICAID

## 2024-03-08 NOTE — OUTREACH NOTE
COPD/PN Week 1 Survey      Flowsheet Row Responses   [facility] facility patient discharged from? [place]   Does the patient have one of the following disease processes/diagnoses(primary or secondary)? Pneumonia   Week 1 attempt successful? No   Unsuccessful attempts Attempt 1            Kay Seth Registered Nurse

## 2024-03-12 ENCOUNTER — HOSPITAL ENCOUNTER (EMERGENCY)
Facility: HOSPITAL | Age: 34
Discharge: HOME | End: 2024-03-12
Attending: EMERGENCY MEDICINE | Admitting: EMERGENCY MEDICINE
Payer: MEDICAID

## 2024-03-12 VITALS
OXYGEN SATURATION: 97 % | DIASTOLIC BLOOD PRESSURE: 76 MMHG | HEART RATE: 111 BPM | SYSTOLIC BLOOD PRESSURE: 135 MMHG | HEIGHT: 67 IN | BODY MASS INDEX: 28.25 KG/M2 | RESPIRATION RATE: 16 BRPM | WEIGHT: 180 LBS | TEMPERATURE: 97.2 F

## 2024-03-12 DIAGNOSIS — T50.901A ACUTE DRUG OVERDOSE, ACCIDENTAL OR UNINTENTIONAL, INITIAL ENCOUNTER: Primary | ICD-10-CM

## 2024-03-12 PROCEDURE — 99283 EMERGENCY DEPT VISIT LOW MDM: CPT

## 2024-03-12 NOTE — ED PROVIDER NOTES
EMERGENCY DEPARTMENT ENCOUNTER  Room Number:  02/02  PCP: Provider, No Known  Independent Historians: Patient      HPI:  Chief Complaint: had concerns including Drug Overdose.     A complete HPI/ROS/PMH/PSH/SH/FH are unobtainable due to: None    Chronic or social conditions impacting patient care (Social Determinants of Health): None      Context: Davin Booker is a 34 y.o. male with a medical history of ADHD, anxiety, bipolar disorder presents emergency department after an apparent overdose.  Patient says he took what he thought to be 4 mg of clonazepam that he bought on the Internet but apparently was unresponsive tonight and CPR was given.  It is unclear if the patient ever actually lost a pulse.  He says he does not remember getting CPR.  He was given 12 mg of Narcan prehospital via EMS.  Patient says now he just feels tired.  He says he has had intentional opiate overdoses in the past but denies using opiates regularly.  He says he had no intention of using opiates tonight and is not sure exactly what he took.  Patient says he was not intending to hurt himself or end his life tonight.  He denies chest pain or shortness of breath.  He denies abdominal pain, nausea, or vomiting.      Review of prior external notes (non-ED) -and- Review of prior external test results outside of this encounter:   Patient was admitted to the hospital here on 2/29/2024 to 3/4/2024 for hypoxic respiratory failure and pneumonia.  He was treated with Zosyn with transition to Augmentin.    Patient was admitted to the hospital on 3/28/2023 after a suicide attempt.  He was brought in for intentional overdose of Xanax, heroin, and amphetamines.  Patient had rhabdo and aspiration pneumonia was treated with IV antibiotics and IV fluids.  He was followed by psychiatry.    PAST MEDICAL HISTORY  Active Ambulatory Problems     Diagnosis Date Noted    ADHD (attention deficit hyperactivity disorder)     Bipolar 1 disorder     Pneumonia  involving right lung 02/29/2024    LANDON (acute kidney injury) 02/29/2024    CKD (chronic kidney disease) 02/29/2024     Resolved Ambulatory Problems     Diagnosis Date Noted    No Resolved Ambulatory Problems     No Additional Past Medical History         PAST SURGICAL HISTORY  Past Surgical History:   Procedure Laterality Date    TONSILLECTOMY           FAMILY HISTORY  Family History   Problem Relation Age of Onset    No Known Problems Mother     No Known Problems Father          SOCIAL HISTORY  Social History     Socioeconomic History    Marital status:    Tobacco Use    Smoking status: Former    Smokeless tobacco: Never    Tobacco comments:     2019   Vaping Use    Vaping status: Every Day    Substances: Nicotine, THC   Substance and Sexual Activity    Alcohol use: Yes     Comment: occasional    Drug use: Yes     Types: Marijuana, Cocaine(coke)     Comment: heroin    Sexual activity: Defer         ALLERGIES  Patient has no known allergies.      REVIEW OF SYSTEMS  Included in HPI  All systems reviewed and negative except for those discussed in HPI.      PHYSICAL EXAM    I have reviewed the triage vital signs and nursing notes.    ED Triage Vitals [03/12/24 0301]   Temp Heart Rate Resp BP SpO2   97.2 °F (36.2 °C) 112 14 143/81 100 %      Temp src Heart Rate Source Patient Position BP Location FiO2 (%)   -- Monitor -- -- --       Physical Exam  Constitutional:       Appearance: Normal appearance.      Comments: Arouses to name   HENT:      Head: Normocephalic and atraumatic.      Mouth/Throat:      Mouth: Mucous membranes are moist.   Eyes:      Extraocular Movements: Extraocular movements intact.      Pupils: Pupils are equal, round, and reactive to light.   Cardiovascular:      Rate and Rhythm: Normal rate and regular rhythm.      Pulses: Normal pulses.      Heart sounds: Normal heart sounds.   Pulmonary:      Effort: Pulmonary effort is normal. No respiratory distress.      Breath sounds: Normal breath  sounds. No wheezing, rhonchi or rales.   Abdominal:      General: Abdomen is flat. There is no distension.      Palpations: Abdomen is soft.      Tenderness: There is no abdominal tenderness.   Musculoskeletal:         General: Normal range of motion.      Cervical back: Normal range of motion and neck supple.   Skin:     General: Skin is warm and dry.      Capillary Refill: Capillary refill takes less than 2 seconds.   Neurological:      General: No focal deficit present.      Mental Status: He is alert and oriented to person, place, and time.   Psychiatric:         Mood and Affect: Mood normal.         Behavior: Behavior normal.           LAB RESULTS  No results found for this or any previous visit (from the past 24 hour(s)).      RADIOLOGY  No Radiology Exams Resulted Within Past 24 Hours      MEDICATIONS GIVEN IN ER  Medications - No data to display        OUTPATIENT MEDICATION MANAGEMENT:  No current Epic-ordered facility-administered medications on file.     Current Outpatient Medications Ordered in Epic   Medication Sig Dispense Refill    amoxicillin-clavulanate (AUGMENTIN) 875-125 MG per tablet Take 1 tablet by mouth 2 (two) times a day. 20 tablet 0    amphetamine-dextroamphetamine XR (Adderall XR) 30 MG 24 hr capsule Take 1 capsule by mouth 2 (Two) Times a Day      ARIPiprazole (ABILIFY) 2 MG tablet Take 1 tablet by mouth Daily.      azithromycin (ZITHROMAX) 250 MG tablet Take 1 tablet by mouth daily for 4 days. 4 tablet 0    benzonatate (TESSALON) 100 MG capsule Take 1 capsule by mouth 3 (Three) Times a Day As Needed for Cough. 30 capsule 0    clonazePAM (KlonoPIN) 1 MG tablet       folic acid (FOLVITE) 1 MG tablet Take 1 tablet by mouth Daily.      lamoTRIgine (LaMICtal) 100 MG tablet Take 2 tablets by mouth Daily.                 PROGRESS, DATA ANALYSIS, CONSULTS, AND MEDICAL DECISION MAKING  Differential diagnosis includes but is not limited to:   Opiate overdose, benzo overdose, unknown substance  overdose    ED Course:  ED Course as of 03/12/24 2101   Tue Mar 12, 2024   8165 I discussed the case with Dr. Laurent and they agree to evaluate the patient at the bedside.    [CC]   5812 Patient has been having some good period of time.  He is clinically sober.  Patient is calling a sober ride to come pick him up from the ED.  All discharge instructions were discussed. [CC]      ED Course User Index  [CC] Patricia Verma PA-C           AS OF 21:01 EDT VITALS:    BP - 135/76  HR - 111  TEMP - 97.2 °F (36.2 °C)  O2 SATS - 97%      MDM:  Patient is a generally healthy 34-year-old male who presents emergency department today after an overdose on his unknown substance.  On arrival here in the emergency department he is tachycardic but vitals otherwise reassuring.  He is not hypoxic and not requiring supplemental oxygen.  On my exam the patient is drowsy but arouses to voice and answers all questions appropriately.  He admits to taking what he thought to be clonazepam.  As the patient was given a large dose of Narcan prehospital he was observed here in the emergency department for 3 hours with no recurrence of symptoms.  He never required supplemental oxygen.  Patient is awake, alert, oriented, tolerating p.o., and ambulatory here in the emergency department and is clinically sober.  He called a sober ride home and will be discharged at this time.        DIAGNOSIS  Final diagnoses:   Acute drug overdose, accidental or unintentional, initial encounter         DISPOSITION  ED Disposition       ED Disposition   Discharge    Condition   Stable    Comment   --                      Please note that portions of this document were completed with a voice recognition program.    Note Disclaimer: At [facility], we believe that sharing information builds trust and better relationships. You are receiving this note because you recently visited [facility]. It is possible you will see health information before a provider has  talked with you about it. This kind of information can be easy to misunderstand. To help you fully understand what it means for your health, we urge you to discuss this note with your provider.     Patricia Verma PA-C  03/12/24 2102

## 2024-03-12 NOTE — ED PROVIDER NOTES
MD ATTESTATION NOTE    The MICHELLE and I have discussed this patient's history, physical exam, and treatment plan.  I have reviewed the documentation and personally had a face to face interaction with the patient. I affirm the documentation and agree with the treatment and plan.  The attached note describes my personal findings.      I provided a substantive portion of the care of the patient.  I personally performed the physical exam in its entirety, and below are my findings.        Brief HPI: This patient is a 34-year-old male with a history of anxiety and bipolar disorder presenting to the emergency room today with an accidental overdose.  He reports that he thought he was taking clonazepam but is unsure exactly what it was.  EMS found the patient unresponsive and was given a total of 12 mg of Narcan with improvement in the patient's mental status.  He currently denies any physical complaints.      PHYSICAL EXAM  ED Triage Vitals [03/12/24 0301]   Temp Heart Rate Resp BP SpO2   97.2 °F (36.2 °C) 112 14 143/81 100 %      Temp src Heart Rate Source Patient Position BP Location FiO2 (%)   -- Monitor -- -- --         GENERAL: Resting comfortably and in no acute distress, nontoxic in appearance  HENT: nares patent  EYES: no scleral icterus  CV: regular rhythm, normal rate, no M/R/G  RESPIRATORY: normal effort, lungs clear bilaterally  ABDOMEN: soft, nontender, no rebound or guarding  MUSCULOSKELETAL: no deformity, no edema  NEURO: alert, moves all extremities, follows commands  PSYCH:  calm, cooperative  SKIN: warm, dry    Vital signs and nursing notes reviewed.        Plan: We will monitor the patient's cardiovascular and respiratory status closely in the emergency room as we await the opiate effect to wear down.  We will retreat with naloxone if need be but will monitor closely.       Maco Laurent MD  03/12/24 9530

## 2024-03-12 NOTE — DISCHARGE INSTRUCTIONS
Please follow-up with your PCP.      Although you are being discharged in the ED today, I encouraged her to return for worsening symptoms.  Things can, and do, change such a treatment at home with medication may not be adequate.  Specifically I recommend returning for chest pain or discomfort, difficulty breathing, persistent vomiting or difficulty holding down liquids or medications, fever > 102.0 F,  or any other worsening or alarming symptoms.     You have been evaluated in the emergency department for your presenting symptoms and deemed appropriate for discharge home.  Understand that your health care does not end here today.  It is important that your primary care physician be made aware of your visit.  I recommend calling your primary care provider in the next 48 hours to arrange follow-up care.  Your primary care provider needs to review your treatment and recovery to ensure that no further treatment is necessary.  Additional testing or procedures may be necessary as an outpatient at the discretion of your primary care provider.    I also recommend following up with your PCP for recheck of your blood pressure and treatment as needed.

## 2024-03-14 ENCOUNTER — READMISSION MANAGEMENT (OUTPATIENT)
Dept: CALL CENTER | Facility: HOSPITAL | Age: 34
End: 2024-03-14
Payer: MEDICAID

## 2024-03-14 NOTE — OUTREACH NOTE
COPD/PN Week 1 Survey      Flowsheet Row Responses   [facility] patient discharged from? [facility]   Does the patient have one of the following disease processes/diagnoses(primary or secondary)? Pneumonia   Week 1 attempt successful? No   Unsuccessful attempts Attempt 2  [UTR both contacts]            Trupti FUNG - Registered Nurse

## 2024-03-18 ENCOUNTER — READMISSION MANAGEMENT (OUTPATIENT)
Dept: CALL CENTER | Facility: HOSPITAL | Age: 34
End: 2024-03-18
Payer: MEDICAID

## 2024-03-18 NOTE — OUTREACH NOTE
COPD/PN Week 1 Survey      Flowsheet Row Responses   [facility] patient discharged from? [place]   Does the patient have one of the following disease processes/diagnoses(primary or secondary)? Pneumonia   Week 1 attempt successful? Yes  [pt requests to receive all 3 readmission management calls]   Call start time 1611   Call end time 1619   Discharge diagnosis Pneumonia involving right lung   Meds reviewed with patient/caregiver? Yes   Is the patient having any side effects they believe may be caused by any medication additions or changes? No   Does the patient have all medications ordered at discharge? Yes   Is the patient taking all medications as directed (includes completed medication regime)? Yes   Does the patient have a primary care provider?  No   PCP Nursing Intervention Provided number to obtain PCP  [has been given information for selecting PCP, is in process]   Does the patient have an appointment with their PCP or specialist within 7 days of discharge? --  [had appt with psychiatric services]   What is preventing the patient from scheduling follow up appointments within 7 days of discharge? Haven't had time   Nursing Interventions Advised patient to make appointment   Has the patient kept scheduled appointments due by today? Yes   Has home health visited the patient within 72 hours of discharge? N/A   Pulse Ox monitoring None   Psychosocial issues? No   Did the patient receive a copy of their discharge instructions? Yes   Nursing interventions Reviewed instructions with patient   What is the patient's perception of their health status since discharge? Improving   Nursing Interventions Nurse provided patient education   Is the patient/caregiver able to teach back the hierarchy of who to call/visit for symptoms/problems? PCP, Specialist, Home health nurse, Urgent Care, ED, 911 Yes   Is the patient/caregiver able to teach back signs and symptoms of worsening condition: Fever/chills, Shortness of  breath, Chest pain   Is the patient/caregiver able to teach back importance of completing antibiotic course of treatment? Yes   Week 1 call completed? Yes   Call end time 1613            Trupti FUNG - Registered Nurse

## 2024-04-16 ENCOUNTER — APPOINTMENT (OUTPATIENT)
Dept: GENERAL RADIOLOGY | Facility: HOSPITAL | Age: 34
DRG: 193 | End: 2024-04-16
Payer: MEDICAID

## 2024-04-16 ENCOUNTER — HOSPITAL ENCOUNTER (INPATIENT)
Facility: HOSPITAL | Age: 34
LOS: 3 days | Discharge: PSYCHIATRIC OR SUBSTANCE USE FACILITY | DRG: 193 | End: 2024-04-19
Attending: STUDENT IN AN ORGANIZED HEALTH CARE EDUCATION/TRAINING PROGRAM | Admitting: HOSPITALIST
Payer: MEDICAID

## 2024-04-16 DIAGNOSIS — J18.9 COMMUNITY ACQUIRED PNEUMONIA OF LEFT LUNG, UNSPECIFIED PART OF LUNG: Primary | ICD-10-CM

## 2024-04-16 DIAGNOSIS — J96.01 ACUTE RESPIRATORY FAILURE WITH HYPOXIA: ICD-10-CM

## 2024-04-16 DIAGNOSIS — T40.2X2A: ICD-10-CM

## 2024-04-16 DIAGNOSIS — T50.902A SUICIDE ATTEMPT BY DRUG OVERDOSE: ICD-10-CM

## 2024-04-16 LAB
ALBUMIN SERPL-MCNC: 3.9 G/DL (ref 3.5–5.2)
ALBUMIN/GLOB SERPL: 1.9 G/DL
ALP SERPL-CCNC: 69 U/L (ref 39–117)
ALT SERPL W P-5'-P-CCNC: 17 U/L (ref 1–41)
ANION GAP SERPL CALCULATED.3IONS-SCNC: 12 MMOL/L (ref 5–15)
APAP SERPL-MCNC: <5 MCG/ML (ref 0–30)
AST SERPL-CCNC: 34 U/L (ref 1–40)
BASOPHILS # BLD AUTO: 0.02 10*3/MM3 (ref 0–0.2)
BASOPHILS NFR BLD AUTO: 0.1 % (ref 0–1.5)
BILIRUB SERPL-MCNC: 0.7 MG/DL (ref 0–1.2)
BUN SERPL-MCNC: 18 MG/DL (ref 6–20)
BUN/CREAT SERPL: 13.7 (ref 7–25)
CALCIUM SPEC-SCNC: 7.7 MG/DL (ref 8.6–10.5)
CHLORIDE SERPL-SCNC: 105 MMOL/L (ref 98–107)
CK SERPL-CCNC: 353 U/L (ref 20–200)
CO2 SERPL-SCNC: 22 MMOL/L (ref 22–29)
CREAT SERPL-MCNC: 1.31 MG/DL (ref 0.76–1.27)
D-LACTATE SERPL-SCNC: 1.2 MMOL/L (ref 0.5–2)
DEPRECATED RDW RBC AUTO: 42.7 FL (ref 37–54)
EGFRCR SERPLBLD CKD-EPI 2021: 73.3 ML/MIN/1.73
EOSINOPHIL # BLD AUTO: 0.03 10*3/MM3 (ref 0–0.4)
EOSINOPHIL NFR BLD AUTO: 0.2 % (ref 0.3–6.2)
ERYTHROCYTE [DISTWIDTH] IN BLOOD BY AUTOMATED COUNT: 12.5 % (ref 12.3–15.4)
ETHANOL BLD-MCNC: <10 MG/DL (ref 0–10)
ETHANOL UR QL: <0.01 %
GLOBULIN UR ELPH-MCNC: 2.1 GM/DL
GLUCOSE SERPL-MCNC: 95 MG/DL (ref 65–99)
HCT VFR BLD AUTO: 44.8 % (ref 37.5–51)
HGB BLD-MCNC: 14.4 G/DL (ref 13–17.7)
IMM GRANULOCYTES # BLD AUTO: 0.05 10*3/MM3 (ref 0–0.05)
IMM GRANULOCYTES NFR BLD AUTO: 0.3 % (ref 0–0.5)
LYMPHOCYTES # BLD AUTO: 0.87 10*3/MM3 (ref 0.7–3.1)
LYMPHOCYTES NFR BLD AUTO: 5.9 % (ref 19.6–45.3)
MCH RBC QN AUTO: 29.8 PG (ref 26.6–33)
MCHC RBC AUTO-ENTMCNC: 32.1 G/DL (ref 31.5–35.7)
MCV RBC AUTO: 92.6 FL (ref 79–97)
MONOCYTES # BLD AUTO: 0.33 10*3/MM3 (ref 0.1–0.9)
MONOCYTES NFR BLD AUTO: 2.3 % (ref 5–12)
NEUTROPHILS NFR BLD AUTO: 13.34 10*3/MM3 (ref 1.7–7)
NEUTROPHILS NFR BLD AUTO: 91.2 % (ref 42.7–76)
NRBC BLD AUTO-RTO: 0 /100 WBC (ref 0–0.2)
PLATELET # BLD AUTO: 209 10*3/MM3 (ref 140–450)
PMV BLD AUTO: 10.7 FL (ref 6–12)
POTASSIUM SERPL-SCNC: 4.1 MMOL/L (ref 3.5–5.2)
PROT SERPL-MCNC: 6 G/DL (ref 6–8.5)
RBC # BLD AUTO: 4.84 10*6/MM3 (ref 4.14–5.8)
SALICYLATES SERPL-MCNC: <0.3 MG/DL
SODIUM SERPL-SCNC: 139 MMOL/L (ref 136–145)
WBC NRBC COR # BLD AUTO: 14.64 10*3/MM3 (ref 3.4–10.8)

## 2024-04-16 PROCEDURE — 80179 DRUG ASSAY SALICYLATE: CPT | Performed by: STUDENT IN AN ORGANIZED HEALTH CARE EDUCATION/TRAINING PROGRAM

## 2024-04-16 PROCEDURE — 83605 ASSAY OF LACTIC ACID: CPT | Performed by: STUDENT IN AN ORGANIZED HEALTH CARE EDUCATION/TRAINING PROGRAM

## 2024-04-16 PROCEDURE — 80143 DRUG ASSAY ACETAMINOPHEN: CPT | Performed by: STUDENT IN AN ORGANIZED HEALTH CARE EDUCATION/TRAINING PROGRAM

## 2024-04-16 PROCEDURE — 25810000003 LACTATED RINGERS SOLUTION: Performed by: STUDENT IN AN ORGANIZED HEALTH CARE EDUCATION/TRAINING PROGRAM

## 2024-04-16 PROCEDURE — 99285 EMERGENCY DEPT VISIT HI MDM: CPT

## 2024-04-16 PROCEDURE — 93010 ELECTROCARDIOGRAM REPORT: CPT | Performed by: INTERNAL MEDICINE

## 2024-04-16 PROCEDURE — 85025 COMPLETE CBC W/AUTO DIFF WBC: CPT | Performed by: STUDENT IN AN ORGANIZED HEALTH CARE EDUCATION/TRAINING PROGRAM

## 2024-04-16 PROCEDURE — 25010000002 NALOXONE PER 1 MG: Performed by: STUDENT IN AN ORGANIZED HEALTH CARE EDUCATION/TRAINING PROGRAM

## 2024-04-16 PROCEDURE — 25010000002 ONDANSETRON PER 1 MG: Performed by: STUDENT IN AN ORGANIZED HEALTH CARE EDUCATION/TRAINING PROGRAM

## 2024-04-16 PROCEDURE — 82550 ASSAY OF CK (CPK): CPT | Performed by: STUDENT IN AN ORGANIZED HEALTH CARE EDUCATION/TRAINING PROGRAM

## 2024-04-16 PROCEDURE — 80053 COMPREHEN METABOLIC PANEL: CPT | Performed by: STUDENT IN AN ORGANIZED HEALTH CARE EDUCATION/TRAINING PROGRAM

## 2024-04-16 PROCEDURE — 82077 ASSAY SPEC XCP UR&BREATH IA: CPT | Performed by: STUDENT IN AN ORGANIZED HEALTH CARE EDUCATION/TRAINING PROGRAM

## 2024-04-16 PROCEDURE — 71045 X-RAY EXAM CHEST 1 VIEW: CPT

## 2024-04-16 PROCEDURE — 25010000002 AMPICILLIN-SULBACTAM PER 1.5 G: Performed by: STUDENT IN AN ORGANIZED HEALTH CARE EDUCATION/TRAINING PROGRAM

## 2024-04-16 PROCEDURE — 25010000002 AZITHROMYCIN PER 500 MG: Performed by: STUDENT IN AN ORGANIZED HEALTH CARE EDUCATION/TRAINING PROGRAM

## 2024-04-16 PROCEDURE — 87040 BLOOD CULTURE FOR BACTERIA: CPT | Performed by: STUDENT IN AN ORGANIZED HEALTH CARE EDUCATION/TRAINING PROGRAM

## 2024-04-16 PROCEDURE — 36415 COLL VENOUS BLD VENIPUNCTURE: CPT

## 2024-04-16 PROCEDURE — 25810000003 SODIUM CHLORIDE 0.9 % SOLUTION 250 ML FLEX CONT: Performed by: STUDENT IN AN ORGANIZED HEALTH CARE EDUCATION/TRAINING PROGRAM

## 2024-04-16 PROCEDURE — 93005 ELECTROCARDIOGRAM TRACING: CPT | Performed by: STUDENT IN AN ORGANIZED HEALTH CARE EDUCATION/TRAINING PROGRAM

## 2024-04-16 RX ORDER — ONDANSETRON 2 MG/ML
4 INJECTION INTRAMUSCULAR; INTRAVENOUS ONCE
Status: COMPLETED | OUTPATIENT
Start: 2024-04-16 | End: 2024-04-16

## 2024-04-16 RX ORDER — NALOXONE HCL 0.4 MG/ML
0.4 VIAL (ML) INJECTION ONCE
Status: COMPLETED | OUTPATIENT
Start: 2024-04-16 | End: 2024-04-16

## 2024-04-16 RX ADMIN — ONDANSETRON 4 MG: 2 INJECTION INTRAMUSCULAR; INTRAVENOUS at 17:51

## 2024-04-16 RX ADMIN — NALXONE HYDROCHLORIDE 0.4 MG: 0.4 INJECTION INTRAMUSCULAR; INTRAVENOUS; SUBCUTANEOUS at 17:51

## 2024-04-16 RX ADMIN — AZITHROMYCIN MONOHYDRATE 500 MG: 500 INJECTION, POWDER, LYOPHILIZED, FOR SOLUTION INTRAVENOUS at 22:20

## 2024-04-16 RX ADMIN — SODIUM CHLORIDE, POTASSIUM CHLORIDE, SODIUM LACTATE AND CALCIUM CHLORIDE 1000 ML: 600; 310; 30; 20 INJECTION, SOLUTION INTRAVENOUS at 20:19

## 2024-04-16 RX ADMIN — NALXONE HYDROCHLORIDE 0.4 MG: 0.4 INJECTION INTRAMUSCULAR; INTRAVENOUS; SUBCUTANEOUS at 18:42

## 2024-04-16 RX ADMIN — AMPICILLIN SODIUM AND SULBACTAM SODIUM 3 G: 2; 1 INJECTION, POWDER, FOR SOLUTION INTRAMUSCULAR; INTRAVENOUS at 21:41

## 2024-04-16 NOTE — ED NOTES
"Wife arrives to bedside of patient and reports to this RN, \"he sent me a text message earlier that states \"actively suicidal. Going to get fentanyl presses.\" Wife showed this RN text messages. Pt denies SI currently but reports history of SI.   Charge OCTAVIO Knowles and MD Nieves informed.   Pt placed in SI precautions and sitter in place.   Monitors in place due to patient vitals not being stable on arrival to ER.   "

## 2024-04-16 NOTE — ED TRIAGE NOTES
Pt to Ed from home due to unresponsiveness. Pt's girl friend called due to AMS. Girlfriend stated CPR. EMS does not believe pt was a cardiac arrest.     When EMS arrived pt was found with agonal breathing. Pt was ventilated for 10 minutes, 1 mg narcan IV with 4 Mg of Zofran given by EMS.     Pt is alert to voice at time of triage and able to follow commands.     Pt complained of L forearm pain but unsure what happened.  
no

## 2024-04-17 PROBLEM — T50.902A SUICIDE ATTEMPT BY DRUG OVERDOSE: Status: ACTIVE | Noted: 2024-04-17

## 2024-04-17 PROBLEM — T40.2X1A OPIOID OVERDOSE: Status: ACTIVE | Noted: 2024-04-17

## 2024-04-17 PROBLEM — J96.01 ACUTE RESPIRATORY FAILURE WITH HYPOXIA: Status: ACTIVE | Noted: 2024-04-17

## 2024-04-17 LAB
ALBUMIN SERPL-MCNC: 3.9 G/DL (ref 3.5–5.2)
ALBUMIN/GLOB SERPL: 1.9 G/DL
ALP SERPL-CCNC: 63 U/L (ref 39–117)
ALT SERPL W P-5'-P-CCNC: 18 U/L (ref 1–41)
AMPHET+METHAMPHET UR QL: POSITIVE
ANION GAP SERPL CALCULATED.3IONS-SCNC: 9.3 MMOL/L (ref 5–15)
AST SERPL-CCNC: 32 U/L (ref 1–40)
BARBITURATES UR QL SCN: NEGATIVE
BENZODIAZ UR QL SCN: POSITIVE
BILIRUB SERPL-MCNC: 1.2 MG/DL (ref 0–1.2)
BILIRUB UR QL STRIP: NEGATIVE
BUN SERPL-MCNC: 15 MG/DL (ref 6–20)
BUN/CREAT SERPL: 14.4 (ref 7–25)
CALCIUM SPEC-SCNC: 8 MG/DL (ref 8.6–10.5)
CANNABINOIDS SERPL QL: POSITIVE
CHLORIDE SERPL-SCNC: 100 MMOL/L (ref 98–107)
CLARITY UR: CLEAR
CO2 SERPL-SCNC: 25.7 MMOL/L (ref 22–29)
COCAINE UR QL: NEGATIVE
COLOR UR: YELLOW
CREAT SERPL-MCNC: 1.04 MG/DL (ref 0.76–1.27)
DEPRECATED RDW RBC AUTO: 43.7 FL (ref 37–54)
EGFRCR SERPLBLD CKD-EPI 2021: 96.6 ML/MIN/1.73
ERYTHROCYTE [DISTWIDTH] IN BLOOD BY AUTOMATED COUNT: 12.7 % (ref 12.3–15.4)
FENTANYL UR-MCNC: POSITIVE NG/ML
GLOBULIN UR ELPH-MCNC: 2.1 GM/DL
GLUCOSE SERPL-MCNC: 67 MG/DL (ref 65–99)
GLUCOSE UR STRIP-MCNC: NEGATIVE MG/DL
HCT VFR BLD AUTO: 42.3 % (ref 37.5–51)
HGB BLD-MCNC: 13.7 G/DL (ref 13–17.7)
HGB UR QL STRIP.AUTO: NEGATIVE
KETONES UR QL STRIP: ABNORMAL
LEUKOCYTE ESTERASE UR QL STRIP.AUTO: NEGATIVE
MCH RBC QN AUTO: 29.9 PG (ref 26.6–33)
MCHC RBC AUTO-ENTMCNC: 32.4 G/DL (ref 31.5–35.7)
MCV RBC AUTO: 92.4 FL (ref 79–97)
METHADONE UR QL SCN: NEGATIVE
NITRITE UR QL STRIP: NEGATIVE
OPIATES UR QL: NEGATIVE
OXYCODONE UR QL SCN: NEGATIVE
PH UR STRIP.AUTO: 6 [PH] (ref 5–8)
PLATELET # BLD AUTO: 184 10*3/MM3 (ref 140–450)
PMV BLD AUTO: 11.1 FL (ref 6–12)
POTASSIUM SERPL-SCNC: 4.2 MMOL/L (ref 3.5–5.2)
PROT SERPL-MCNC: 6 G/DL (ref 6–8.5)
PROT UR QL STRIP: ABNORMAL
QT INTERVAL: 350 MS
QTC INTERVAL: 469 MS
RBC # BLD AUTO: 4.58 10*6/MM3 (ref 4.14–5.8)
SODIUM SERPL-SCNC: 135 MMOL/L (ref 136–145)
SP GR UR STRIP: 1.02 (ref 1–1.03)
UROBILINOGEN UR QL STRIP: ABNORMAL
WBC NRBC COR # BLD AUTO: 15.98 10*3/MM3 (ref 3.4–10.8)

## 2024-04-17 PROCEDURE — 25010000002 ONDANSETRON PER 1 MG: Performed by: NURSE PRACTITIONER

## 2024-04-17 PROCEDURE — 80053 COMPREHEN METABOLIC PANEL: CPT | Performed by: NURSE PRACTITIONER

## 2024-04-17 PROCEDURE — 25010000002 AMPICILLIN-SULBACTAM PER 1.5 G: Performed by: NURSE PRACTITIONER

## 2024-04-17 PROCEDURE — 80307 DRUG TEST PRSMV CHEM ANLYZR: CPT | Performed by: STUDENT IN AN ORGANIZED HEALTH CARE EDUCATION/TRAINING PROGRAM

## 2024-04-17 PROCEDURE — 36415 COLL VENOUS BLD VENIPUNCTURE: CPT | Performed by: NURSE PRACTITIONER

## 2024-04-17 PROCEDURE — 81003 URINALYSIS AUTO W/O SCOPE: CPT | Performed by: STUDENT IN AN ORGANIZED HEALTH CARE EDUCATION/TRAINING PROGRAM

## 2024-04-17 PROCEDURE — 90791 PSYCH DIAGNOSTIC EVALUATION: CPT

## 2024-04-17 PROCEDURE — 85027 COMPLETE CBC AUTOMATED: CPT | Performed by: NURSE PRACTITIONER

## 2024-04-17 RX ORDER — NITROGLYCERIN 0.4 MG/1
0.4 TABLET SUBLINGUAL
Status: DISCONTINUED | OUTPATIENT
Start: 2024-04-17 | End: 2024-04-20 | Stop reason: HOSPADM

## 2024-04-17 RX ORDER — SODIUM CHLORIDE 0.9 % (FLUSH) 0.9 %
10 SYRINGE (ML) INJECTION AS NEEDED
Status: DISCONTINUED | OUTPATIENT
Start: 2024-04-17 | End: 2024-04-20 | Stop reason: HOSPADM

## 2024-04-17 RX ORDER — ONDANSETRON 2 MG/ML
4 INJECTION INTRAMUSCULAR; INTRAVENOUS EVERY 6 HOURS PRN
Status: DISCONTINUED | OUTPATIENT
Start: 2024-04-17 | End: 2024-04-20 | Stop reason: HOSPADM

## 2024-04-17 RX ORDER — AMOXICILLIN 250 MG
2 CAPSULE ORAL 2 TIMES DAILY PRN
Status: DISCONTINUED | OUTPATIENT
Start: 2024-04-17 | End: 2024-04-20 | Stop reason: HOSPADM

## 2024-04-17 RX ORDER — POLYETHYLENE GLYCOL 3350 17 G/17G
17 POWDER, FOR SOLUTION ORAL DAILY PRN
Status: DISCONTINUED | OUTPATIENT
Start: 2024-04-17 | End: 2024-04-20 | Stop reason: HOSPADM

## 2024-04-17 RX ORDER — BISACODYL 5 MG/1
5 TABLET, DELAYED RELEASE ORAL DAILY PRN
Status: DISCONTINUED | OUTPATIENT
Start: 2024-04-17 | End: 2024-04-20 | Stop reason: HOSPADM

## 2024-04-17 RX ORDER — BISACODYL 10 MG
10 SUPPOSITORY, RECTAL RECTAL DAILY PRN
Status: DISCONTINUED | OUTPATIENT
Start: 2024-04-17 | End: 2024-04-20 | Stop reason: HOSPADM

## 2024-04-17 RX ORDER — ALUMINA, MAGNESIA, AND SIMETHICONE 2400; 2400; 240 MG/30ML; MG/30ML; MG/30ML
15 SUSPENSION ORAL EVERY 6 HOURS PRN
Status: DISCONTINUED | OUTPATIENT
Start: 2024-04-17 | End: 2024-04-20 | Stop reason: HOSPADM

## 2024-04-17 RX ORDER — ONDANSETRON 4 MG/1
4 TABLET, ORALLY DISINTEGRATING ORAL EVERY 6 HOURS PRN
Status: DISCONTINUED | OUTPATIENT
Start: 2024-04-17 | End: 2024-04-20 | Stop reason: HOSPADM

## 2024-04-17 RX ORDER — NICOTINE 21 MG/24HR
1 PATCH, TRANSDERMAL 24 HOURS TRANSDERMAL ONCE
Status: COMPLETED | OUTPATIENT
Start: 2024-04-17 | End: 2024-04-18

## 2024-04-17 RX ADMIN — AMPICILLIN SODIUM AND SULBACTAM SODIUM 3 G: 2; 1 INJECTION, POWDER, FOR SOLUTION INTRAMUSCULAR; INTRAVENOUS at 09:46

## 2024-04-17 RX ADMIN — Medication 1 PATCH: at 16:14

## 2024-04-17 RX ADMIN — ONDANSETRON 4 MG: 2 INJECTION INTRAMUSCULAR; INTRAVENOUS at 21:27

## 2024-04-17 RX ADMIN — AMPICILLIN SODIUM AND SULBACTAM SODIUM 3 G: 2; 1 INJECTION, POWDER, FOR SOLUTION INTRAMUSCULAR; INTRAVENOUS at 15:57

## 2024-04-17 RX ADMIN — AMPICILLIN SODIUM AND SULBACTAM SODIUM 3 G: 2; 1 INJECTION, POWDER, FOR SOLUTION INTRAMUSCULAR; INTRAVENOUS at 04:00

## 2024-04-17 RX ADMIN — AMPICILLIN SODIUM AND SULBACTAM SODIUM 3 G: 2; 1 INJECTION, POWDER, FOR SOLUTION INTRAMUSCULAR; INTRAVENOUS at 21:18

## 2024-04-17 NOTE — ED PROVIDER NOTES
EMERGENCY DEPARTMENT ENCOUNTER    Room Number:  12/12  PCP: Provider, No Known  History obtained from: Patient, EMS, wife      HPI:  Chief Complaint: Overdose  A complete HPI/ROS/PMH/PSH/SH/FH are unobtainable due to: Mental status change  Context: Davin Booker is a 34 y.o. male who presents to the ED c/o overdose.  He reports he took some fentanyl earlier today.  Wife says that a friend received a very concerning message that he was suicidal and had obtained some fentanyl pills.  She found him unresponsive and performed bystander CPR.  Reports he was apneic and cyanotic on her arrival.  She reports this has happened multiple times before.  Status post Narcan with EMS.  Patient currently reports some difficulty hearing however denies any other complaints.            PAST MEDICAL HISTORY  Active Ambulatory Problems     Diagnosis Date Noted    ADHD (attention deficit hyperactivity disorder)     Bipolar 1 disorder     Pneumonia involving right lung 02/29/2024    LANDON (acute kidney injury) 02/29/2024    CKD (chronic kidney disease) 02/29/2024     Resolved Ambulatory Problems     Diagnosis Date Noted    No Resolved Ambulatory Problems     No Additional Past Medical History         PAST SURGICAL HISTORY  Past Surgical History:   Procedure Laterality Date    TONSILLECTOMY           FAMILY HISTORY  Family History   Problem Relation Age of Onset    No Known Problems Mother     No Known Problems Father          SOCIAL HISTORY  Social History     Socioeconomic History    Marital status:    Tobacco Use    Smoking status: Former    Smokeless tobacco: Never    Tobacco comments:     2019   Vaping Use    Vaping status: Every Day    Substances: Nicotine, THC   Substance and Sexual Activity    Alcohol use: Yes     Comment: occasional    Drug use: Yes     Types: Marijuana, Cocaine(coke)     Comment: heroin    Sexual activity: Defer         ALLERGIES  Patient has no known allergies.        REVIEW OF SYSTEMS    As per  HPI      PHYSICAL EXAM  ED Triage Vitals [04/16/24 1730]   Temp Heart Rate Resp BP SpO2   98.4 °F (36.9 °C) 110 16 138/97 95 %      Temp src Heart Rate Source Patient Position BP Location FiO2 (%)   Oral -- -- -- --       Physical Exam  Constitutional:       General: He is not in acute distress.     Appearance: He is ill-appearing.      Comments: Drowsy   HENT:      Head: Normocephalic and atraumatic.   Cardiovascular:      Rate and Rhythm: Regular rhythm. Tachycardia present.   Pulmonary:      Effort: No respiratory distress.      Comments: Slow respirations  Abdominal:      General: There is no distension.      Tenderness: There is no abdominal tenderness.   Musculoskeletal:         General: No swelling or deformity.   Skin:     General: Skin is warm and dry.   Neurological:      General: No focal deficit present.      Mental Status: Mental status is at baseline.           Vital signs and nursing notes reviewed.          LAB RESULTS  Recent Results (from the past 24 hour(s))   ECG 12 Lead Altered Mental Status    Collection Time: 04/16/24  5:38 PM   Result Value Ref Range    QT Interval 350 ms    QTC Interval 469 ms   Comprehensive Metabolic Panel    Collection Time: 04/16/24  6:00 PM    Specimen: Blood   Result Value Ref Range    Glucose 95 65 - 99 mg/dL    BUN 18 6 - 20 mg/dL    Creatinine 1.31 (H) 0.76 - 1.27 mg/dL    Sodium 139 136 - 145 mmol/L    Potassium 4.1 3.5 - 5.2 mmol/L    Chloride 105 98 - 107 mmol/L    CO2 22.0 22.0 - 29.0 mmol/L    Calcium 7.7 (L) 8.6 - 10.5 mg/dL    Total Protein 6.0 6.0 - 8.5 g/dL    Albumin 3.9 3.5 - 5.2 g/dL    ALT (SGPT) 17 1 - 41 U/L    AST (SGOT) 34 1 - 40 U/L    Alkaline Phosphatase 69 39 - 117 U/L    Total Bilirubin 0.7 0.0 - 1.2 mg/dL    Globulin 2.1 gm/dL    A/G Ratio 1.9 g/dL    BUN/Creatinine Ratio 13.7 7.0 - 25.0    Anion Gap 12.0 5.0 - 15.0 mmol/L    eGFR 73.3 >60.0 mL/min/1.73   CK    Collection Time: 04/16/24  6:00 PM    Specimen: Blood   Result Value Ref Range     Creatine Kinase 353 (H) 20 - 200 U/L   CBC Auto Differential    Collection Time: 04/16/24  6:00 PM    Specimen: Blood   Result Value Ref Range    WBC 14.64 (H) 3.40 - 10.80 10*3/mm3    RBC 4.84 4.14 - 5.80 10*6/mm3    Hemoglobin 14.4 13.0 - 17.7 g/dL    Hematocrit 44.8 37.5 - 51.0 %    MCV 92.6 79.0 - 97.0 fL    MCH 29.8 26.6 - 33.0 pg    MCHC 32.1 31.5 - 35.7 g/dL    RDW 12.5 12.3 - 15.4 %    RDW-SD 42.7 37.0 - 54.0 fl    MPV 10.7 6.0 - 12.0 fL    Platelets 209 140 - 450 10*3/mm3    Neutrophil % 91.2 (H) 42.7 - 76.0 %    Lymphocyte % 5.9 (L) 19.6 - 45.3 %    Monocyte % 2.3 (L) 5.0 - 12.0 %    Eosinophil % 0.2 (L) 0.3 - 6.2 %    Basophil % 0.1 0.0 - 1.5 %    Immature Grans % 0.3 0.0 - 0.5 %    Neutrophils, Absolute 13.34 (H) 1.70 - 7.00 10*3/mm3    Lymphocytes, Absolute 0.87 0.70 - 3.10 10*3/mm3    Monocytes, Absolute 0.33 0.10 - 0.90 10*3/mm3    Eosinophils, Absolute 0.03 0.00 - 0.40 10*3/mm3    Basophils, Absolute 0.02 0.00 - 0.20 10*3/mm3    Immature Grans, Absolute 0.05 0.00 - 0.05 10*3/mm3    nRBC 0.0 0.0 - 0.2 /100 WBC   Acetaminophen Level    Collection Time: 04/16/24  6:00 PM    Specimen: Blood   Result Value Ref Range    Acetaminophen <5.0 0.0 - 30.0 mcg/mL   Ethanol    Collection Time: 04/16/24  6:00 PM    Specimen: Blood   Result Value Ref Range    Ethanol <10 0 - 10 mg/dL    Ethanol % <0.010 %   Salicylate Level    Collection Time: 04/16/24  6:00 PM    Specimen: Blood   Result Value Ref Range    Salicylate <0.3 <=30.0 mg/dL   Lactic Acid, Plasma    Collection Time: 04/16/24  9:39 PM    Specimen: Blood   Result Value Ref Range    Lactate 1.2 0.5 - 2.0 mmol/L       Ordered the above labs and reviewed the results.        RADIOLOGY  XR Chest 1 View    Result Date: 4/16/2024  SINGLE VIEW OF THE CHEST  HISTORY: Hypoxia  COMPARISON: February 29, 2024  FINDINGS: Heart size is within normal limits. No pneumothorax or pleural effusion is seen. The patient is noted to have left perihilar and left infrahilar  infiltrate. No pneumothorax or pleural effusion is seen.      Patchy infiltrates identified within the left lung, concerning for pneumonia.  This report was finalized on 4/16/2024 9:11 PM by Dr. Tori Mercado M.D on Workstation: [id number]       Ordered the above noted radiological studies. Reviewed by me in PACS.            MEDICATIONS GIVEN IN ER  Medications   naloxone (NARCAN) injection 0.4 mg (0.4 mg Intravenous Given 4/16/24 1751)   ondansetron (ZOFRAN) injection 4 mg (4 mg Intravenous Given 4/16/24 1751)   naloxone (NARCAN) injection 0.4 mg (0.4 mg Intravenous Given 4/16/24 1842)   lactated ringers bolus 1,000 mL (0 mL Intravenous Stopped 4/16/24 2140)   ampicillin-sulbactam (UNASYN) 3 g in sodium chloride 0.9 % 100 mL MBP (0 g Intravenous Stopped 4/16/24 2220)   azithromycin (ZITHROMAX) 500 mg in sodium chloride 0.9 % 250 mL IVPB-VTB (500 mg Intravenous New Bag 4/16/24 2220)               MEDICAL DECISION MAKING, PROGRESS, and CONSULTS    MDM: Patient presented to the emergency department with acute respiratory failure status post Narcan, respirations improved on arrival to the ER.  Placed on 3 L nasal cannula due to her persistent hypoxia.  Given additional Narcan with improved respiratory effort.  Labs otherwise significant for acute kidney injury, otherwise largely reassuring.  Chest x-ray does demonstrate changes consistent with pneumonia.  Discussed patient's case with his wife, it appears very clear that this was a suicide attempt and that patient is a very high risk for reattempting suicide.  Placed on 72-hour hold.  Discussed with inpatient team, will admit for further workup and management of his symptoms.    All labs have been independently reviewed by me.  All radiology studies have been reviewed by me and I have also reviewed the radiology report.   EKG's independently viewed and interpreted by me.  Discussion below represents my analysis of pertinent findings related to patient's  condition, differential diagnosis, treatment plan and final disposition.      Additional sources:  - Discussed/ obtained information from independent historians: Patient's wife    - External (non-ED) record review: Reviewed medical record, patient had an inpatient hospital stay last year in May    - Chronic or social conditions impacting care: Previous opioid overdose    - Shared decision making: Discussed plan for admission, patient and family agree      Orders placed during this visit:  Orders Placed This Encounter   Procedures    Blood Culture - Blood,    Blood Culture - Blood,    XR Chest 1 View    Comprehensive Metabolic Panel    Urinalysis With Microscopic If Indicated (No Culture) - Urine, Clean Catch    CK    CBC Auto Differential    Acetaminophen Level    Ethanol    Salicylate Level    Urine Drug Screen - Urine, Clean Catch    Lactic Acid, Plasma    Psych / Access to see    [facility] (on-call MD unless specified) Details    ECG 12 Lead Altered Mental Status    Inpatient Admission    Legal Status 72hr Hold    CBC & Differential         Additional orders considered but not ordered:  Considered CT PE however no indication at this time, will defer pending symptoms        Differential diagnosis includes but is not limited to:    Pulmonary embolism, pneumonia, sepsis, respiratory failure, suicide attempt, opioid overdose, coingestions      Independent interpretation of labs, radiology studies, and discussions with consultants:  ED Course as of 04/17/24 0005   Tue Apr 16, 2024 1757 EKG interpreted myself:  1738, sinus tachycardia rate of 108, no acute ST segment changes or T wave inversions. [FS]   2034 Chest x-ray interpreted myself:  Infiltrate on left [FS]      ED Course User Index  [FS] Rhett Nieves MD           DIAGNOSIS  Final diagnoses:   Community acquired pneumonia of left lung, unspecified part of lung   Acute respiratory failure with hypoxia   Suicide attempt by drug overdose   Opioid overdose,  intentional self-harm, initial encounter         DISPOSITION  Admitted to telemetry        Latest Documented Vital Signs:  As of 00:05 EDT  BP- 113/74 HR- 116 Temp- 98.4 °F (36.9 °C) (Oral) O2 sat- 95%              --    Please note that portions of this were completed with a voice recognition program.       Note Disclaimer: At [facility], we believe that sharing information builds trust and better relationships. You are receiving this note because you are receiving care at [facility] or recently visited. It is possible you will see health information before a provider has talked with you about it. This kind of information can be easy to misunderstand. To help you fully understand what it means for your health, we urge you to discuss this note with your provider.             Rhett Nieves MD  04/17/24 0009

## 2024-04-17 NOTE — NURSING NOTE
PT unable to void, and bladder scan showing 745 in bladder. PT becoming increasingly agitated when explained that I will need to straight cath. PT non compliant. Will contact

## 2024-04-17 NOTE — ED NOTES
Pt educated on need for urine sample, pt states he is unable to urinate at this time. Pt given water, juice, and IV fluids.

## 2024-04-17 NOTE — PLAN OF CARE
Problem: Adult Inpatient Plan of Care  Goal: Readiness for Transition of Care  Intervention: Mutually Develop Transition Plan  Recent Flowsheet Documentation  Taken 4/17/2024 0516 by Etta Olvera RN  Equipment Currently Used at Home: none  Taken 4/17/2024 0514 by Etta Olvera RN  Transportation Anticipated: family or friend will provide  Patient/Family Anticipated Services at Transition: mental health services  Patient/Family Anticipates Transition to: home with family     Problem: Adult Inpatient Plan of Care  Goal: Absence of Hospital-Acquired Illness or Injury  Intervention: Prevent and Manage VTE (Venous Thromboembolism) Risk  Recent Flowsheet Documentation  Taken 4/17/2024 0518 by Etta Olvera RN  Range of Motion: active ROM (range of motion) encouraged     Problem: Adult Inpatient Plan of Care  Goal: Absence of Hospital-Acquired Illness or Injury  Intervention: Prevent Infection  Recent Flowsheet Documentation  Taken 4/17/2024 0518 by Etta Olvera RN  Infection Prevention: environmental surveillance performed   Goal Outcome Evaluation:      PT AO4. VSS other than tachycardia when ambulating to bathroom. Attempts to void without success. Will bladder scan and go from there.

## 2024-04-17 NOTE — PLAN OF CARE
Goal Outcome Evaluation:   Pt refusing to be straight cath. Discussed importance with pt and pt continues to refuse. Dr. Anguiano notified .

## 2024-04-17 NOTE — NURSING NOTE
Patient arrived to floor via transport on 3L NC. Alert but lethargic. Patient is responsive to voice and commands. Very cooperative. PT currently sitting up and had ice chips. Denies any pain. VSS. WCTM. Sitter at bedside

## 2024-04-17 NOTE — PROGRESS NOTES
[facility] Services: Unasyn Consult    Pt Name: Davin Booker   : 1990  Weight: 81.6 kg (179 lb 14.3 oz)  Antibiotic: ampicillin-sulbactam   Indication: Pneumonia    Relevant clinical data and objective history reviewed:    Past Medical History:   Diagnosis Date    ADHD (attention deficit hyperactivity disorder)     Bipolar 1 disorder     Bipolar 1 disorder      Creatinine   Date Value Ref Range Status   2024 1.31 (H) 0.76 - 1.27 mg/dL Final   2024 0.92 0.76 - 1.27 mg/dL Final   2024 0.90 0.76 - 1.27 mg/dL Final     BUN   Date Value Ref Range Status   2024 18 6 - 20 mg/dL Final     Estimated Creatinine Clearance: 81.3 mL/min (A) (by C-G formula based on SCr of 1.31 mg/dL (H)).    Lab Results   Component Value Date    WBC 14.64 (H) 2024     Temp Readings from Last 3 Encounters:   24 98.4 °F (36.9 °C) (Oral)   24 97.2 °F (36.2 °C)   24 98.1 °F (36.7 °C) (Oral)      Assessment/Plan    Ordered Unasyn 3g q6h for a total of 5 days. Will monitor and adjust if culture data or pertinent lab values indicate this is best for the patient.     Thank you for this consult and please contact pharmacy with any questions or concerns.     Mehdi Hernandez [facility]  Clinical Pharmacist
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
## 2024-04-17 NOTE — NURSING NOTE
0830 Pt bladder scanned for 850ml. Pt refusing to be straight cath. States you are not doing that do not even try.

## 2024-04-17 NOTE — PROGRESS NOTES
[facility] follow up d/t suicidal thoughts; this writer reviewed chart and spoke with RN Etta. Per RN, patient slept most of the night; Narcan given x 3 in the ED.  Nursing reports patient still unable to void; clinical team will likely need to straight cath patient today during dayshift (aware of need for UDS).  72-hour hold and suicide precautions in place; psychiatrist, Dr. Irving, consult ordered.  This clinician unable to complete safety plan; [facility] to address once dispositional plan is clearer.  No further needs/concerns noted at this time per RN and/or medical team; Access Center to continue following.

## 2024-04-17 NOTE — H&P
HISTORY AND PHYSICAL   [facility]        Patient Identification:  Name: Davin Booker  Age: 34 y.o.  Sex: male  :  1990  MRN: [id number]                     Primary Care Physician: Provider, No Known    Chief Complaint: Drug overdose    History of Present Illness:       The patient  is a 34 y.o. male who presents to the ED c/o overdose.  He reports he took some fentanyl earlier today.  Wife says that a friend received a very concerning message that he was suicidal and had obtained some fentanyl pills.  She found him unresponsive and performed bystander CPR.  Reports he was apneic and cyanotic on her arrival.  She reports this has happened multiple times before.  Status post Narcan with EMS.  Patient currently reports some difficulty hearing however denies any other complaints.  The patient was evaluated in the ER and also found to have pneumonia.  He was started on broad-spectrum antibiotics and admitted for further evaluation treatment with suicide precautions.    Past Medical History:  Past Medical History:   Diagnosis Date    ADHD (attention deficit hyperactivity disorder)     Bipolar 1 disorder     Bipolar 1 disorder      Past Surgical History:  Past Surgical History:   Procedure Laterality Date    TONSILLECTOMY        Home Meds:  Medications Prior to Admission   Medication Sig Dispense Refill Last Dose    amphetamine-dextroamphetamine XR (Adderall XR) 30 MG 24 hr capsule Take 1 capsule by mouth 2 (Two) Times a Day   2024    ARIPiprazole (ABILIFY) 2 MG tablet Take 1 tablet by mouth Daily.   2024    clonazePAM (KlonoPIN) 1 MG tablet    2024    lamoTRIgine (LaMICtal) 100 MG tablet Take 2 tablets by mouth Daily.   2024    amoxicillin-clavulanate (AUGMENTIN) 875-125 MG per tablet Take 1 tablet by mouth 2 (two) times a day. 20 tablet 0     azithromycin (ZITHROMAX) 250 MG tablet Take 1 tablet by mouth daily for 4 days. 4 tablet 0     benzonatate (TESSALON) 100 MG capsule  Take 1 capsule by mouth 3 (Three) Times a Day As Needed for Cough. 30 capsule 0      Current meds    Current Facility-Administered Medications:     aluminum-magnesium hydroxide-simethicone (MAALOX MAX) 400-400-40 MG/5ML suspension 15 mL, 15 mL, Oral, Q6H PRN, Richard Sherman, APRN    ampicillin-sulbactam (UNASYN) 3 g in sodium chloride 0.9 % 100 mL MBP, 3 g, Intravenous, Q6H, Richard Sherman APRN, 3 g at 04/17/24 0946    sennosides-docusate (PERICOLACE) 8.6-50 MG per tablet 2 tablet, 2 tablet, Oral, BID PRN **AND** polyethylene glycol (MIRALAX) packet 17 g, 17 g, Oral, Daily PRN **AND** bisacodyl (DULCOLAX) EC tablet 5 mg, 5 mg, Oral, Daily PRN **AND** bisacodyl (DULCOLAX) suppository 10 mg, 10 mg, Rectal, Daily PRN, Richard Sherman, APRN    nicotine (NICODERM CQ) 21 MG/24HR patch 1 patch, 1 patch, Transdermal, Once, Rhett Nieves MD    nitroglycerin (NITROSTAT) SL tablet 0.4 mg, 0.4 mg, Sublingual, Q5 Min PRN, Richard Sherman, APRN    ondansetron ODT (ZOFRAN-ODT) disintegrating tablet 4 mg, 4 mg, Oral, Q6H PRN **OR** ondansetron (ZOFRAN) injection 4 mg, 4 mg, Intravenous, Q6H PRN, Richard Sherman, APREUGENIO    sodium chloride 0.9 % flush 10 mL, 10 mL, Intravenous, PRN, Richard Sherman, APRN  Allergies:  No Known Allergies  Immunizations:    There is no immunization history on file for this patient.  Social History:   Social History     Social History Narrative    Not on file     Social History     Socioeconomic History    Marital status:    Tobacco Use    Smoking status: Former    Smokeless tobacco: Never    Tobacco comments:     2019   Vaping Use    Vaping status: Every Day    Substances: Nicotine, THC   Substance and Sexual Activity    Alcohol use: Yes     Comment: occasional    Drug use: Yes     Types: Marijuana, Cocaine(coke)     Comment: heroin    Sexual activity: Defer       Family History:  Family History   Problem Relation Age of Onset    No Known Problems Mother     No Known  "Problems Father         Review of Systems  See history of present illness and past medical history.  Patient denies headache, dizziness, syncope, falls, trauma, change in vision, change in hearing, change in taste, changes in weight, changes in appetite, focal weakness, numbness, or paresthesia.  Patient denies chest pain, palpitations, dyspnea, orthopnea, PND, cough, sinus pressure, rhinorrhea, epistaxis, hemoptysis, nausea, vomiting, hematemesis, diarrhea, constipation or hematochezia. Denies cold or heat intolerance, polydipsia, polyuria, polyphagia. Denies hematuria, pyuria, dysuria, hesitancy, frequency or urgency.  Denies fever, chills, sweats, night sweats.  Denies missing any routine medications. Remainder of ROS is negative.    Objective:  tMax 24 hrs: Temp (24hrs), Av.3 °F (36.8 °C), Min:98.1 °F (36.7 °C), Max:98.4 °F (36.9 °C)    Vitals Ranges:   Temp:  [98.1 °F (36.7 °C)-98.4 °F (36.9 °C)] 98.1 °F (36.7 °C)  Heart Rate:  [104-124] 106  Resp:  [16-20] 18  BP: (107-141)/(64-98) 131/88      Exam:  /88 (BP Location: Right arm, Patient Position: Lying)   Pulse 106   Temp 98.1 °F (36.7 °C) (Oral)   Resp 18   Ht 170.2 cm (67\")   Wt 81.6 kg (179 lb 14.3 oz)   SpO2 95%   BMI 28.18 kg/m²     General Appearance:    Alert, cooperative, no distress, appears stated age   Head:    Normocephalic, without obvious abnormality, atraumatic   Eyes:    PERRL, conjunctivae/corneas clear, EOM's intact, both eyes   Ears:    Normal external ear canals, both ears   Nose:   Nares normal, septum midline, mucosa normal, no drainage    or sinus tenderness   Throat:   Lips, mucosa, and tongue normal   Neck:   Supple, symmetrical, trachea midline, no adenopathy;     thyroid:  no enlargement/tenderness/nodules; no carotid    bruit or JVD   Back:     Symmetric, no curvature, ROM normal, no CVA tenderness   Lungs:     Clear to auscultation bilaterally, respirations unlabored   Chest Wall:    No tenderness or deformity    " Heart:    Regular rate and rhythm, S1 and S2 normal, no murmur, rub   or gallop   Abdomen:     Soft, nontender, bowel sounds active all four quadrants,     no masses, no hepatomegaly, no splenomegaly   Extremities:   Extremities normal, atraumatic, no cyanosis or edema   Pulses:   2+ and symmetric all extremities   Skin:   Skin color, texture, turgor normal, no rashes or lesions   Lymph nodes:   Cervical, supraclavicular, and axillary nodes normal   Neurologic:   CNII-XII intact, normal strength, sensation intact throughout      .    Data Review:  Lab Results (last 72 hours)       Procedure Component Value Units Date/Time    Comprehensive Metabolic Panel [[id number]]  (Abnormal) Collected: 04/17/24 0338    Specimen: Blood Updated: 04/17/24 0429     Glucose 67 mg/dL      BUN 15 mg/dL      Creatinine 1.04 mg/dL      Sodium 135 mmol/L      Potassium 4.2 mmol/L      Chloride 100 mmol/L      CO2 25.7 mmol/L      Calcium 8.0 mg/dL      Total Protein 6.0 g/dL      Albumin 3.9 g/dL      ALT (SGPT) 18 U/L      AST (SGOT) 32 U/L      Alkaline Phosphatase 63 U/L      Total Bilirubin 1.2 mg/dL      Globulin 2.1 gm/dL      A/G Ratio 1.9 g/dL      BUN/Creatinine Ratio 14.4     Anion Gap 9.3 mmol/L      eGFR 96.6 mL/min/1.73     Narrative:      GFR Normal >60  Chronic Kidney Disease <60  Kidney Failure <15      CBC (No Diff) [[id number]]  (Abnormal) Collected: 04/17/24 0338    Specimen: Blood Updated: 04/17/24 0410     WBC 15.98 10*3/mm3      RBC 4.58 10*6/mm3      Hemoglobin 13.7 g/dL      Hematocrit 42.3 %      MCV 92.4 fL      MCH 29.9 pg      MCHC 32.4 g/dL      RDW 12.7 %      RDW-SD 43.7 fl      MPV 11.1 fL      Platelets 184 10*3/mm3     Lactic Acid, Plasma [[id number]]  (Normal) Collected: 04/16/24 2139    Specimen: Blood Updated: 04/16/24 2206     Lactate 1.2 mmol/L     Blood Culture - Blood, Arm, Right [[id number]] Collected: 04/16/24 2139    Specimen: Blood from Arm, Right Updated: 04/16/24 2143    Blood Culture - Blood,  Arm, Left [[id number]] Collected: 04/16/24 2139    Specimen: Blood from Arm, Left Updated: 04/16/24 2145    Comprehensive Metabolic Panel [[id number]]  (Abnormal) Collected: 04/16/24 1800    Specimen: Blood Updated: 04/16/24 1845     Glucose 95 mg/dL      BUN 18 mg/dL      Creatinine 1.31 mg/dL      Sodium 139 mmol/L      Potassium 4.1 mmol/L      Comment: Slight hemolysis detected by analyzer. Result may be falsely elevated.        Chloride 105 mmol/L      CO2 22.0 mmol/L      Calcium 7.7 mg/dL      Total Protein 6.0 g/dL      Albumin 3.9 g/dL      ALT (SGPT) 17 U/L      AST (SGOT) 34 U/L      Comment: Slight hemolysis detected by analyzer. Result may be falsely elevated.        Alkaline Phosphatase 69 U/L      Total Bilirubin 0.7 mg/dL      Globulin 2.1 gm/dL      A/G Ratio 1.9 g/dL      BUN/Creatinine Ratio 13.7     Anion Gap 12.0 mmol/L      eGFR 73.3 mL/min/1.73     Narrative:      GFR Normal >60  Chronic Kidney Disease <60  Kidney Failure <15      CK [[id number]]  (Abnormal) Collected: 04/16/24 1800    Specimen: Blood Updated: 04/16/24 1838     Creatine Kinase 353 U/L     Acetaminophen Level [[id number]]  (Normal) Collected: 04/16/24 1800    Specimen: Blood Updated: 04/16/24 1838     Acetaminophen <5.0 mcg/mL     Ethanol [[id number]] Collected: 04/16/24 1800    Specimen: Blood Updated: 04/16/24 1838     Ethanol <10 mg/dL      Ethanol % <0.010 %     Salicylate Level [[id number]]  (Normal) Collected: 04/16/24 1800    Specimen: Blood Updated: 04/16/24 1838     Salicylate <0.3 mg/dL     Narrative:      Therapeutic range for Salicylates:  3.0 - 10.0 mg/dL for antipyretic/analgesic conditions  15.0 - 30.0 mg/dL for anti-inflammatory conditions    CBC & Differential [[id number]]  (Abnormal) Collected: 04/16/24 1800    Specimen: Blood Updated: 04/16/24 1817    Narrative:      The following orders were created for panel order CBC & Differential.  Procedure                               Abnormality         Status                      ---------                               -----------         ------                     CBC Auto Differential[[id number]]        Abnormal            Final result                 Please view results for these tests on the individual orders.    CBC Auto Differential [[id number]]  (Abnormal) Collected: 04/16/24 1800    Specimen: Blood Updated: 04/16/24 1817     WBC 14.64 10*3/mm3      RBC 4.84 10*6/mm3      Hemoglobin 14.4 g/dL      Hematocrit 44.8 %      MCV 92.6 fL      MCH 29.8 pg      MCHC 32.1 g/dL      RDW 12.5 %      RDW-SD 42.7 fl      MPV 10.7 fL      Platelets 209 10*3/mm3      Neutrophil % 91.2 %      Lymphocyte % 5.9 %      Monocyte % 2.3 %      Eosinophil % 0.2 %      Basophil % 0.1 %      Immature Grans % 0.3 %      Neutrophils, Absolute 13.34 10*3/mm3      Lymphocytes, Absolute 0.87 10*3/mm3      Monocytes, Absolute 0.33 10*3/mm3      Eosinophils, Absolute 0.03 10*3/mm3      Basophils, Absolute 0.02 10*3/mm3      Immature Grans, Absolute 0.05 10*3/mm3      nRBC 0.0 /100 WBC                      Imaging Results (All)       Procedure Component Value Units Date/Time    XR Chest 1 View [[id number]] Collected: 04/16/24 2110     Updated: 04/16/24 2114    Narrative:      SINGLE VIEW OF THE CHEST     HISTORY: Hypoxia     COMPARISON: February 29, 2024     FINDINGS:  Heart size is within normal limits. No pneumothorax or pleural effusion  is seen. The patient is noted to have left perihilar and left infrahilar  infiltrate. No pneumothorax or pleural effusion is seen.       Impression:      Patchy infiltrates identified within the left lung, concerning for  pneumonia.     This report was finalized on 4/16/2024 9:11 PM by Dr. Tori Mercado M.D on Workstation: [id number]             Past Medical History:   Diagnosis Date    ADHD (attention deficit hyperactivity disorder)     Bipolar 1 disorder     Bipolar 1 disorder        Assessment:  Active Hospital Problems    Diagnosis  POA    **CAP (community acquired  pneumonia) [J18.9]  Yes    Opioid overdose [T40.2X1A]  Unknown    Suicide attempt by drug overdose [T50.902A]  Unknown    Acute respiratory failure with hypoxia [J96.01]  Unknown    ADHD (attention deficit hyperactivity disorder) [F90.9]  Yes    Bipolar 1 disorder [F31.9]  Yes      Resolved Hospital Problems   No resolved problems to display.       Plan:  The patient admitted to hospital continue with IV antibiotics for pneumonia.  Psychiatry consult for suicidal ideation and drug overdose.  Follow-up on labs and cultures.  Likely will need inpatient psychiatric service once he is medically stable from his pneumonia.    Ollie Anguiano MD  4/17/2024  14:43 EDT

## 2024-04-17 NOTE — ED NOTES
"Nursing report ED to floor  Davin Booker  34 y.o.  male    HPI :  HPI (Adult)  Stated Reason for Visit: overdose  History Obtained From: EMS    Chief Complaint  Chief Complaint   Patient presents with    Drug Overdose       Admitting doctor:   Norma Cooper MD    Admitting diagnosis:   The primary encounter diagnosis was Community acquired pneumonia of left lung, unspecified part of lung. Diagnoses of Acute respiratory failure with hypoxia, Suicide attempt by drug overdose, and Opioid overdose, intentional self-harm, initial encounter were also pertinent to this visit.    Code status:   Current Code Status       Date Active Code Status Order ID Comments User Context       Prior            Allergies:   Patient has no known allergies.    Isolation:   No active isolations    Intake and Output    Intake/Output Summary (Last 24 hours) at 4/17/2024 0005  Last data filed at 4/16/2024 2140  Gross per 24 hour   Intake 1000 ml   Output --   Net 1000 ml       Weight:       04/16/24  1844   Weight: 81.6 kg (179 lb 14.3 oz)       Most recent vitals:   Vitals:    04/16/24 1842 04/16/24 1844 04/16/24 2056 04/16/24 2356   BP:   135/98 113/74   Pulse: 104  104 116   Resp:       Temp:       TempSrc:       SpO2: 95%  100% 95%   Weight:  81.6 kg (179 lb 14.3 oz)     Height:  170.2 cm (67\")         Active LDAs/IV Access:   Lines, Drains & Airways       Active LDAs       Name Placement date Placement time Site Days    Peripheral IV 04/16/24 1728 Anterior;Distal;Left;Upper Arm 04/16/24  1728  Arm  less than 1                    Labs (abnormal labs have a star):   Labs Reviewed   COMPREHENSIVE METABOLIC PANEL - Abnormal; Notable for the following components:       Result Value    Creatinine 1.31 (*)     Calcium 7.7 (*)     All other components within normal limits    Narrative:     GFR Normal >60  Chronic Kidney Disease <60  Kidney Failure <15     CK - Abnormal; Notable for the following components:    Creatine Kinase 353 (*)     " All other components within normal limits   CBC WITH AUTO DIFFERENTIAL - Abnormal; Notable for the following components:    WBC 14.64 (*)     Neutrophil % 91.2 (*)     Lymphocyte % 5.9 (*)     Monocyte % 2.3 (*)     Eosinophil % 0.2 (*)     Neutrophils, Absolute 13.34 (*)     All other components within normal limits   ACETAMINOPHEN LEVEL - Normal   SALICYLATE LEVEL - Normal    Narrative:     Therapeutic range for Salicylates:  3.0 - 10.0 mg/dL for antipyretic/analgesic conditions  15.0 - 30.0 mg/dL for anti-inflammatory conditions   LACTIC ACID, PLASMA - Normal   BLOOD CULTURE   BLOOD CULTURE   ETHANOL   URINALYSIS W/ MICROSCOPIC IF INDICATED (NO CULTURE)   URINE DRUG SCREEN   CBC AND DIFFERENTIAL    Narrative:     The following orders were created for panel order CBC & Differential.  Procedure                               Abnormality         Status                     ---------                               -----------         ------                     CBC Auto Differential[[id number]]        Abnormal            Final result                 Please view results for these tests on the individual orders.       EKG:   ECG 12 Lead Altered Mental Status   Preliminary Result   HEART RATE= 108  bpm   RR Interval= 556  ms   MD Interval= 155  ms   P Horizontal Axis= 10  deg   P Front Axis= 67  deg   QRSD Interval= 94  ms   QT Interval= 350  ms   QTcB= 469  ms   QRS Axis= 73  deg   T Wave Axis= 53  deg   - BORDERLINE ECG -   Sinus tachycardia   Borderline prolonged QT interval   Electronically Signed By:    Date and Time of Study: 2024-04-16 17:38:39          Meds given in ED:   Medications   naloxone (NARCAN) injection 0.4 mg (0.4 mg Intravenous Given 4/16/24 1751)   ondansetron (ZOFRAN) injection 4 mg (4 mg Intravenous Given 4/16/24 1751)   naloxone (NARCAN) injection 0.4 mg (0.4 mg Intravenous Given 4/16/24 1842)   lactated ringers bolus 1,000 mL (0 mL Intravenous Stopped 4/16/24 2140)   ampicillin-sulbactam (UNASYN) 3 g  in sodium chloride 0.9 % 100 mL MBP (0 g Intravenous Stopped 4/16/24 2220)   azithromycin (ZITHROMAX) 500 mg in sodium chloride 0.9 % 250 mL IVPB-VTB (500 mg Intravenous New Bag 4/16/24 2220)       Imaging results:  XR Chest 1 View    Result Date: 4/16/2024  Patchy infiltrates identified within the left lung, concerning for pneumonia.  This report was finalized on 4/16/2024 9:11 PM by Dr. Tori Mercado M.D on Workstation: [id number]       Ambulatory status:   - bedrest    Social issues:   Social History     Socioeconomic History    Marital status:    Tobacco Use    Smoking status: Former    Smokeless tobacco: Never    Tobacco comments:     2019   Vaping Use    Vaping status: Every Day    Substances: Nicotine, THC   Substance and Sexual Activity    Alcohol use: Yes     Comment: occasional    Drug use: Yes     Types: Marijuana, Cocaine(coke)     Comment: heroin    Sexual activity: Defer       Peripheral Neurovascular  Peripheral Neurovascular (Adult)  Peripheral Neurovascular WDL: WDL    Neuro Cognitive  Neuro Cognitive (Adult)  Cognitive/Neuro/Behavioral WDL: WDL, orientation  Orientation: oriented x 4  Pupils  Pupil PERRLA: yes  Pupil Size Left: pinpoint  Pupil Size Right: pinpoint    Learning  Learning Assessment (Adult)  Learning Readiness and Ability: no barriers identified    Respiratory  Respiratory WDL  Respiratory WDL: WDL    Abdominal Pain       Pain Assessments  Pain (Adult)  (0-10) Pain Rating: Rest: 5  (0-10) Pain Rating: Activity: 5  Pain Location: other (see comments) (forearm)  Pain Side/Orientation: left    NIH Stroke Scale       Triny Golden RN  04/17/24 00:05 EDT

## 2024-04-18 LAB
ANION GAP SERPL CALCULATED.3IONS-SCNC: 7 MMOL/L (ref 5–15)
BASOPHILS # BLD AUTO: 0.01 10*3/MM3 (ref 0–0.2)
BASOPHILS NFR BLD AUTO: 0.1 % (ref 0–1.5)
BUN SERPL-MCNC: 9 MG/DL (ref 6–20)
BUN/CREAT SERPL: 8.9 (ref 7–25)
CALCIUM SPEC-SCNC: 7.8 MG/DL (ref 8.6–10.5)
CHLORIDE SERPL-SCNC: 101 MMOL/L (ref 98–107)
CO2 SERPL-SCNC: 29 MMOL/L (ref 22–29)
CREAT SERPL-MCNC: 1.01 MG/DL (ref 0.76–1.27)
DEPRECATED RDW RBC AUTO: 41.4 FL (ref 37–54)
EGFRCR SERPLBLD CKD-EPI 2021: 100.1 ML/MIN/1.73
EOSINOPHIL # BLD AUTO: 0.11 10*3/MM3 (ref 0–0.4)
EOSINOPHIL NFR BLD AUTO: 0.9 % (ref 0.3–6.2)
ERYTHROCYTE [DISTWIDTH] IN BLOOD BY AUTOMATED COUNT: 12.3 % (ref 12.3–15.4)
GLUCOSE SERPL-MCNC: 110 MG/DL (ref 65–99)
HCT VFR BLD AUTO: 40.1 % (ref 37.5–51)
HGB BLD-MCNC: 13.1 G/DL (ref 13–17.7)
IMM GRANULOCYTES # BLD AUTO: 0.03 10*3/MM3 (ref 0–0.05)
IMM GRANULOCYTES NFR BLD AUTO: 0.3 % (ref 0–0.5)
LYMPHOCYTES # BLD AUTO: 1.55 10*3/MM3 (ref 0.7–3.1)
LYMPHOCYTES NFR BLD AUTO: 13.3 % (ref 19.6–45.3)
MCH RBC QN AUTO: 29.8 PG (ref 26.6–33)
MCHC RBC AUTO-ENTMCNC: 32.7 G/DL (ref 31.5–35.7)
MCV RBC AUTO: 91.3 FL (ref 79–97)
MONOCYTES # BLD AUTO: 0.62 10*3/MM3 (ref 0.1–0.9)
MONOCYTES NFR BLD AUTO: 5.3 % (ref 5–12)
NEUTROPHILS NFR BLD AUTO: 80.1 % (ref 42.7–76)
NEUTROPHILS NFR BLD AUTO: 9.34 10*3/MM3 (ref 1.7–7)
NRBC BLD AUTO-RTO: 0 /100 WBC (ref 0–0.2)
PLATELET # BLD AUTO: 164 10*3/MM3 (ref 140–450)
PMV BLD AUTO: 10.9 FL (ref 6–12)
POTASSIUM SERPL-SCNC: 4.4 MMOL/L (ref 3.5–5.2)
RBC # BLD AUTO: 4.39 10*6/MM3 (ref 4.14–5.8)
SODIUM SERPL-SCNC: 137 MMOL/L (ref 136–145)
WBC NRBC COR # BLD AUTO: 11.66 10*3/MM3 (ref 3.4–10.8)

## 2024-04-18 PROCEDURE — 85025 COMPLETE CBC W/AUTO DIFF WBC: CPT | Performed by: HOSPITALIST

## 2024-04-18 PROCEDURE — 80048 BASIC METABOLIC PNL TOTAL CA: CPT | Performed by: HOSPITALIST

## 2024-04-18 PROCEDURE — 25010000002 AMPICILLIN-SULBACTAM PER 1.5 G: Performed by: NURSE PRACTITIONER

## 2024-04-18 PROCEDURE — 25010000002 ONDANSETRON PER 1 MG: Performed by: NURSE PRACTITIONER

## 2024-04-18 RX ADMIN — AMPICILLIN SODIUM AND SULBACTAM SODIUM 3 G: 2; 1 INJECTION, POWDER, FOR SOLUTION INTRAMUSCULAR; INTRAVENOUS at 04:56

## 2024-04-18 RX ADMIN — AMPICILLIN SODIUM AND SULBACTAM SODIUM 3 G: 2; 1 INJECTION, POWDER, FOR SOLUTION INTRAMUSCULAR; INTRAVENOUS at 09:12

## 2024-04-18 RX ADMIN — ONDANSETRON 4 MG: 2 INJECTION INTRAMUSCULAR; INTRAVENOUS at 16:34

## 2024-04-18 RX ADMIN — AMPICILLIN SODIUM AND SULBACTAM SODIUM 3 G: 2; 1 INJECTION, POWDER, FOR SOLUTION INTRAMUSCULAR; INTRAVENOUS at 22:32

## 2024-04-18 RX ADMIN — AMPICILLIN SODIUM AND SULBACTAM SODIUM 3 G: 2; 1 INJECTION, POWDER, FOR SOLUTION INTRAMUSCULAR; INTRAVENOUS at 15:03

## 2024-04-18 NOTE — PROGRESS NOTES
Access talked with Alena from unit. Unit wanted to clear pt's transfer from [facility] to Med Surg bed from all team members. Access called Dr Irving who is fine with transfer as long as pt has a sitter on Med Surg. Access waiting for documentation that pt is medically stable in order to start looking for a psychiatric bed.

## 2024-04-18 NOTE — PLAN OF CARE
Problem: Adult Inpatient Plan of Care  Goal: Plan of Care Review  Outcome: Ongoing, Progressing  Flowsheets (Taken 4/18/2024 0504)  Progress: no change  Pt is AO x 4. All vs stable. Sitter at bedside. No acute distress noted overnight. [facility]  Plan of Care Reviewed With: patient  Goal: Patient-Specific Goal (Individualized)  Outcome: Ongoing, Progressing  Goal: Absence of Hospital-Acquired Illness or Injury  Outcome: Ongoing, Progressing  Intervention: Identify and Manage Fall Risk  Description: Perform standard risk assessment on admission using a validated tool or comprehensive approach appropriate to the patient; reassess fall risk frequently, with change in status or transfer to another level of care.  Communicate fall injury risk to interprofessional healthcare team.  Determine need for increased observation, equipment and environmental modification, such as low bed, signage and supportive, nonskid footwear.  Adjust safety measures to individual developmental age, stage and identified risk factors.  Reinforce the importance of safety and physical activity with patient and family.  Perform regular intentional rounding to assess need for position change, pain assessment and personal needs, including assistance with toileting.  Recent Flowsheet Documentation  Taken 4/18/2024 0458 by Cyril Epps, RN  Safety Promotion/Fall Prevention:   activity supervised   assistive device/personal items within reach   clutter free environment maintained   fall prevention program maintained   muscle strengthening facilitated   nonskid shoes/slippers when out of bed   room organization consistent   safety round/check completed  Taken 4/18/2024 0200 by Cyril Epps, RN  Safety Promotion/Fall Prevention:   activity supervised   assistive device/personal items within reach   fall prevention program maintained   muscle strengthening facilitated   nonskid shoes/slippers when out of bed   room organization consistent   safety  round/check completed  Taken 4/18/2024 0000 by Cyril Epps RN  Safety Promotion/Fall Prevention:   assistive device/personal items within reach   activity supervised   clutter free environment maintained   fall prevention program maintained   muscle strengthening facilitated   nonskid shoes/slippers when out of bed   safety round/check completed   room organization consistent  Taken 4/17/2024 2200 by Cyril Epps RN  Safety Promotion/Fall Prevention:   activity supervised   assistive device/personal items within reach   fall prevention program maintained   clutter free environment maintained   nonskid shoes/slippers when out of bed   muscle strengthening facilitated   safety round/check completed   room organization consistent  Taken 4/17/2024 2030 by Cyril Epps RN  Safety Promotion/Fall Prevention:   activity supervised   assistive device/personal items within reach   fall prevention program maintained   clutter free environment maintained   nonskid shoes/slippers when out of bed   muscle strengthening facilitated   safety round/check completed   room organization consistent  Intervention: Prevent Skin Injury  Description: Perform a screening for skin injury risk, such as pressure or moisture associated skin damage on admission and at regular intervals throughout hospital stay.  Keep all areas of skin (especially folds) clean and dry.  Maintain adequate skin hydration.  Relieve and redistribute pressure and protect bony prominences; implement measures based on patient-specific risk factors.  Match turning and repositioning schedule to clinical condition.  Encourage weight shift frequently; assist with reposition if unable to complete independently.  Float heels off bed; avoid pressure on the Achilles tendon.  Keep skin free from extended contact with medical devices.  Encourage functional activity and mobility, as early as tolerated.  Use aids (e.g., slide boards, mechanical lift) during transfer.  Recent  Flowsheet Documentation  Taken 4/18/2024 0458 by Cyril Epps RN  Body Position: position changed independently  Taken 4/17/2024 2200 by Cyril Epps RN  Body Position: position changed independently  Taken 4/17/2024 2030 by Cyril Epps RN  Body Position: position changed independently  Intervention: Prevent and Manage VTE (Venous Thromboembolism) Risk  Description: Assess for VTE (venous thromboembolism) risk.  Encourage and assist with early ambulation.  Initiate and maintain compression or other therapy, as indicated, based on identified risk in accordance with organizational protocol and provider order.  Encourage both active and passive leg exercises while in bed, if unable to ambulate.  Recent Flowsheet Documentation  Taken 4/18/2024 0458 by Cyril Epps RN  Activity Management: activity encouraged  Taken 4/18/2024 0200 by Cyril Epps RN  Activity Management: activity encouraged  Taken 4/18/2024 0000 by Cyril Epps RN  Activity Management: activity encouraged  Taken 4/17/2024 2200 by Cyril Epps RN  Activity Management: activity encouraged  Taken 4/17/2024 2030 by Cyril Epps RN  Activity Management: activity encouraged  VTE Prevention/Management: patient refused intervention  Intervention: Prevent Infection  Description: Maintain skin and mucous membrane integrity; promote hand, oral and pulmonary hygiene.  Optimize fluid balance, nutrition, sleep and glycemic control to maximize infection resistance.  Identify potential sources of infection early to prevent or mitigate progression of infection (e.g., wound, lines, devices).  Evaluate ongoing need for invasive devices; remove promptly when no longer indicated.  Recent Flowsheet Documentation  Taken 4/18/2024 0458 by Cyril Epps RN  Infection Prevention:   visitors restricted/screened   single patient room provided   rest/sleep promoted   personal protective equipment utilized   equipment surfaces disinfected   environmental surveillance  performed   cohorting utilized  Taken 4/18/2024 0200 by Cyril Epps RN  Infection Prevention:   visitors restricted/screened   single patient room provided   personal protective equipment utilized   hand hygiene promoted   environmental surveillance performed  Taken 4/18/2024 0000 by Cyril Epps RN  Infection Prevention:   visitors restricted/screened   single patient room provided   rest/sleep promoted   personal protective equipment utilized   hand hygiene promoted   environmental surveillance performed   cohorting utilized  Taken 4/17/2024 2200 by Cyril Epps RN  Infection Prevention:   visitors restricted/screened   single patient room provided   rest/sleep promoted   personal protective equipment utilized   hand hygiene promoted   equipment surfaces disinfected   environmental surveillance performed   cohorting utilized  Taken 4/17/2024 2030 by Cyril Epps RN  Infection Prevention:   visitors restricted/screened   single patient room provided   hand hygiene promoted   equipment surfaces disinfected   environmental surveillance performed   personal protective equipment utilized   rest/sleep promoted  Goal: Optimal Comfort and Wellbeing  Outcome: Ongoing, Progressing  Intervention: Provide Person-Centered Care  Description: Use a family-focused approach to care.  Develop trust and rapport by proactively providing information, encouraging questions, addressing concerns and offering reassurance.  Acknowledge emotional response to hospitalization.  Recognize and utilize personal coping strategies.  Honor spiritual and cultural preferences.  Recent Flowsheet Documentation  Taken 4/18/2024 0000 by Cyril Epps RN  Trust Relationship/Rapport:   thoughts/feelings acknowledged   reassurance provided   questions encouraged   questions answered   empathic listening provided   emotional support provided   choices provided   care explained  Taken 4/17/2024 2030 by Cyril Epps RN  Trust Relationship/Rapport:    thoughts/feelings acknowledged   reassurance provided   questions encouraged   questions answered   empathic listening provided   emotional support provided   choices provided   care explained  Goal: Readiness for Transition of Care  Outcome: Ongoing, Progressing   Goal Outcome Evaluation:  Plan of Care Reviewed With: patient        Progress: no change

## 2024-04-18 NOTE — PROGRESS NOTES
Name: Davin Booker ADMIT: 2024   : 1990  PCP: Provider, No Known    MRN: [id number] LOS: 2 days   AGE/SEX: 34 y.o. male  ROOM: [facility]/     Subjective   This is a 34-year-old male with a history of bipolar 1 who presents to the hospital after an attempted overdose/suicidal attempt.  He took fentanyl and was found unresponsive, reportedly apneic and cyanotic.  He was given Narcan and then brought to the hospital for further management.  One-to-one observation was requested.  Chest x-ray was concerning for patchy infiltrates within the left lung and he was started on IV antibiotics.    He has no acute complaints.  He did not really want to interact with me.  Objective   Objective   Vital Signs  Heart Rate:  [] 94  Resp:  [16-18] 18  BP: (128-141)/(80-87) 128/80  SpO2:  [98 %-100 %] 98 %  on  Flow (L/min):  [1-2] 1;   Device (Oxygen Therapy): nasal cannula  Body mass index is 28.18 kg/m².  Physical Exam  Constitutional:       Appearance: He is ill-appearing.   HENT:      Head: Normocephalic and atraumatic.   Cardiovascular:      Rate and Rhythm: Normal rate and regular rhythm.   Pulmonary:      Effort: Pulmonary effort is normal. No respiratory distress.   Abdominal:      General: There is no distension.      Palpations: Abdomen is soft.      Tenderness: There is no abdominal tenderness.   Musculoskeletal:      Right lower leg: No edema.      Left lower leg: No edema.   Skin:     General: Skin is warm and dry.   Neurological:      Mental Status: He is alert and oriented to person, place, and time.         Results Review     I reviewed the patient's new clinical results.  Results from last 7 days   Lab Units 24  0417 24  0338 24  1800   WBC 10*3/mm3 11.66* 15.98* 14.64*   HEMOGLOBIN g/dL 13.1 13.7 14.4   PLATELETS 10*3/mm3 164 184 209     Results from last 7 days   Lab Units 24  0417 24  0338 24  1800   SODIUM mmol/L 137 135* 139   POTASSIUM mmol/L 4.4 4.2 4.1  "  CHLORIDE mmol/L 101 100 105   CO2 mmol/L 29.0 25.7 22.0   BUN mg/dL 9 15 18   CREATININE mg/dL 1.01 1.04 1.31*   GLUCOSE mg/dL 110* 67 95   Estimated Creatinine Clearance: 105.4 mL/min (by C-G formula based on SCr of 1.01 mg/dL).  Results from last 7 days   Lab Units 04/17/24  0338 04/16/24  1800   ALBUMIN g/dL 3.9 3.9   BILIRUBIN mg/dL 1.2 0.7   ALK PHOS U/L 63 69   AST (SGOT) U/L 32 34   ALT (SGPT) U/L 18 17     Results from last 7 days   Lab Units 04/18/24  0417 04/17/24  0338 04/16/24  1800   CALCIUM mg/dL 7.8* 8.0* 7.7*   ALBUMIN g/dL  --  3.9 3.9     Results from last 7 days   Lab Units 04/16/24  2139   LACTATE mmol/L 1.2     COVID19   Date Value Ref Range Status   02/29/2024 Not Detected Not Detected - Ref. Range Final     SARS-CoV-2, ALFONSO   Date Value Ref Range Status   03/28/2023 Negative Negative Final     No results found for: \"HGBA1C\", \"POCGLU\"  Results for orders placed or performed during the hospital encounter of 04/16/24   Blood Culture - Blood, Arm, Right    Specimen: Arm, Right; Blood   Result Value Ref Range    Blood Culture No growth at 24 hours          XR Chest 1 View  Narrative: SINGLE VIEW OF THE CHEST     HISTORY: Hypoxia     COMPARISON: February 29, 2024     FINDINGS:  Heart size is within normal limits. No pneumothorax or pleural effusion  is seen. The patient is noted to have left perihilar and left infrahilar  infiltrate. No pneumothorax or pleural effusion is seen.     Impression: Patchy infiltrates identified within the left lung, concerning for  pneumonia.     This report was finalized on 4/16/2024 9:11 PM by Dr. Tori Mercado M.D on Workstation: [id number]       Scheduled Medications  ampicillin-sulbactam, 3 g, Intravenous, Q6H    Infusions   Diet  Diet: Regular/House; Safe Tray; Fluid Consistency: Thin (IDDSI 0)       Assessment/Plan     Active Hospital Problems    Diagnosis  POA    **CAP (community acquired pneumonia) [J18.9]  Yes    Opioid overdose [T40.2X1A]  Unknown    " Suicide attempt by drug overdose [T50.902A]  Unknown    Acute respiratory failure with hypoxia [J96.01]  Unknown    ADHD (attention deficit hyperactivity disorder) [F90.9]  Yes    Bipolar 1 disorder [F31.9]  Yes      Resolved Hospital Problems   No resolved problems to display.       34 y.o. male admitted with CAP (community acquired pneumonia).    Community-acquired pneumonia  Acute respiratory failure with hypoxia  Possibly aspiration in setting of acute overdose.  Transition from ceftriaxone to Zosyn for anaerobic coverage    Bipolar 1 disorder  Opioid use disorder  Suicide attempt by drug overdose  Psychiatry consulted.  Inpatient psychiatric care will be sought.  Suicide precautions and one-to-one observation.      SCDs for DVT prophylaxis.  Full code.  Discussed with patient and nursing staff.  Anticipate discharge to inpatient psychiatric facility       Mark Aragon MD  [facility]  04/18/24  16:16 EDT

## 2024-04-18 NOTE — PLAN OF CARE
Goal Outcome Evaluation:           Progress: no change  Outcome Evaluation: Patient arrived to 4 from [facility] after 1800. Ambulated to bed. Ginger ale and crackers given. Patient resting, sitter at bedside. Sitter at bedside. Room air, A&Ox4.

## 2024-04-18 NOTE — PROGRESS NOTES
Access did follow up with pt. Pt seen by psychiatrist this morning and feels pt continues to need an inpt psychiatric bed. Access will begin looking when documented medical stability.Pt was not happy with facilities that Access contacts for bed availability but states he will go. Access will continue to follow.

## 2024-04-18 NOTE — PAYOR COMM NOTE
"Cara Rosenthal (34 y.o. Male)                               ATTENTION - PENDING CASE [id number]                                                   [employer] MEDICA[employer] PRIMARY-ANTHEM PLAN TERMED 12/1/23                           SEE VERIFICATION BELOW. REPLY TO UR DEPT  [phone] OR CALL .           Date of Birth   1990    Social Security Number       Address   [address] [place] [address]  [address] [place] [address]    Home Phone   [phone]    MRN   [id number]       Hindu   None    Marital Status                               Admission Date   4/16/24    Admission Type   Emergency    Admitting Provider   Ollie Anguiano MD    Attending Provider   Mark Aragon MD    Department, Room/Bed   [facility] CVI, 3111/1       Discharge Date       Discharge Disposition       Discharge Destination                                 Attending Provider: Mark Aragon MD    Allergies: No Known Allergies    Isolation: None   Infection: None   Code Status: CPR    Ht: 170.2 cm (67\")   Wt: 81.6 kg (179 lb 14.3 oz)    Admission Cmt: None   Principal Problem: CAP (community acquired pneumonia) [J18.9]                   Active Insurance as of 4/16/2024       Primary Coverage       Payor Plan Insurance Group Employer/Plan Group    [place] [id number]       Payor Plan Address Payor Plan Phone Number Payor Plan Fax Number Effective Dates    [employer] [address] [address] [phone]  1/1/2021 - None Entered    [place] [address]         Subscriber Name Subscriber Birth Date Member ID       CARA ROSENTHAL 1990 [id number]                     Emergency Contacts        (Rel.) Home Phone Work Phone Mobile Phone    Bhumi Castaneda (Mother) -- -- [phone]                 History & Physical        Ollie Anguiano MD at 04/17/24 1443          HISTORY AND PHYSICAL   [facility]        Patient Identification:  Name: Cara Rosenthal  Age: 34 " y.o.  Sex: male  :  1990  MRN: [id number]                     Primary Care Physician: Provider, No Known    Chief Complaint: Drug overdose    History of Present Illness:       The patient  is a 34 y.o. male who presents to the ED c/o overdose.  He reports he took some fentanyl earlier today.  Wife says that a friend received a very concerning message that he was suicidal and had obtained some fentanyl pills.  She found him unresponsive and performed bystander CPR.  Reports he was apneic and cyanotic on her arrival.  She reports this has happened multiple times before.  Status post Narcan with EMS.  Patient currently reports some difficulty hearing however denies any other complaints.  The patient was evaluated in the ER and also found to have pneumonia.  He was started on broad-spectrum antibiotics and admitted for further evaluation treatment with suicide precautions.    Past Medical History:  Past Medical History:   Diagnosis Date    ADHD (attention deficit hyperactivity disorder)     Bipolar 1 disorder     Bipolar 1 disorder      Past Surgical History:  Past Surgical History:   Procedure Laterality Date    TONSILLECTOMY        Home Meds:  Medications Prior to Admission   Medication Sig Dispense Refill Last Dose    amphetamine-dextroamphetamine XR (Adderall XR) 30 MG 24 hr capsule Take 1 capsule by mouth 2 (Two) Times a Day   2024    ARIPiprazole (ABILIFY) 2 MG tablet Take 1 tablet by mouth Daily.   2024    clonazePAM (KlonoPIN) 1 MG tablet    2024    lamoTRIgine (LaMICtal) 100 MG tablet Take 2 tablets by mouth Daily.   2024    amoxicillin-clavulanate (AUGMENTIN) 875-125 MG per tablet Take 1 tablet by mouth 2 (two) times a day. 20 tablet 0     azithromycin (ZITHROMAX) 250 MG tablet Take 1 tablet by mouth daily for 4 days. 4 tablet 0     benzonatate (TESSALON) 100 MG capsule Take 1 capsule by mouth 3 (Three) Times a Day As Needed for Cough. 30 capsule 0      Current meds    Current  Facility-Administered Medications:     aluminum-magnesium hydroxide-simethicone (MAALOX MAX) 400-400-40 MG/5ML suspension 15 mL, 15 mL, Oral, Q6H PRN, Richard Sherman APRN    ampicillin-sulbactam (UNASYN) 3 g in sodium chloride 0.9 % 100 mL MBP, 3 g, Intravenous, Q6H, Richard Sherman APRN, 3 g at 04/17/24 0946    sennosides-docusate (PERICOLACE) 8.6-50 MG per tablet 2 tablet, 2 tablet, Oral, BID PRN **AND** polyethylene glycol (MIRALAX) packet 17 g, 17 g, Oral, Daily PRN **AND** bisacodyl (DULCOLAX) EC tablet 5 mg, 5 mg, Oral, Daily PRN **AND** bisacodyl (DULCOLAX) suppository 10 mg, 10 mg, Rectal, Daily PRN, Richard Sherman APRN    nicotine (NICODERM CQ) 21 MG/24HR patch 1 patch, 1 patch, Transdermal, Once, Rhett Nieves MD    nitroglycerin (NITROSTAT) SL tablet 0.4 mg, 0.4 mg, Sublingual, Q5 Min PRN, Richard Sherman APRN    ondansetron ODT (ZOFRAN-ODT) disintegrating tablet 4 mg, 4 mg, Oral, Q6H PRN **OR** ondansetron (ZOFRAN) injection 4 mg, 4 mg, Intravenous, Q6H PRN, Richard Sherman APRN    sodium chloride 0.9 % flush 10 mL, 10 mL, Intravenous, PRN, Richard Sherman APRN  Allergies:  No Known Allergies  Immunizations:    There is no immunization history on file for this patient.  Social History:   Social History     Social History Narrative    Not on file     Social History     Socioeconomic History    Marital status:    Tobacco Use    Smoking status: Former    Smokeless tobacco: Never    Tobacco comments:     2019   Vaping Use    Vaping status: Every Day    Substances: Nicotine, THC   Substance and Sexual Activity    Alcohol use: Yes     Comment: occasional    Drug use: Yes     Types: Marijuana, Cocaine(coke)     Comment: heroin    Sexual activity: Defer       Family History:  Family History   Problem Relation Age of Onset    No Known Problems Mother     No Known Problems Father         Review of Systems  See history of present illness and past medical history.  Patient  "denies headache, dizziness, syncope, falls, trauma, change in vision, change in hearing, change in taste, changes in weight, changes in appetite, focal weakness, numbness, or paresthesia.  Patient denies chest pain, palpitations, dyspnea, orthopnea, PND, cough, sinus pressure, rhinorrhea, epistaxis, hemoptysis, nausea, vomiting, hematemesis, diarrhea, constipation or hematochezia. Denies cold or heat intolerance, polydipsia, polyuria, polyphagia. Denies hematuria, pyuria, dysuria, hesitancy, frequency or urgency.  Denies fever, chills, sweats, night sweats.  Denies missing any routine medications. Remainder of ROS is negative.    Objective:  tMax 24 hrs: Temp (24hrs), Av.3 °F (36.8 °C), Min:98.1 °F (36.7 °C), Max:98.4 °F (36.9 °C)    Vitals Ranges:   Temp:  [98.1 °F (36.7 °C)-98.4 °F (36.9 °C)] 98.1 °F (36.7 °C)  Heart Rate:  [104-124] 106  Resp:  [16-20] 18  BP: (107-141)/(64-98) 131/88      Exam:  /88 (BP Location: Right arm, Patient Position: Lying)   Pulse 106   Temp 98.1 °F (36.7 °C) (Oral)   Resp 18   Ht 170.2 cm (67\")   Wt 81.6 kg (179 lb 14.3 oz)   SpO2 95%   BMI 28.18 kg/m²     General Appearance:    Alert, cooperative, no distress, appears stated age   Head:    Normocephalic, without obvious abnormality, atraumatic   Eyes:    PERRL, conjunctivae/corneas clear, EOM's intact, both eyes   Ears:    Normal external ear canals, both ears   Nose:   Nares normal, septum midline, mucosa normal, no drainage    or sinus tenderness   Throat:   Lips, mucosa, and tongue normal   Neck:   Supple, symmetrical, trachea midline, no adenopathy;     thyroid:  no enlargement/tenderness/nodules; no carotid    bruit or JVD   Back:     Symmetric, no curvature, ROM normal, no CVA tenderness   Lungs:     Clear to auscultation bilaterally, respirations unlabored   Chest Wall:    No tenderness or deformity    Heart:    Regular rate and rhythm, S1 and S2 normal, no murmur, rub   or gallop   Abdomen:     Soft, " nontender, bowel sounds active all four quadrants,     no masses, no hepatomegaly, no splenomegaly   Extremities:   Extremities normal, atraumatic, no cyanosis or edema   Pulses:   2+ and symmetric all extremities   Skin:   Skin color, texture, turgor normal, no rashes or lesions   Lymph nodes:   Cervical, supraclavicular, and axillary nodes normal   Neurologic:   CNII-XII intact, normal strength, sensation intact throughout      .    Data Review:  Lab Results (last 72 hours)       Procedure Component Value Units Date/Time    Comprehensive Metabolic Panel [[id number]]  (Abnormal) Collected: 04/17/24 0338    Specimen: Blood Updated: 04/17/24 0429     Glucose 67 mg/dL      BUN 15 mg/dL      Creatinine 1.04 mg/dL      Sodium 135 mmol/L      Potassium 4.2 mmol/L      Chloride 100 mmol/L      CO2 25.7 mmol/L      Calcium 8.0 mg/dL      Total Protein 6.0 g/dL      Albumin 3.9 g/dL      ALT (SGPT) 18 U/L      AST (SGOT) 32 U/L      Alkaline Phosphatase 63 U/L      Total Bilirubin 1.2 mg/dL      Globulin 2.1 gm/dL      A/G Ratio 1.9 g/dL      BUN/Creatinine Ratio 14.4     Anion Gap 9.3 mmol/L      eGFR 96.6 mL/min/1.73     Narrative:      GFR Normal >60  Chronic Kidney Disease <60  Kidney Failure <15      CBC (No Diff) [[id number]]  (Abnormal) Collected: 04/17/24 0338    Specimen: Blood Updated: 04/17/24 0410     WBC 15.98 10*3/mm3      RBC 4.58 10*6/mm3      Hemoglobin 13.7 g/dL      Hematocrit 42.3 %      MCV 92.4 fL      MCH 29.9 pg      MCHC 32.4 g/dL      RDW 12.7 %      RDW-SD 43.7 fl      MPV 11.1 fL      Platelets 184 10*3/mm3     Lactic Acid, Plasma [[id number]]  (Normal) Collected: 04/16/24 2139    Specimen: Blood Updated: 04/16/24 2206     Lactate 1.2 mmol/L     Blood Culture - Blood, Arm, Right [[id number]] Collected: 04/16/24 2139    Specimen: Blood from Arm, Right Updated: 04/16/24 2145    Blood Culture - Blood, Arm, Left [[id number]] Collected: 04/16/24 2139    Specimen: Blood from Arm, Left Updated: 04/16/24 2141     Comprehensive Metabolic Panel [[id number]]  (Abnormal) Collected: 04/16/24 1800    Specimen: Blood Updated: 04/16/24 1845     Glucose 95 mg/dL      BUN 18 mg/dL      Creatinine 1.31 mg/dL      Sodium 139 mmol/L      Potassium 4.1 mmol/L      Comment: Slight hemolysis detected by analyzer. Result may be falsely elevated.        Chloride 105 mmol/L      CO2 22.0 mmol/L      Calcium 7.7 mg/dL      Total Protein 6.0 g/dL      Albumin 3.9 g/dL      ALT (SGPT) 17 U/L      AST (SGOT) 34 U/L      Comment: Slight hemolysis detected by analyzer. Result may be falsely elevated.        Alkaline Phosphatase 69 U/L      Total Bilirubin 0.7 mg/dL      Globulin 2.1 gm/dL      A/G Ratio 1.9 g/dL      BUN/Creatinine Ratio 13.7     Anion Gap 12.0 mmol/L      eGFR 73.3 mL/min/1.73     Narrative:      GFR Normal >60  Chronic Kidney Disease <60  Kidney Failure <15      CK [[id number]]  (Abnormal) Collected: 04/16/24 1800    Specimen: Blood Updated: 04/16/24 1838     Creatine Kinase 353 U/L     Acetaminophen Level [[id number]]  (Normal) Collected: 04/16/24 1800    Specimen: Blood Updated: 04/16/24 1838     Acetaminophen <5.0 mcg/mL     Ethanol [[id number]] Collected: 04/16/24 1800    Specimen: Blood Updated: 04/16/24 1838     Ethanol <10 mg/dL      Ethanol % <0.010 %     Salicylate Level [[id number]]  (Normal) Collected: 04/16/24 1800    Specimen: Blood Updated: 04/16/24 1838     Salicylate <0.3 mg/dL     Narrative:      Therapeutic range for Salicylates:  3.0 - 10.0 mg/dL for antipyretic/analgesic conditions  15.0 - 30.0 mg/dL for anti-inflammatory conditions    CBC & Differential [[id number]]  (Abnormal) Collected: 04/16/24 1800    Specimen: Blood Updated: 04/16/24 1817    Narrative:      The following orders were created for panel order CBC & Differential.  Procedure                               Abnormality         Status                     ---------                               -----------         ------                     CBC Auto  Differential[[id number]]        Abnormal            Final result                 Please view results for these tests on the individual orders.    CBC Auto Differential [[id number]]  (Abnormal) Collected: 04/16/24 1800    Specimen: Blood Updated: 04/16/24 1817     WBC 14.64 10*3/mm3      RBC 4.84 10*6/mm3      Hemoglobin 14.4 g/dL      Hematocrit 44.8 %      MCV 92.6 fL      MCH 29.8 pg      MCHC 32.1 g/dL      RDW 12.5 %      RDW-SD 42.7 fl      MPV 10.7 fL      Platelets 209 10*3/mm3      Neutrophil % 91.2 %      Lymphocyte % 5.9 %      Monocyte % 2.3 %      Eosinophil % 0.2 %      Basophil % 0.1 %      Immature Grans % 0.3 %      Neutrophils, Absolute 13.34 10*3/mm3      Lymphocytes, Absolute 0.87 10*3/mm3      Monocytes, Absolute 0.33 10*3/mm3      Eosinophils, Absolute 0.03 10*3/mm3      Basophils, Absolute 0.02 10*3/mm3      Immature Grans, Absolute 0.05 10*3/mm3      nRBC 0.0 /100 WBC                      Imaging Results (All)       Procedure Component Value Units Date/Time    XR Chest 1 View [[id number]] Collected: 04/16/24 2110     Updated: 04/16/24 2114    Narrative:      SINGLE VIEW OF THE CHEST     HISTORY: Hypoxia     COMPARISON: February 29, 2024     FINDINGS:  Heart size is within normal limits. No pneumothorax or pleural effusion  is seen. The patient is noted to have left perihilar and left infrahilar  infiltrate. No pneumothorax or pleural effusion is seen.       Impression:      Patchy infiltrates identified within the left lung, concerning for  pneumonia.     This report was finalized on 4/16/2024 9:11 PM by Dr. Tori Mercado M.D on Workstation: [id number]             Past Medical History:   Diagnosis Date    ADHD (attention deficit hyperactivity disorder)     Bipolar 1 disorder     Bipolar 1 disorder        Assessment:  Active Hospital Problems    Diagnosis  POA    **CAP (community acquired pneumonia) [J18.9]  Yes    Opioid overdose [T40.2X1A]  Unknown    Suicide attempt by drug overdose  [T50.902A]  Unknown    Acute respiratory failure with hypoxia [J96.01]  Unknown    ADHD (attention deficit hyperactivity disorder) [F90.9]  Yes    Bipolar 1 disorder [F31.9]  Yes      Resolved Hospital Problems   No resolved problems to display.       Plan:  The patient admitted to hospital continue with IV antibiotics for pneumonia.  Psychiatry consult for suicidal ideation and drug overdose.  Follow-up on labs and cultures.  Likely will need inpatient psychiatric service once he is medically stable from his pneumonia.    Ollie Anguiano MD  4/17/2024  14:43 EDT     Electronically signed by Ollie Anguiano MD at 04/17/24 1452          Emergency Department Notes        Triny Golden, RN at 04/17/24 0005          Nursing report ED to floor  Davin Booker  34 y.o.  male    HPI :  HPI (Adult)  Stated Reason for Visit: overdose  History Obtained From: EMS    Chief Complaint  Chief Complaint   Patient presents with    Drug Overdose       Admitting doctor:   Norma Cooper MD    Admitting diagnosis:   The primary encounter diagnosis was Community acquired pneumonia of left lung, unspecified part of lung. Diagnoses of Acute respiratory failure with hypoxia, Suicide attempt by drug overdose, and Opioid overdose, intentional self-harm, initial encounter were also pertinent to this visit.    Code status:   Current Code Status       Date Active Code Status Order ID Comments User Context       Prior            Allergies:   Patient has no known allergies.    Isolation:   No active isolations    Intake and Output    Intake/Output Summary (Last 24 hours) at 4/17/2024 0005  Last data filed at 4/16/2024 2140  Gross per 24 hour   Intake 1000 ml   Output --   Net 1000 ml       Weight:       04/16/24 1844   Weight: 81.6 kg (179 lb 14.3 oz)       Most recent vitals:   Vitals:    04/16/24 1842 04/16/24 1844 04/16/24 2056 04/16/24 2356   BP:   135/98 113/74   Pulse: 104  104 116   Resp:       Temp:       TempSrc:       SpO2: 95%   "100% 95%   Weight:  81.6 kg (179 lb 14.3 oz)     Height:  170.2 cm (67\")         Active LDAs/IV Access:   Lines, Drains & Airways       Active LDAs       Name Placement date Placement time Site Days    Peripheral IV 04/16/24 1728 Anterior;Distal;Left;Upper Arm 04/16/24  1728  Arm  less than 1                    Labs (abnormal labs have a star):   Labs Reviewed   COMPREHENSIVE METABOLIC PANEL - Abnormal; Notable for the following components:       Result Value    Creatinine 1.31 (*)     Calcium 7.7 (*)     All other components within normal limits    Narrative:     GFR Normal >60  Chronic Kidney Disease <60  Kidney Failure <15     CK - Abnormal; Notable for the following components:    Creatine Kinase 353 (*)     All other components within normal limits   CBC WITH AUTO DIFFERENTIAL - Abnormal; Notable for the following components:    WBC 14.64 (*)     Neutrophil % 91.2 (*)     Lymphocyte % 5.9 (*)     Monocyte % 2.3 (*)     Eosinophil % 0.2 (*)     Neutrophils, Absolute 13.34 (*)     All other components within normal limits   ACETAMINOPHEN LEVEL - Normal   SALICYLATE LEVEL - Normal    Narrative:     Therapeutic range for Salicylates:  3.0 - 10.0 mg/dL for antipyretic/analgesic conditions  15.0 - 30.0 mg/dL for anti-inflammatory conditions   LACTIC ACID, PLASMA - Normal   BLOOD CULTURE   BLOOD CULTURE   ETHANOL   URINALYSIS W/ MICROSCOPIC IF INDICATED (NO CULTURE)   URINE DRUG SCREEN   CBC AND DIFFERENTIAL    Narrative:     The following orders were created for panel order CBC & Differential.  Procedure                               Abnormality         Status                     ---------                               -----------         ------                     CBC Auto Differential[[id number]]        Abnormal            Final result                 Please view results for these tests on the individual orders.       EKG:   ECG 12 Lead Altered Mental Status   Preliminary Result   HEART RATE= 108  bpm   RR Interval= " 556  ms   IN Interval= 155  ms   P Horizontal Axis= 10  deg   P Front Axis= 67  deg   QRSD Interval= 94  ms   QT Interval= 350  ms   QTcB= 469  ms   QRS Axis= 73  deg   T Wave Axis= 53  deg   - BORDERLINE ECG -   Sinus tachycardia   Borderline prolonged QT interval   Electronically Signed By:    Date and Time of Study: 2024-04-16 17:38:39          Meds given in ED:   Medications   naloxone (NARCAN) injection 0.4 mg (0.4 mg Intravenous Given 4/16/24 1751)   ondansetron (ZOFRAN) injection 4 mg (4 mg Intravenous Given 4/16/24 1751)   naloxone (NARCAN) injection 0.4 mg (0.4 mg Intravenous Given 4/16/24 1842)   lactated ringers bolus 1,000 mL (0 mL Intravenous Stopped 4/16/24 2140)   ampicillin-sulbactam (UNASYN) 3 g in sodium chloride 0.9 % 100 mL MBP (0 g Intravenous Stopped 4/16/24 2220)   azithromycin (ZITHROMAX) 500 mg in sodium chloride 0.9 % 250 mL IVPB-VTB (500 mg Intravenous New Bag 4/16/24 2220)       Imaging results:  XR Chest 1 View    Result Date: 4/16/2024  Patchy infiltrates identified within the left lung, concerning for pneumonia.  This report was finalized on 4/16/2024 9:11 PM by Dr. Tori Mercado M.D on Workstation: [id number]       Ambulatory status:   - bedrest    Social issues:   Social History     Socioeconomic History    Marital status:    Tobacco Use    Smoking status: Former    Smokeless tobacco: Never    Tobacco comments:     2019   Vaping Use    Vaping status: Every Day    Substances: Nicotine, THC   Substance and Sexual Activity    Alcohol use: Yes     Comment: occasional    Drug use: Yes     Types: Marijuana, Cocaine(coke)     Comment: heroin    Sexual activity: Defer       Peripheral Neurovascular  Peripheral Neurovascular (Adult)  Peripheral Neurovascular WDL: WDL    Neuro Cognitive  Neuro Cognitive (Adult)  Cognitive/Neuro/Behavioral WDL: WDL, orientation  Orientation: oriented x 4  Pupils  Pupil PERRLA: yes  Pupil Size Left: pinpoint  Pupil Size Right:  pinpoint    Learning  Learning Assessment (Adult)  Learning Readiness and Ability: no barriers identified    Respiratory  Respiratory WDL  Respiratory WDL: WDL    Abdominal Pain       Pain Assessments  Pain (Adult)  (0-10) Pain Rating: Rest: 5  (0-10) Pain Rating: Activity: 5  Pain Location: other (see comments) (forearm)  Pain Side/Orientation: left    NIH Stroke Scale       Triny Golden RN  04/17/24 00:05 EDT     Electronically signed by Triny Golden RN at 04/17/24 0005       Rhett Nieves MD at 04/17/24 0005           EMERGENCY DEPARTMENT ENCOUNTER    Room Number:  12/12  PCP: Provider, No Known  History obtained from: Patient, EMS, wife      HPI:  Chief Complaint: Overdose  A complete HPI/ROS/PMH/PSH/SH/FH are unobtainable due to: Mental status change  Context: Davin Booker is a 34 y.o. male who presents to the ED c/o overdose.  He reports he took some fentanyl earlier today.  Wife says that a friend received a very concerning message that he was suicidal and had obtained some fentanyl pills.  She found him unresponsive and performed bystander CPR.  Reports he was apneic and cyanotic on her arrival.  She reports this has happened multiple times before.  Status post Narcan with EMS.  Patient currently reports some difficulty hearing however denies any other complaints.            PAST MEDICAL HISTORY  Active Ambulatory Problems     Diagnosis Date Noted    ADHD (attention deficit hyperactivity disorder)     Bipolar 1 disorder     Pneumonia involving right lung 02/29/2024    LANDON (acute kidney injury) 02/29/2024    CKD (chronic kidney disease) 02/29/2024     Resolved Ambulatory Problems     Diagnosis Date Noted    No Resolved Ambulatory Problems     No Additional Past Medical History         PAST SURGICAL HISTORY  Past Surgical History:   Procedure Laterality Date    TONSILLECTOMY           FAMILY HISTORY  Family History   Problem Relation Age of Onset    No Known Problems Mother     No Known Problems  Father          SOCIAL HISTORY  Social History     Socioeconomic History    Marital status:    Tobacco Use    Smoking status: Former    Smokeless tobacco: Never    Tobacco comments:     2019   Vaping Use    Vaping status: Every Day    Substances: Nicotine, THC   Substance and Sexual Activity    Alcohol use: Yes     Comment: occasional    Drug use: Yes     Types: Marijuana, Cocaine(coke)     Comment: heroin    Sexual activity: Defer         ALLERGIES  Patient has no known allergies.        REVIEW OF SYSTEMS    As per HPI      PHYSICAL EXAM  ED Triage Vitals [04/16/24 1730]   Temp Heart Rate Resp BP SpO2   98.4 °F (36.9 °C) 110 16 138/97 95 %      Temp src Heart Rate Source Patient Position BP Location FiO2 (%)   Oral -- -- -- --       Physical Exam  Constitutional:       General: He is not in acute distress.     Appearance: He is ill-appearing.      Comments: Drowsy   HENT:      Head: Normocephalic and atraumatic.   Cardiovascular:      Rate and Rhythm: Regular rhythm. Tachycardia present.   Pulmonary:      Effort: No respiratory distress.      Comments: Slow respirations  Abdominal:      General: There is no distension.      Tenderness: There is no abdominal tenderness.   Musculoskeletal:         General: No swelling or deformity.   Skin:     General: Skin is warm and dry.   Neurological:      General: No focal deficit present.      Mental Status: Mental status is at baseline.           Vital signs and nursing notes reviewed.          LAB RESULTS  Recent Results (from the past 24 hour(s))   ECG 12 Lead Altered Mental Status    Collection Time: 04/16/24  5:38 PM   Result Value Ref Range    QT Interval 350 ms    QTC Interval 469 ms   Comprehensive Metabolic Panel    Collection Time: 04/16/24  6:00 PM    Specimen: Blood   Result Value Ref Range    Glucose 95 65 - 99 mg/dL    BUN 18 6 - 20 mg/dL    Creatinine 1.31 (H) 0.76 - 1.27 mg/dL    Sodium 139 136 - 145 mmol/L    Potassium 4.1 3.5 - 5.2 mmol/L    Chloride  105 98 - 107 mmol/L    CO2 22.0 22.0 - 29.0 mmol/L    Calcium 7.7 (L) 8.6 - 10.5 mg/dL    Total Protein 6.0 6.0 - 8.5 g/dL    Albumin 3.9 3.5 - 5.2 g/dL    ALT (SGPT) 17 1 - 41 U/L    AST (SGOT) 34 1 - 40 U/L    Alkaline Phosphatase 69 39 - 117 U/L    Total Bilirubin 0.7 0.0 - 1.2 mg/dL    Globulin 2.1 gm/dL    A/G Ratio 1.9 g/dL    BUN/Creatinine Ratio 13.7 7.0 - 25.0    Anion Gap 12.0 5.0 - 15.0 mmol/L    eGFR 73.3 >60.0 mL/min/1.73   CK    Collection Time: 04/16/24  6:00 PM    Specimen: Blood   Result Value Ref Range    Creatine Kinase 353 (H) 20 - 200 U/L   CBC Auto Differential    Collection Time: 04/16/24  6:00 PM    Specimen: Blood   Result Value Ref Range    WBC 14.64 (H) 3.40 - 10.80 10*3/mm3    RBC 4.84 4.14 - 5.80 10*6/mm3    Hemoglobin 14.4 13.0 - 17.7 g/dL    Hematocrit 44.8 37.5 - 51.0 %    MCV 92.6 79.0 - 97.0 fL    MCH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

## 2024-04-18 NOTE — PROGRESS NOTES
Staff reports that the patient is medically stable.  He continues to endorse positive suicidal ideation and [facility] will begin searching for an appropriate inpatient psychiatric bed.

## 2024-04-19 VITALS
SYSTOLIC BLOOD PRESSURE: 125 MMHG | OXYGEN SATURATION: 96 % | DIASTOLIC BLOOD PRESSURE: 73 MMHG | RESPIRATION RATE: 18 BRPM | TEMPERATURE: 98.7 F | HEART RATE: 89 BPM | WEIGHT: 179.9 LBS | HEIGHT: 67 IN | BODY MASS INDEX: 28.24 KG/M2

## 2024-04-19 LAB
ANION GAP SERPL CALCULATED.3IONS-SCNC: 10 MMOL/L (ref 5–15)
BUN SERPL-MCNC: 8 MG/DL (ref 6–20)
BUN/CREAT SERPL: 10.5 (ref 7–25)
CALCIUM SPEC-SCNC: 8.9 MG/DL (ref 8.6–10.5)
CHLORIDE SERPL-SCNC: 100 MMOL/L (ref 98–107)
CO2 SERPL-SCNC: 28 MMOL/L (ref 22–29)
CREAT SERPL-MCNC: 0.76 MG/DL (ref 0.76–1.27)
DEPRECATED RDW RBC AUTO: 39.6 FL (ref 37–54)
EGFRCR SERPLBLD CKD-EPI 2021: 121 ML/MIN/1.73
ERYTHROCYTE [DISTWIDTH] IN BLOOD BY AUTOMATED COUNT: 12.1 % (ref 12.3–15.4)
GLUCOSE SERPL-MCNC: 85 MG/DL (ref 65–99)
HCT VFR BLD AUTO: 42.3 % (ref 37.5–51)
HGB BLD-MCNC: 13.9 G/DL (ref 13–17.7)
MCH RBC QN AUTO: 29.6 PG (ref 26.6–33)
MCHC RBC AUTO-ENTMCNC: 32.9 G/DL (ref 31.5–35.7)
MCV RBC AUTO: 90.2 FL (ref 79–97)
PLATELET # BLD AUTO: 183 10*3/MM3 (ref 140–450)
PMV BLD AUTO: 10.8 FL (ref 6–12)
POTASSIUM SERPL-SCNC: 4.4 MMOL/L (ref 3.5–5.2)
RBC # BLD AUTO: 4.69 10*6/MM3 (ref 4.14–5.8)
SODIUM SERPL-SCNC: 138 MMOL/L (ref 136–145)
WBC NRBC COR # BLD AUTO: 7.51 10*3/MM3 (ref 3.4–10.8)

## 2024-04-19 PROCEDURE — 85027 COMPLETE CBC AUTOMATED: CPT | Performed by: STUDENT IN AN ORGANIZED HEALTH CARE EDUCATION/TRAINING PROGRAM

## 2024-04-19 PROCEDURE — 25010000002 AMPICILLIN-SULBACTAM PER 1.5 G: Performed by: NURSE PRACTITIONER

## 2024-04-19 PROCEDURE — 80048 BASIC METABOLIC PNL TOTAL CA: CPT | Performed by: STUDENT IN AN ORGANIZED HEALTH CARE EDUCATION/TRAINING PROGRAM

## 2024-04-19 RX ORDER — AMOXICILLIN AND CLAVULANATE POTASSIUM 875; 125 MG/1; MG/1
1 TABLET, FILM COATED ORAL 2 TIMES DAILY
Qty: 10 TABLET | Refills: 0 | Status: SHIPPED | OUTPATIENT
Start: 2024-04-19

## 2024-04-19 RX ADMIN — AMPICILLIN SODIUM AND SULBACTAM SODIUM 3 G: 2; 1 INJECTION, POWDER, FOR SOLUTION INTRAMUSCULAR; INTRAVENOUS at 10:17

## 2024-04-19 RX ADMIN — AMPICILLIN SODIUM AND SULBACTAM SODIUM 3 G: 2; 1 INJECTION, POWDER, FOR SOLUTION INTRAMUSCULAR; INTRAVENOUS at 17:13

## 2024-04-19 RX ADMIN — AMPICILLIN SODIUM AND SULBACTAM SODIUM 3 G: 2; 1 INJECTION, POWDER, FOR SOLUTION INTRAMUSCULAR; INTRAVENOUS at 04:47

## 2024-04-19 NOTE — PROGRESS NOTES
"[facility] note.    In order to facilitate transfer of Pt to inpt psychiatric admission in a streamlined manner, [facility] staff asked medical team about the possibility of Pt being medically ready for discharge this day. Once [facility] was informed Pt is medically stable for discharge, an accepting facility was sought. Reviewer from [facility] contacted [facility] & asked that we confirm Pt understands he will be unable to take prescribed Adderall & Klonopin (PTA meds) if admitted to their facility. Notably, Pt has not rcvd those medications this admission.     Met w/Pt in room P499. Sitter was @ bedside. Introduced self/role & presented the above information. Pt replied, \"Well I haven't been getting them here..I expected (to not receive those prescriptions) no matter where I go (for inpt psych).\"     Information will be shared w/[facility] [place] to assist in their review process.     [facility] following until transfer.    "

## 2024-04-19 NOTE — DISCHARGE SUMMARY
Patient Name: Davin Booker  : 1990  MRN: [id number]    Date of Admission: 2024  Date of Discharge:  2024  Primary Care Physician: Provider, No Known      Chief Complaint:   Drug Overdose      Discharge Diagnoses     Active Hospital Problems    Diagnosis  POA    **CAP (community acquired pneumonia) [J18.9]  Yes    Opioid overdose [T40.2X1A]  Unknown    Suicide attempt by drug overdose [T50.902A]  Unknown    Acute respiratory failure with hypoxia [J96.01]  Unknown    ADHD (attention deficit hyperactivity disorder) [F90.9]  Yes    Bipolar 1 disorder [F31.9]  Yes      Resolved Hospital Problems   No resolved problems to display.        Hospital Course     This is a 34-year-old male with a history of bipolar 1 who presents to the hospital after an attempted overdose/suicidal attempt. He took fentanyl and was found unresponsive, reportedly apneic and cyanotic. He was given Narcan and then brought to the hospital for further management. One-to-one observation was requested. Chest x-ray was concerning for patchy infiltrates within the left lung and he was started on IV antibiotics.  Psychiatry saw the patient in consultation.  He continued her suicidal ideation this admission and so it was recommended that he be transferred to an inpatient psychiatric facility pending medical clearance.  His temperature curve improved on IV antibiotics and he will transition to oral equivalent to complete a 7-day course of total antibiotic therapy.        Physical Exam:  Temp:  [97.8 °F (36.6 °C)-100.1 °F (37.8 °C)] 98.5 °F (36.9 °C)  Heart Rate:  [70-95] 86  Resp:  [18] 18  BP: (108-125)/(66-82) 109/70  Body mass index is 28.18 kg/m².  Physical Exam  Constitutional:       General: He is not in acute distress.  HENT:      Head: Normocephalic and atraumatic.   Cardiovascular:      Rate and Rhythm: Normal rate and regular rhythm.   Pulmonary:      Effort: Pulmonary effort is normal. No respiratory distress.    Abdominal:      General: There is no distension.      Palpations: Abdomen is soft.      Tenderness: There is no abdominal tenderness.   Skin:     General: Skin is warm and dry.   Neurological:      Mental Status: He is alert and oriented to person, place, and time.   Psychiatric:      Comments: + suicidal ideation         Consultants     Consult Orders (all) (From admission, onward)       Start     Ordered    04/19/24 0922  Inpatient Psychiatrist Consult  Once        Specialty:  Psychiatry  Provider:  Bertin Irving III, MD    04/19/24 0923    04/17/24 0517  Inpatient Case Management  Consult  Once        Provider:  (Not yet assigned)    04/17/24 0516    04/17/24 0250  Inpatient Psychiatrist Consult  Once        Specialty:  Psychiatry  Provider:  Bertin Irving III, MD    04/17/24 0250 04/16/24 2233  [facility] (on-call MD unless specified) Details  Once        Specialty:  Hospitalist  Provider:  (Not yet assigned)    04/16/24 2232 04/16/24 2006  Psych / [facility] to see  Once        Provider:  (Not yet assigned)    04/16/24 2005                  Procedures     Imaging Results (All)       Procedure Component Value Units Date/Time    XR Chest 1 View [[id number]] Collected: 04/16/24 2110     Updated: 04/16/24 2114    Narrative:      SINGLE VIEW OF THE CHEST     HISTORY: Hypoxia     COMPARISON: February 29, 2024     FINDINGS:  Heart size is within normal limits. No pneumothorax or pleural effusion  is seen. The patient is noted to have left perihilar and left infrahilar  infiltrate. No pneumothorax or pleural effusion is seen.       Impression:      Patchy infiltrates identified within the left lung, concerning for  pneumonia.     This report was finalized on 4/16/2024 9:11 PM by Dr. Tori Mercado M.D on Workstation: [id number]               Pertinent Labs     Results from last 7 days   Lab Units 04/19/24  0636 04/18/24  0417 04/17/24  0338 04/16/24  1800   WBC 10*3/mm3 7.51  "11.66* 15.98* 14.64*   HEMOGLOBIN g/dL 13.9 13.1 13.7 14.4   PLATELETS 10*3/mm3 183 164 184 209     Results from last 7 days   Lab Units 04/19/24  0636 04/18/24 0417 04/17/24 0338 04/16/24  1800   SODIUM mmol/L 138 137 135* 139   POTASSIUM mmol/L 4.4 4.4 4.2 4.1   CHLORIDE mmol/L 100 101 100 105   CO2 mmol/L 28.0 29.0 25.7 22.0   BUN mg/dL 8 9 15 18   CREATININE mg/dL 0.76 1.01 1.04 1.31*   GLUCOSE mg/dL 85 110* 67 95   Estimated Creatinine Clearance: 140.1 mL/min (by C-G formula based on SCr of 0.76 mg/dL).  Results from last 7 days   Lab Units 04/17/24 0338 04/16/24  1800   ALBUMIN g/dL 3.9 3.9   BILIRUBIN mg/dL 1.2 0.7   ALK PHOS U/L 63 69   AST (SGOT) U/L 32 34   ALT (SGPT) U/L 18 17     Results from last 7 days   Lab Units 04/19/24  0636 04/18/24 0417 04/17/24 0338 04/16/24  1800   CALCIUM mg/dL 8.9 7.8* 8.0* 7.7*   ALBUMIN g/dL  --   --  3.9 3.9       Results from last 7 days   Lab Units 04/16/24  1800   CK TOTAL U/L 353*           Invalid input(s): \"LDLCALC\"  Results from last 7 days   Lab Units 04/16/24 2139   BLOODCX  No growth at 2 days  No growth at 2 days       Test Results Pending at Discharge     Pending Labs       Order Current Status    Blood Culture - Blood, Arm, Left Preliminary result    Blood Culture - Blood, Arm, Right Preliminary result            Discharge Details        Discharge Medications        Changes to Medications        Instructions Start Date   amoxicillin-clavulanate 875-125 MG per tablet  Commonly known as: AUGMENTIN  What changed:   how much to take  how to take this  when to take this  additional instructions   1 tablet, Oral, 2 Times Daily             Continue These Medications        Instructions Start Date   Adderall XR 30 MG 24 hr capsule  Generic drug: amphetamine-dextroamphetamine XR   30 mg, Oral, 2 Times Daily      ARIPiprazole 2 MG tablet  Commonly known as: ABILIFY   2 mg, Oral, Daily      benzonatate 100 MG capsule  Commonly known as: TESSALON   100 mg, Oral, 3 " Times Daily PRN      clonazePAM 1 MG tablet  Commonly known as: KlonoPIN       lamoTRIgine 100 MG tablet  Commonly known as: LaMICtal   200 mg, Oral, Daily             Stop These Medications      azithromycin 250 MG tablet  Commonly known as: ZITHROMAX              No Known Allergies      Discharge Disposition:  Psychiatric Hospital or Unit (DC - External or [facility])    Discharge Diet:  Diet Order   Procedures    Diet: Regular/House; Safe Tray; Fluid Consistency: Thin (IDDSI 0)       Discharge Activity:       CODE STATUS:    Code Status and Medical Interventions:   Ordered at: 04/17/24 0033     Code Status (Patient has no pulse and is not breathing):    CPR (Attempt to Resuscitate)     Medical Interventions (Patient has pulse or is breathing):    Full Support       No future appointments.   Contact information for follow-up providers       Provider, No Known .    Contact information:  [facility] [address] [phone]                       Contact information for after-discharge care       [facility]       [facility]    Service: [facility]  Contact information:  [address] [facility] [place] [address] [phone]                                   Time Spent on Discharge:  Greater than 30 minutes      Mark Aragon MD  [facility]  04/19/24  17:34 EDT

## 2024-04-19 NOTE — PROGRESS NOTES
Discharge Planning Assessment  [facility]     Patient Name: Davin Booker  MRN: [id number]  Today's Date: 4/19/2024    Admit Date: 4/16/2024        Discharge Needs Assessment    No documentation.                  Discharge Plan       Row Name 04/19/24 1145       Plan    Plan Comments Leonora from [facility] needs to know from MD when the patient will be medically clear today?  Leonora cannot begin looking for a bed until they  have that confirmation of medical clearance from the hospitalist group. MD answered the questions in his progress note today and I notified Leonora that his note is in EPIC. CCP will continue to follow.SHA Hernandes RN, CCP.                  Continued Care and Services - Admitted Since 4/16/2024    No active coordination exists for this encounter.       Expected Discharge Date and Time       Expected Discharge Date Expected Discharge Time    Apr 22, 2024            Demographic Summary    No documentation.                  Functional Status    No documentation.                  Psychosocial    No documentation.                  Abuse/Neglect    No documentation.                  Legal    No documentation.                  Substance Abuse    No documentation.                  Patient Forms    No documentation.                     Merry Hernandes RN

## 2024-04-19 NOTE — PROGRESS NOTES
"Discharge Planning Assessment  [facility]     Patient Name: Davin Booker  MRN: [id number]  Today's Date: 4/19/2024    Admit Date: 4/16/2024    Plan: [facility], [facility] to transport. SHA Hernandes Rn, CCP.   Discharge Needs Assessment    No documentation.                  Discharge Plan       Row Name 04/19/24 1436       Plan    Plan [facility], [facility] to transport. SHA Hernandes Rn, CCP.    Plan Comments Per Leonora \"Information faxed to [facility] for review for inpatient psych admission-  Received a call from Etta- pt has been accepted for inpatient psych admission at [facility].  Accepting doctor- Dr. Dao  Pt is going to the Adult Unit  Call report to [phone], use  prompt, then ask for Adult Unit.   [facility] would like an ETA when transport is scheduled.  This information was given to Ariela, nurse manager, with  pt's RN with her. \" Called [facility] and they can transport at 8:00 PM today. Notified RN and MD and they are okay to discharge today with the above information. Discharge packet given to RN. SHA Hernandes RN, CCP.      Row Name 04/19/24 1148       Plan    Plan Comments [facility] from [facility] needs to know from MD when the patient will be medically clear today?  Leonora cannot begin looking for a bed until they  have that confirmation of medical clearance from the hospitalist group. MD answered the questions in his progress note today and I notified Leonora that his note is in EPIC. CCP will continue to follow.SHA Hernandes RN, CCP.                  Continued Care and Services - Admitted Since 4/16/2024       Destination Coordination complete.      Service Provider Request Status Selected Services Address Phone Fax Patient Preferred    [facility] [address] [facility] [address] [phone] --                  Expected Discharge Date and Time       Expected Discharge Date Expected Discharge " Time    Apr 19, 2024            Demographic Summary    No documentation.                  Functional Status    No documentation.                  Psychosocial    No documentation.                  Abuse/Neglect    No documentation.                  Legal    No documentation.                  Substance Abuse    No documentation.                  Patient Forms    No documentation.                     Merry Hernandes RN

## 2024-04-19 NOTE — PROGRESS NOTES
"Physicians Statement of Medical Necessity for  Ambulance Transportation    GENERAL INFORMATION     Name: Davin Booker  YOB: 1990  [employer] [place]: #[id number]  Transport Date:                           (Valid for round trips this date, or for scheduled repetitive trips for 60 days from the date signed below.)  [place] [facility] [phone]  Destination: [facility]  Information faxed to [facility] for review for inpatient psych admission-  Received a call from Etta- pt has been accepted for inpatient psych admission at [facility].  Accepting doctor- Dr. Dao  Pt is going to the Adult Unit  Call report to [phone], use  prompt, then ask for Adult Unit.   [facility] would like an ETA when transport is scheduled.  This information was given to Ariela, nurse manager, with  pt's RN with her.                 Is the Patient's stay covered under Medicare Part A (PPS/DRG?)No   Closest appropriate facility? Yes  If this a hosp-hosp transfer? Yes, describe services needed at 2nd facility not available at 1st facility [facility]  Is this a hospice patient? No    MEDICAL NECESSITY QUESTIONAIRE    Ambulance Transportation is medically necessary only if other means of transportation are contraindicated or would be potentially harmful to the patient.  To meet this requirement, the patient must be either \"bed confined\" or suffer from a condition such that transport by means other than an ambulance is contraindicated by the patient's condition.  The following questions must be answered by the healthcare professional signing below for this form to be valid:     1) Describe the MEDICAL CONDITION (physical and/or mental) of this patient AT THE TIME OF AMBULANCE TRANSPORT that requires the patient to be transported in an ambulance, and why transport by other means is contraindicated by the patient's condition:    Past Medical History:   Diagnosis Date    " "ADHD (attention deficit hyperactivity disorder)     Bipolar 1 disorder     Bipolar 1 disorder       Past Surgical History:   Procedure Laterality Date    TONSILLECTOMY        2) Is this patient \"bed confined\" as defined below?No    To be \"bed confined\" the patient must satisfy all three of the following criteria:  (1) unable to get up from bed without assistance; AND (2) unable to ambulate;  AND (3) unable to sit in a chair or wheelchair.  3) Can this patient safely be transported by car or wheelchair van (I.e., may safely sit during transport, without an attendant or monitoring?)No   4. In addition to completing questions 1-3 above, please check any of the following conditions that apply*:          *Note: supporting documentation for any boxes checked must be maintained in the patient's medical records Danger to self/other and Orthopedic device (backboard, halo, pins, traction, brace, wedge, etc.) required special handling during transport      SIGNATURE OF PHYSICIAN OR OTHER AUTHORIZED HEALTHCARE PROFESSIONAL    I certify that the above information is true and correct based on my evaluation of this patient, and represent that the patient requires transport by ambulance and that other forms of transport are contraindicated.  I understand that this information will be used by the Centers for Medicare and Medicaid Services (CMS) to support the determiniation of medical necessity for ambulance services, and I represent that I have personal knowledge of the patient's condition at the time of transport.        If this box is checked, I also certify that the patient is physically or mentally incapable of signing the ambulance service's claim form and that the institution with which I am affiliated has furnished care, services or assistance to the patient.  My signature below is made on behalf of the patient pursuant to 42 .36(b)(4). In accordance with 42 .37, the specific reason(s) that the patient is " physically or mentally incapable of signing the claim for is as follows:      Signature of Physician or Healthcare Professional  Date/Time:         (For Scheduled repetitive transport, this form is not valid for transports performed more than 60 days after this date).                                                                                                                                            --------------------------------------------------------------------------------------------  Printed Name and Credentials of Physician or Authorized Healthcare Professional     *Form must be signed by patient's attending physician for scheduled, repetitive transports,.  For non-repetitive ambulance transports, if unable to obtain the signature of the attending physician, any of the following may sign (please select below):     Physician  Clinical Nurse Specialist  X Registered Nurse     Physician Assistant  Discharge Planner  Licensed Practical Nurse     Nurse Practitioner

## 2024-04-19 NOTE — CONSULTS
Dr. Aragon's note clearly indicates that the patient is medically stable for discharge on oral antibiotics today.  Accordingly, [facility] will begin searching for an inpatient psychiatric bed.

## 2024-04-19 NOTE — PROGRESS NOTES
Name: Davin Booker ADMIT: 2024   : 1990  PCP: Provider, No Known    MRN: [id number] LOS: 3 days   AGE/SEX: 34 y.o. male  ROOM: [address]   This is a 34-year-old male with a history of bipolar 1 who presents to the hospital after an attempted overdose/suicidal attempt.  He took fentanyl and was found unresponsive, reportedly apneic and cyanotic.  He was given Narcan and then brought to the hospital for further management.  One-to-one observation was requested.  Chest x-ray was concerning for patchy infiltrates within the left lung and he was started on IV antibiotics.      Tm 100.1. Blood culture NGTD.  He was only this morning and was eating breakfast.  Objective   Objective   Vital Signs  Temp:  [97.8 °F (36.6 °C)-100.1 °F (37.8 °C)] 97.8 °F (36.6 °C)  Heart Rate:  [70-94] 83  Resp:  [18] 18  BP: (108-128)/(66-82) 125/82  SpO2:  [97 %-98 %] 98 %  on   ;   Device (Oxygen Therapy): room air  Body mass index is 28.18 kg/m².  Physical Exam  Constitutional:       Appearance: He is ill-appearing.   HENT:      Head: Normocephalic and atraumatic.   Cardiovascular:      Rate and Rhythm: Normal rate and regular rhythm.   Pulmonary:      Effort: Pulmonary effort is normal. No respiratory distress.   Abdominal:      General: There is no distension.      Palpations: Abdomen is soft.      Tenderness: There is no abdominal tenderness.   Musculoskeletal:      Right lower leg: No edema.      Left lower leg: No edema.   Skin:     General: Skin is warm and dry.   Neurological:      Mental Status: He is alert and oriented to person, place, and time.         Results Review     I reviewed the patient's new clinical results.  Results from last 7 days   Lab Units 24  0636 24  0417 24  0338 24  1800   WBC 10*3/mm3 7.51 11.66* 15.98* 14.64*   HEMOGLOBIN g/dL 13.9 13.1 13.7 14.4   PLATELETS 10*3/mm3 183 164 184 209     Results from last 7 days   Lab Units 24  0636 24  0417  "04/17/24  0338 04/16/24  1800   SODIUM mmol/L 138 137 135* 139   POTASSIUM mmol/L 4.4 4.4 4.2 4.1   CHLORIDE mmol/L 100 101 100 105   CO2 mmol/L 28.0 29.0 25.7 22.0   BUN mg/dL 8 9 15 18   CREATININE mg/dL 0.76 1.01 1.04 1.31*   GLUCOSE mg/dL 85 110* 67 95   Estimated Creatinine Clearance: 140.1 mL/min (by C-G formula based on SCr of 0.76 mg/dL).  Results from last 7 days   Lab Units 04/17/24  0338 04/16/24  1800   ALBUMIN g/dL 3.9 3.9   BILIRUBIN mg/dL 1.2 0.7   ALK PHOS U/L 63 69   AST (SGOT) U/L 32 34   ALT (SGPT) U/L 18 17     Results from last 7 days   Lab Units 04/19/24  0636 04/18/24  0417 04/17/24  0338 04/16/24  1800   CALCIUM mg/dL 8.9 7.8* 8.0* 7.7*   ALBUMIN g/dL  --   --  3.9 3.9     Results from last 7 days   Lab Units 04/16/24  2139   LACTATE mmol/L 1.2     COVID19   Date Value Ref Range Status   02/29/2024 Not Detected Not Detected - Ref. Range Final     SARS-CoV-2, ALFONSO   Date Value Ref Range Status   03/28/2023 Negative Negative Final     No results found for: \"HGBA1C\", \"POCGLU\"  Results for orders placed or performed during the hospital encounter of 04/16/24   Blood Culture - Blood, Arm, Right    Specimen: Arm, Right; Blood   Result Value Ref Range    Blood Culture No growth at 2 days          XR Chest 1 View  Narrative: SINGLE VIEW OF THE CHEST     HISTORY: Hypoxia     COMPARISON: February 29, 2024     FINDINGS:  Heart size is within normal limits. No pneumothorax or pleural effusion  is seen. The patient is noted to have left perihilar and left infrahilar  infiltrate. No pneumothorax or pleural effusion is seen.     Impression: Patchy infiltrates identified within the left lung, concerning for  pneumonia.     This report was finalized on 4/16/2024 9:11 PM by Dr. Tori Mercado M.D on Workstation: [id number]       Scheduled Medications  ampicillin-sulbactam, 3 g, Intravenous, Q6H    Infusions   Diet  Diet: Regular/House; Safe Tray; Fluid Consistency: Thin (IDDSI 0)       Assessment/Plan "     Active Hospital Problems    Diagnosis  POA    **CAP (community acquired pneumonia) [J18.9]  Yes    Opioid overdose [T40.2X1A]  Unknown    Suicide attempt by drug overdose [T50.902A]  Unknown    Acute respiratory failure with hypoxia [J96.01]  Unknown    ADHD (attention deficit hyperactivity disorder) [F90.9]  Yes    Bipolar 1 disorder [F31.9]  Yes      Resolved Hospital Problems   No resolved problems to display.       34 y.o. male admitted with CAP (community acquired pneumonia).    Community-acquired pneumonia  Acute respiratory failure with hypoxia  Possibly aspiration in setting of acute overdose.  Now on Unasyn.  Will transition to Augmentin upon discharge      Bipolar 1 disorder  Opioid use disorder  Suicide attempt by drug overdose  Psychiatry consulted.  Inpatient psychiatric care will be sought.  Suicide precautions and one-to-one observation.  His blood cultures are negative at 48 hours.  He has not had any clinical fevers.  He is on room air.  When ready for discharge we will transition from Unasyn to Augmentin.      At this time, he is medically stable for discharge on oral antibiotics.         SCDs for DVT prophylaxis.  Full code.  Discussed with patient and nursing staff.  Anticipate discharge to inpatient psychiatric facility       Mark Aragon MD  [facility]  04/19/24  10:43 EDT

## 2024-04-19 NOTE — PROGRESS NOTES
Discharge Planning Assessment  [facility]     Patient Name: Davin Booker  MRN: [id number]  Today's Date: 4/19/2024    Admit Date: 4/16/2024        Discharge Needs Assessment    No documentation.                  Discharge Plan       Row Name 04/19/24 0750       Plan    Plan Comments This patient transferred to [facility] from [facility] on 4/18/24 @ 18:02. CCP will follow for any discharge needs. Received call from [facility] 3245 and she is working on placement.  SHA Hernandes RN, CCP.                  Continued Care and Services - Admitted Since 4/16/2024    No active coordination exists for this encounter.       Expected Discharge Date and Time       Expected Discharge Date Expected Discharge Time    Apr 22, 2024            Demographic Summary    No documentation.                  Functional Status    No documentation.                  Psychosocial    No documentation.                  Abuse/Neglect    No documentation.                  Legal    No documentation.                  Substance Abuse    No documentation.                  Patient Forms    No documentation.                     Merry Hernandes RN

## 2024-04-19 NOTE — NURSING NOTE
1455 RN asked pt if it was ok to inform his mother regarding the [facility] inpatient admission. Pt agreed for RN to notify his mother.       1520 Rn called and updated pt's mother, Bhumi Castaneda. Pt talked to his mother in the phone as well.

## 2024-04-19 NOTE — CASE MANAGEMENT/SOCIAL WORK
I spoke to Airam pt's RN-  Transport time to [facility] has been pushed back to 10:00 pm from 8:00 pm. I called [facility]-spoke to Panfilo- to let them know the ETA.

## 2024-04-19 NOTE — PLAN OF CARE
Goal Outcome Evaluation:  Plan of Care Reviewed With: patient        Progress: no change   Patient is alert and oriented times four. He has rested well this shift. No complaints of pain or discomfort. Sitter remains at the bedside. Will monitor.

## 2024-04-19 NOTE — PROGRESS NOTES
Discharge Planning Assessment  [facility]     Patient Name: Davin Booker  MRN: [id number]  Today's Date: 4/19/2024    Admit Date: 4/16/2024        Discharge Needs Assessment    No documentation.                  Discharge Plan       Row Name 04/19/24 1145       Plan    Plan Comments Leonora from [facility] needs to know from MD when the patient will be medically clear today?  Leonora cannot begin looking for a bed until they  have that confirmation of medical clearance from the hospitalist group. MD answered the questions in his progress note today and I notified Leonora that his note is in EPIC. CCP will continue to follow.SHA Hernandes RN, CCP.                  Continued Care and Services - Admitted Since 4/16/2024    No active coordination exists for this encounter.       Expected Discharge Date and Time       Expected Discharge Date Expected Discharge Time    Apr 19, 2024            Demographic Summary    No documentation.                  Functional Status    No documentation.                  Psychosocial    No documentation.                  Abuse/Neglect    No documentation.                  Legal    No documentation.                  Substance Abuse    No documentation.                  Patient Forms    No documentation.                     Merry Hernandes RN

## 2024-04-19 NOTE — CASE MANAGEMENT/SOCIAL WORK
Information faxed to [facility] for review for inpatient psych admission-  Received a call from Etta- pt has been accepted for inpatient psych admission at [facility].  Accepting doctor- Dr. Dao  Pt is going to the Adult Unit  Call report to [phone], use  prompt, then ask for Adult Unit.   [facility] would like an ETA when transport is scheduled.  This information was given to Ariela, nurse manager, with  pt's RN with her.

## 2024-04-20 NOTE — NURSING NOTE
Patient transferred to [facility] here to transport. Report called to the receiving facility. No personal belongings found for the patient. He states he had clothing in the ER.

## 2024-04-20 NOTE — CASE MANAGEMENT/SOCIAL WORK
Case Management Discharge Note      Final Note: dc to psych         Selected Continued Care - Discharged on 4/19/2024 Admission date: 4/16/2024 - Discharge disposition: Psychiatric Hospital or Unit (DC - External or [facility])      Destination Coordination complete.      Service Provider Selected Services Address Phone Fax Patient Preferred    [facility] [address] [facility] [address] [phone] --              Durable Medical Equipment    No services have been selected for the patient.                Dialysis/Infusion    No services have been selected for the patient.                Home Medical Care    No services have been selected for the patient.                Therapy    No services have been selected for the patient.                Community Resources    No services have been selected for the patient.                Community & DME    No services have been selected for the patient.                    Transportation Services  Ambulance: [facility]    Final Discharge Disposition Code: 65 - psychiatric hospital or unit

## 2024-04-21 LAB
BACTERIA SPEC AEROBE CULT: NORMAL
BACTERIA SPEC AEROBE CULT: NORMAL

## 2024-04-21 NOTE — PAYOR COMM NOTE
"Cara Rosenthal (34 y.o. Male)          DC SUMMARY FOR [id number]        Date of Birth   1990    Social Security Number       Address   [address] [place] [address]  [address] [place] [address]    Home Phone   [phone]    MRN   [id number]       Anglican   None    Marital Status                               Admission Date   24    Admission Type   Emergency    Admitting Provider   Ollie Anguiano MD    Attending Provider       Department, Room/Bed   [address], P499/1       Discharge Date   2024    Discharge Disposition   Psychiatric Hospital or Unit (DC - External or [facility])    Discharge Destination   Behavioral Health                              Attending Provider: (none)   Allergies: No Known Allergies    Isolation: None   Infection: None   Code Status: Prior    Ht: 170.2 cm (67\")   Wt: 81.6 kg (179 lb 14.3 oz)    Admission Cmt: None   Principal Problem: CAP (community acquired pneumonia) [J18.9]                   Active Insurance as of 2024       Primary Coverage       Payor Plan Insurance Group Employer/Plan Group    [place] [id number]       Payor Plan Address Payor Plan Phone Number Payor Plan Fax Number Effective Dates    [employer] [address] [address] [phone]  2021 - None Entered    [facility] [id number]         Subscriber Name Subscriber Birth Date Member ID       CARA ROSENTHAL 1990 [id number]                     Emergency Contacts        (Rel.) Home Phone Work Phone Mobile Phone    Bhumi Castaneda (Mother) -- -- [phone]                 Discharge Summary        Mark Aragon MD at 24 1734              Patient Name: Cara Rosenthal  : 1990  MRN: [id number]    Date of Admission: 2024  Date of Discharge:  2024  Primary Care Physician: Provider, No Known      Chief Complaint:   Drug Overdose      Discharge Diagnoses     Active Hospital Problems    Diagnosis  POA    **CAP (community " acquired pneumonia) [J18.9]  Yes    Opioid overdose [T40.2X1A]  Unknown    Suicide attempt by drug overdose [T50.902A]  Unknown    Acute respiratory failure with hypoxia [J96.01]  Unknown    ADHD (attention deficit hyperactivity disorder) [F90.9]  Yes    Bipolar 1 disorder [F31.9]  Yes      Resolved Hospital Problems   No resolved problems to display.        Hospital Course     This is a 34-year-old male with a history of bipolar 1 who presents to the hospital after an attempted overdose/suicidal attempt. He took fentanyl and was found unresponsive, reportedly apneic and cyanotic. He was given Narcan and then brought to the hospital for further management. One-to-one observation was requested. Chest x-ray was concerning for patchy infiltrates within the left lung and he was started on IV antibiotics.  Psychiatry saw the patient in consultation.  He continued her suicidal ideation this admission and so it was recommended that he be transferred to an inpatient psychiatric facility pending medical clearance.  His temperature curve improved on IV antibiotics and he will transition to oral equivalent to complete a 7-day course of total antibiotic therapy.        Physical Exam:  Temp:  [97.8 °F (36.6 °C)-100.1 °F (37.8 °C)] 98.5 °F (36.9 °C)  Heart Rate:  [70-95] 86  Resp:  [18] 18  BP: (108-125)/(66-82) 109/70  Body mass index is 28.18 kg/m².  Physical Exam  Constitutional:       General: He is not in acute distress.  HENT:      Head: Normocephalic and atraumatic.   Cardiovascular:      Rate and Rhythm: Normal rate and regular rhythm.   Pulmonary:      Effort: Pulmonary effort is normal. No respiratory distress.   Abdominal:      General: There is no distension.      Palpations: Abdomen is soft.      Tenderness: There is no abdominal tenderness.   Skin:     General: Skin is warm and dry.   Neurological:      Mental Status: He is alert and oriented to person, place, and time.   Psychiatric:      Comments: + suicidal  ideation         Consultants     Consult Orders (all) (From admission, onward)       Start     Ordered    04/19/24 0922  Inpatient Psychiatrist Consult  Once        Specialty:  Psychiatry  Provider:  Bertin Irving III, MD    04/19/24 0923    04/17/24 0517  Inpatient Case Management  Consult  Once        Provider:  (Not yet assigned)    04/17/24 0516    04/17/24 0250  Inpatient Psychiatrist Consult  Once        Specialty:  Psychiatry  Provider:  Bertin Irving III, MD    04/17/24 0250 04/16/24 2233  [facility] (on-call MD unless specified) Details  Once        Specialty:  Hospitalist  Provider:  (Not yet assigned)    04/16/24 2232 04/16/24 2006  Psych / Access to see  Once        Provider:  (Not yet assigned)    04/16/24 2005                  Procedures     Imaging Results (All)       Procedure Component Value Units Date/Time    XR Chest 1 View [[id number]] Collected: 04/16/24 2110     Updated: 04/16/24 2114    Narrative:      SINGLE VIEW OF THE CHEST     HISTORY: Hypoxia     COMPARISON: February 29, 2024     FINDINGS:  Heart size is within normal limits. No pneumothorax or pleural effusion  is seen. The patient is noted to have left perihilar and left infrahilar  infiltrate. No pneumothorax or pleural effusion is seen.       Impression:      Patchy infiltrates identified within the left lung, concerning for  pneumonia.     This report was finalized on 4/16/2024 9:11 PM by Dr. Tori Mercado M.D on Workstation: [id number]               Pertinent Labs     Results from last 7 days   Lab Units 04/19/24 0636 04/18/24 0417 04/17/24 0338 04/16/24  1800   WBC 10*3/mm3 7.51 11.66* 15.98* 14.64*   HEMOGLOBIN g/dL 13.9 13.1 13.7 14.4   PLATELETS 10*3/mm3 183 164 184 209     Results from last 7 days   Lab Units 04/19/24  0636 04/18/24 0417 04/17/24 0338 04/16/24  1800   SODIUM mmol/L 138 137 135* 139   POTASSIUM mmol/L 4.4 4.4 4.2 4.1   CHLORIDE mmol/L 100 101 100 105   CO2  "mmol/L 28.0 29.0 25.7 22.0   BUN mg/dL 8 9 15 18   CREATININE mg/dL 0.76 1.01 1.04 1.31*   GLUCOSE mg/dL 85 110* 67 95   Estimated Creatinine Clearance: 140.1 mL/min (by C-G formula based on SCr of 0.76 mg/dL).  Results from last 7 days   Lab Units 04/17/24  0338 04/16/24  1800   ALBUMIN g/dL 3.9 3.9   BILIRUBIN mg/dL 1.2 0.7   ALK PHOS U/L 63 69   AST (SGOT) U/L 32 34   ALT (SGPT) U/L 18 17     Results from last 7 days   Lab Units 04/19/24  0636 04/18/24  0417 04/17/24  0338 04/16/24  1800   CALCIUM mg/dL 8.9 7.8* 8.0* 7.7*   ALBUMIN g/dL  --   --  3.9 3.9       Results from last 7 days   Lab Units 04/16/24  1800   CK TOTAL U/L 353*           Invalid input(s): \"LDLCALC\"  Results from last 7 days   Lab Units 04/16/24  2139   BLOODCX  No growth at 2 days  No growth at 2 days       Test Results Pending at Discharge     Pending Labs       Order Current Status    Blood Culture - Blood, Arm, Left Preliminary result    Blood Culture - Blood, Arm, Right Preliminary result            Discharge Details        Discharge Medications        Changes to Medications        Instructions Start Date   amoxicillin-clavulanate 875-125 MG per tablet  Commonly known as: AUGMENTIN  What changed:   how much to take  how to take this  when to take this  additional instructions   1 tablet, Oral, 2 Times Daily             Continue These Medications        Instructions Start Date   Adderall XR 30 MG 24 hr capsule  Generic drug: amphetamine-dextroamphetamine XR   30 mg, Oral, 2 Times Daily      ARIPiprazole 2 MG tablet  Commonly known as: ABILIFY   2 mg, Oral, Daily      benzonatate 100 MG capsule  Commonly known as: TESSALON   100 mg, Oral, 3 Times Daily PRN      clonazePAM 1 MG tablet  Commonly known as: KlonoPIN       lamoTRIgine 100 MG tablet  Commonly known as: LaMICtal   200 mg, Oral, Daily             Stop These Medications      azithromycin 250 MG tablet  Commonly known as: ZITHROMAX              No Known Allergies      Discharge " Disposition:  Psychiatric Hospital or Unit (DC - External or [facility])    Discharge Diet:  Diet Order   Procedures    Diet: Regular/House; Safe Tray; Fluid Consistency: Thin (IDDSI 0)       Discharge Activity:       CODE STATUS:    Code Status and Medical Interventions:   Ordered at: 04/17/24 0033     Code Status (Patient has no pulse and is not breathing):    CPR (Attempt to Resuscitate)     Medical Interventions (Patient has pulse or is breathing):    Full Support       No future appointments.   Contact information for follow-up providers       Provider, No Known .    Contact information:  [facility] [address] [phone]                       Contact information for after-discharge care       Destination       [facility] .    Service: Mental Health Hospital Treatment  Contact information:  [address] [place] [id number] [phone]                                   Time Spent on Discharge:  Greater than 30 minutes      Mark Aragon MD  [facility]  04/19/24  17:34 EDT                Electronically signed by Mark Aragon MD at 04/19/24 4223

## 2024-05-04 ENCOUNTER — HOSPITAL ENCOUNTER (EMERGENCY)
Facility: HOSPITAL | Age: 34
Discharge: HOME | End: 2024-05-04
Attending: EMERGENCY MEDICINE
Payer: MEDICAID

## 2024-05-04 VITALS
SYSTOLIC BLOOD PRESSURE: 105 MMHG | DIASTOLIC BLOOD PRESSURE: 71 MMHG | TEMPERATURE: 95.7 F | HEIGHT: 67 IN | BODY MASS INDEX: 27 KG/M2 | HEART RATE: 73 BPM | OXYGEN SATURATION: 96 % | WEIGHT: 172 LBS | RESPIRATION RATE: 20 BRPM

## 2024-05-04 DIAGNOSIS — T40.2X1A OPIOID OVERDOSE, ACCIDENTAL OR UNINTENTIONAL, INITIAL ENCOUNTER: Primary | ICD-10-CM

## 2024-05-04 PROCEDURE — 99282 EMERGENCY DEPT VISIT SF MDM: CPT

## 2024-05-04 NOTE — ED PROVIDER NOTES
EMERGENCY DEPARTMENT ENCOUNTER    Room Number:  12/12  PCP: Provider, No Known  Historian: Patient/family      HPI:  Chief Complaint: Drug overdose  A complete HPI/ROS/PMH/PSH/SH/FH are unobtainable due to: None  Context: Davin Booker is a 34 y.o. male who presents to the ED c/o sudden onset of a drug overdose that occurred just prior to ED arrival today.  He reports that approximately 1 hour ago he decided to take what he thought was a clonazepam from a friend of his.  He began having sedation as well as nausea and vomiting and realized that there was probably an associated opioid with this medication.  He has had a similar presentation previously where he overdosed on opioids with similar physical findings.  Currently, he reports that the nausea and vomiting is resolved.  He denies any thoughts of self-harm, headache, chest pain, shortness of breath, or fever/chills.            PAST MEDICAL HISTORY  Active Ambulatory Problems     Diagnosis Date Noted    ADHD (attention deficit hyperactivity disorder)     Bipolar 1 disorder     Pneumonia involving right lung 02/29/2024    LANDON (acute kidney injury) 02/29/2024    CKD (chronic kidney disease) 02/29/2024    CAP (community acquired pneumonia) 04/16/2024    Opioid overdose 04/17/2024    Suicide attempt by drug overdose 04/17/2024    Acute respiratory failure with hypoxia 04/17/2024     Resolved Ambulatory Problems     Diagnosis Date Noted    No Resolved Ambulatory Problems     No Additional Past Medical History         PAST SURGICAL HISTORY  Past Surgical History:   Procedure Laterality Date    TONSILLECTOMY           FAMILY HISTORY  Family History   Problem Relation Age of Onset    No Known Problems Mother     No Known Problems Father          SOCIAL HISTORY  Social History     Socioeconomic History    Marital status:    Tobacco Use    Smoking status: Former    Smokeless tobacco: Never    Tobacco comments:     2019   Vaping Use    Vaping status: Every Day     Substances: Nicotine, THC   Substance and Sexual Activity    Alcohol use: Yes     Comment: occasional    Drug use: Yes     Types: Marijuana, Cocaine(coke)     Comment: heroin    Sexual activity: Defer         ALLERGIES  Patient has no known allergies.        REVIEW OF SYSTEMS  Review of Systems   Constitutional:  Negative for activity change, appetite change and fever.   HENT:  Negative for congestion and sore throat.    Eyes: Negative.    Respiratory:  Negative for cough and shortness of breath.    Cardiovascular:  Negative for chest pain and leg swelling.   Gastrointestinal:  Positive for nausea and vomiting. Negative for abdominal pain and diarrhea.   Endocrine: Negative.    Genitourinary:  Negative for decreased urine volume and dysuria.   Musculoskeletal:  Negative for neck pain.   Skin:  Negative for rash and wound.   Allergic/Immunologic: Negative.    Neurological:  Negative for weakness, numbness and headaches.   Hematological: Negative.    Psychiatric/Behavioral: Negative.     All other systems reviewed and are negative.         PHYSICAL EXAM  ED Triage Vitals [05/04/24 0416]   Temp Heart Rate Resp BP SpO2   95.7 °F (35.4 °C) 103 20 -- 98 %      Temp src Heart Rate Source Patient Position BP Location FiO2 (%)   Tympanic Monitor -- -- --       Physical Exam  Constitutional:       General: He is not in acute distress.     Appearance: Normal appearance. He is not ill-appearing or toxic-appearing.   HENT:      Head: Normocephalic and atraumatic.   Eyes:      Extraocular Movements: Extraocular movements intact.      Pupils: Pupils are equal, round, and reactive to light.      Comments: Bilateral pinpoint pupils   Cardiovascular:      Rate and Rhythm: Normal rate and regular rhythm.      Heart sounds: No murmur heard.     No friction rub. No gallop.   Pulmonary:      Effort: Pulmonary effort is normal.      Breath sounds: Normal breath sounds.   Abdominal:      General: Abdomen is flat. There is no distension.       Palpations: Abdomen is soft.      Tenderness: There is no abdominal tenderness.   Musculoskeletal:         General: No swelling or tenderness. Normal range of motion.      Cervical back: Normal range of motion and neck supple.   Skin:     General: Skin is warm and dry.   Neurological:      General: No focal deficit present.      Mental Status: He is alert and oriented to person, place, and time.      Sensory: No sensory deficit.      Motor: No weakness.   Psychiatric:         Mood and Affect: Mood normal.         Behavior: Behavior normal.           Vital signs and nursing notes reviewed.          LAB RESULTS  No results found for this or any previous visit (from the past 24 hour(s)).    Ordered the above labs and reviewed the results.        RADIOLOGY  No Radiology Exams Resulted Within Past 24 Hours    Ordered the above noted radiological studies. Reviewed by me in PACS.            PROCEDURES  Procedures            MEDICATIONS GIVEN IN ER  Medications - No data to display                MEDICAL DECISION MAKING, PROGRESS, and CONSULTS    All labs have been independently reviewed by me.  All radiology studies have been reviewed by me and I have also reviewed the radiology report.   EKG's independently viewed and interpreted by me.  Discussion below represents my analysis of pertinent findings related to patient's condition, differential diagnosis, treatment plan and final disposition.      Additional sources:  - Discussed/ obtained information from independent historians: History obtained from the patient as well as the patient's family at bedside.    - External (non-ED) record review: Upon medical records review, the patient was admitted to the hospital last from 4/16/2024 through 4/19/2024 secondary to a drug overdose as well as for treatment of community-acquired pneumonia.    - Chronic or social conditions impacting care: Substance abuse    - Shared decision making: Discharge decision based on shared  conversations have between myself as well as the patient and the patient's family at bedside.      Orders placed during this visit:  Orders Placed This Encounter   Procedures    Urine Drug Screen - Urine, Clean Catch         Differential diagnosis includes but is not limited to:    Opiate overdose, benzodiazepine overdose, polysubstance abuse/misuse, or alcohol intoxication      Independent interpretation of labs, radiology studies, and discussions with consultants:  ED Course as of 05/04/24 0651   Sat May 04, 2024   0649 On reevaluation, the patient has been resting comfortably sleeping throughout the entire ED stay but stable vital signs with normal oxygen saturations.  I informed the patient's family that with the stable vital signs now for over 2.5 hours that he should be stable for discharge to home.  He has been strongly encouraged to not use medications that are not prescribed to him.  They understand and all questions answered. [BM]      ED Course User Index  [BM] Maco Laurent MD         DIAGNOSIS  Final diagnoses:   Opioid overdose, accidental or unintentional, initial encounter         DISPOSITION  DISCHARGE    Patient discharged in stable condition.    Reviewed implications of results, diagnosis, meds, responsibility to follow up, warning signs and symptoms of possible worsening, potential complications and reasons to return to ER.    Patient/Family voiced understanding of above instructions.    Discussed plan for discharge, as there is no emergent indication for admission. Patient referred to primary care provider for BP management due to today's BP. Pt/family is agreeable and understands need for follow up and repeat testing.  Pt is aware that discharge does not mean that nothing is wrong but it indicates no emergency is present that requires admission and they must continue care with follow-up as given below or physician of their choice.     FOLLOW-UP  [facility]  REFERRAL SERVICE  [facility] [address]  [phone]  Schedule an appointment as soon as possible for a visit            Medication List      No changes were made to your prescriptions during this visit.                   Latest Documented Vital Signs:  As of 06:51 EDT  BP- 109/77 HR- 78 Temp- 95.7 °F (35.4 °C) (Tympanic) O2 sat- 95%              --    Please note that portions of this were completed with a voice recognition program.       Note Disclaimer: At [facility], we believe that sharing information builds trust and better relationships. You are receiving this note because you are receiving care at [facility] or recently visited. It is possible you will see health information before a provider has talked with you about it. This kind of information can be easy to misunderstand. To help you fully understand what it means for your health, we urge you to discuss this note with your provider.             Maco Laurent MD  05/04/24 0640

## 2024-05-14 ENCOUNTER — APPOINTMENT (OUTPATIENT)
Dept: CT IMAGING | Facility: HOSPITAL | Age: 34
DRG: 917 | End: 2024-05-14
Payer: MEDICAID

## 2024-05-14 ENCOUNTER — HOSPITAL ENCOUNTER (INPATIENT)
Facility: HOSPITAL | Age: 34
LOS: 3 days | Discharge: PSYCHIATRIC OR SUBSTANCE USE FACILITY | DRG: 917 | End: 2024-05-17
Attending: EMERGENCY MEDICINE | Admitting: INTERNAL MEDICINE
Payer: MEDICAID

## 2024-05-14 DIAGNOSIS — F19.11 HISTORY OF SUBSTANCE ABUSE: ICD-10-CM

## 2024-05-14 DIAGNOSIS — R41.82 ALTERED MENTAL STATUS, UNSPECIFIED ALTERED MENTAL STATUS TYPE: Primary | ICD-10-CM

## 2024-05-14 PROBLEM — E87.20 ACIDOSIS: Status: ACTIVE | Noted: 2024-05-14

## 2024-05-14 PROBLEM — F19.10 POLYSUBSTANCE ABUSE: Status: ACTIVE | Noted: 2024-05-14

## 2024-05-14 PROBLEM — R06.89 HYPERCARBIA: Status: ACTIVE | Noted: 2024-05-14

## 2024-05-14 LAB
ALBUMIN SERPL-MCNC: 4.2 G/DL (ref 3.5–5.2)
ALBUMIN/GLOB SERPL: 2.2 G/DL
ALP SERPL-CCNC: 56 U/L (ref 39–117)
ALT SERPL W P-5'-P-CCNC: 17 U/L (ref 1–41)
AMPHET+METHAMPHET UR QL: POSITIVE
ANION GAP SERPL CALCULATED.3IONS-SCNC: 9 MMOL/L (ref 5–15)
APAP SERPL-MCNC: <5 MCG/ML (ref 0–30)
ARTERIAL PATENCY WRIST A: POSITIVE
AST SERPL-CCNC: 25 U/L (ref 1–40)
ATMOSPHERIC PRESS: 738.6 MMHG
ATMOSPHERIC PRESS: 743.4 MMHG
BARBITURATES UR QL SCN: NEGATIVE
BASE EXCESS BLDA CALC-SCNC: -0.8 MMOL/L (ref 0–2)
BASE EXCESS BLDV CALC-SCNC: -2 MMOL/L (ref -2–2)
BASOPHILS # BLD AUTO: 0.02 10*3/MM3 (ref 0–0.2)
BASOPHILS NFR BLD AUTO: 0.4 % (ref 0–1.5)
BDY SITE: ABNORMAL
BENZODIAZ UR QL SCN: POSITIVE
BILIRUB SERPL-MCNC: 0.6 MG/DL (ref 0–1.2)
BUN BLDA-MCNC: 7 MG/DL
BUN SERPL-MCNC: 11 MG/DL (ref 6–20)
BUN/CREAT SERPL: 11.2 (ref 7–25)
CA-I BLDA-SCNC: 1.22 MMOL/L (ref 2.15–2.5)
CALCIUM SPEC-SCNC: 8.5 MG/DL (ref 8.6–10.5)
CANNABINOIDS SERPL QL: POSITIVE
CHLORIDE BLDA-SCNC: 108 MMOL/L (ref 98–107)
CHLORIDE SERPL-SCNC: 107 MMOL/L (ref 98–107)
CO2 BLDA-SCNC: 26.7 MMOL/L (ref 23–27)
CO2 BLDA-SCNC: 27.1 MMOL/L (ref 23–27)
CO2 SERPL-SCNC: 27 MMOL/L (ref 22–29)
COCAINE UR QL: NEGATIVE
CREAT BLDA-MCNC: 0.81 MG/DL (ref 0.6–130)
CREAT SERPL-MCNC: 0.98 MG/DL (ref 0.76–1.27)
D-LACTATE SERPL-SCNC: 0.5 MMOL/L (ref 0.5–2)
DEPRECATED RDW RBC AUTO: 41.6 FL (ref 37–54)
EGFRCR SERPLBLD CKD-EPI 2021: 103.8 ML/MIN/1.73
EOSINOPHIL # BLD AUTO: 0.13 10*3/MM3 (ref 0–0.4)
EOSINOPHIL NFR BLD AUTO: 2.6 % (ref 0.3–6.2)
ERYTHROCYTE [DISTWIDTH] IN BLOOD BY AUTOMATED COUNT: 12.4 % (ref 12.3–15.4)
ETHANOL BLD-MCNC: <10 MG/DL (ref 0–10)
ETHANOL UR QL: <0.01 %
FENTANYL UR-MCNC: POSITIVE NG/ML
GAS FLOW AIRWAY: 3 LPM
GLOBULIN UR ELPH-MCNC: 1.9 GM/DL
GLUCOSE BLDC GLUCOMTR-MCNC: 88 MG/DL (ref 70–130)
GLUCOSE BLDC GLUCOMTR-MCNC: 94 MG/DL (ref 70–130)
GLUCOSE SERPL-MCNC: 78 MG/DL (ref 65–99)
HCO3 BLDA-SCNC: 26.6 MMOL/L (ref 22–28)
HCO3 BLDV-SCNC: 25.1 MMOL/L (ref 22–28)
HCT VFR BLD AUTO: 39.2 % (ref 37.5–51)
HCT VFR BLDA CALC: 38 % (ref 38–51)
HEMODILUTION: NO
HGB BLD-MCNC: 12.7 G/DL (ref 13–17.7)
HGB BLDA-MCNC: 12.8 G/DL (ref 12–17)
IMM GRANULOCYTES # BLD AUTO: 0.01 10*3/MM3 (ref 0–0.05)
IMM GRANULOCYTES NFR BLD AUTO: 0.2 % (ref 0–0.5)
LITHIUM SERPL-SCNC: <0.1 MMOL/L (ref 0.6–1.2)
LYMPHOCYTES # BLD AUTO: 2.37 10*3/MM3 (ref 0.7–3.1)
LYMPHOCYTES NFR BLD AUTO: 47.9 % (ref 19.6–45.3)
Lab: ABNORMAL
MCH RBC QN AUTO: 29.7 PG (ref 26.6–33)
MCHC RBC AUTO-ENTMCNC: 32.4 G/DL (ref 31.5–35.7)
MCV RBC AUTO: 91.6 FL (ref 79–97)
METHADONE UR QL SCN: NEGATIVE
MODALITY: ABNORMAL
MODALITY: ABNORMAL
MONOCYTES # BLD AUTO: 0.45 10*3/MM3 (ref 0.1–0.9)
MONOCYTES NFR BLD AUTO: 9.1 % (ref 5–12)
NEUTROPHILS NFR BLD AUTO: 1.97 10*3/MM3 (ref 1.7–7)
NEUTROPHILS NFR BLD AUTO: 39.8 % (ref 42.7–76)
NOTIFIED WHO: ABNORMAL
NOTIFIED WHO: ABNORMAL
NRBC BLD AUTO-RTO: 0 /100 WBC (ref 0–0.2)
OPIATES UR QL: NEGATIVE
OXYCODONE UR QL SCN: NEGATIVE
PCO2 BLDA: 55.1 MM HG (ref 35–45)
PCO2 BLDV: 51.8 MM HG (ref 41–51)
PH BLDA: 7.29 PH UNITS (ref 7.35–7.45)
PH BLDV: 7.29 PH UNITS (ref 7.31–7.41)
PLATELET # BLD AUTO: 178 10*3/MM3 (ref 140–450)
PMV BLD AUTO: 11 FL (ref 6–12)
PO2 BLDA: 66.1 MM HG (ref 80–100)
PO2 BLDV: 59.6 MM HG (ref 35–45)
POTASSIUM BLDA-SCNC: 3.7 MMOL/L (ref 3.5–5.2)
POTASSIUM SERPL-SCNC: 4.3 MMOL/L (ref 3.5–5.2)
PROT SERPL-MCNC: 6.1 G/DL (ref 6–8.5)
RBC # BLD AUTO: 4.28 10*6/MM3 (ref 4.14–5.8)
READ BACK: YES
READ BACK: YES
SALICYLATES SERPL-MCNC: <0.3 MG/DL
SAO2 % BLDCOA: 89.9 % (ref 92–98.5)
SAO2 % BLDCOV: 87 % (ref 45–75)
SODIUM BLD-SCNC: 143 MMOL/L (ref 136–145)
SODIUM SERPL-SCNC: 143 MMOL/L (ref 136–145)
TOTAL RATE: 18 BREATHS/MINUTE
TOTAL RATE: 18 BREATHS/MINUTE
WBC NRBC COR # BLD AUTO: 4.95 10*3/MM3 (ref 3.4–10.8)

## 2024-05-14 PROCEDURE — 80307 DRUG TEST PRSMV CHEM ANLYZR: CPT | Performed by: EMERGENCY MEDICINE

## 2024-05-14 PROCEDURE — 94660 CPAP INITIATION&MGMT: CPT

## 2024-05-14 PROCEDURE — 99285 EMERGENCY DEPT VISIT HI MDM: CPT

## 2024-05-14 PROCEDURE — 94760 N-INVAS EAR/PLS OXIMETRY 1: CPT

## 2024-05-14 PROCEDURE — 25810000003 SODIUM CHLORIDE 0.9 % SOLUTION: Performed by: EMERGENCY MEDICINE

## 2024-05-14 PROCEDURE — 83605 ASSAY OF LACTIC ACID: CPT

## 2024-05-14 PROCEDURE — 25010000002 ENOXAPARIN PER 10 MG: Performed by: INTERNAL MEDICINE

## 2024-05-14 PROCEDURE — 80053 COMPREHEN METABOLIC PANEL: CPT | Performed by: EMERGENCY MEDICINE

## 2024-05-14 PROCEDURE — 94761 N-INVAS EAR/PLS OXIMETRY MLT: CPT

## 2024-05-14 PROCEDURE — 80143 DRUG ASSAY ACETAMINOPHEN: CPT | Performed by: INTERNAL MEDICINE

## 2024-05-14 PROCEDURE — 85025 COMPLETE CBC W/AUTO DIFF WBC: CPT | Performed by: EMERGENCY MEDICINE

## 2024-05-14 PROCEDURE — 85018 HEMOGLOBIN: CPT

## 2024-05-14 PROCEDURE — 25810000003 SODIUM CHLORIDE 0.9 % SOLUTION: Performed by: INTERNAL MEDICINE

## 2024-05-14 PROCEDURE — 94799 UNLISTED PULMONARY SVC/PX: CPT

## 2024-05-14 PROCEDURE — 80178 ASSAY OF LITHIUM: CPT | Performed by: INTERNAL MEDICINE

## 2024-05-14 PROCEDURE — G0378 HOSPITAL OBSERVATION PER HR: HCPCS

## 2024-05-14 PROCEDURE — 80179 DRUG ASSAY SALICYLATE: CPT | Performed by: INTERNAL MEDICINE

## 2024-05-14 PROCEDURE — 36415 COLL VENOUS BLD VENIPUNCTURE: CPT

## 2024-05-14 PROCEDURE — 80047 BASIC METABLC PNL IONIZED CA: CPT

## 2024-05-14 PROCEDURE — 82077 ASSAY SPEC XCP UR&BREATH IA: CPT | Performed by: EMERGENCY MEDICINE

## 2024-05-14 PROCEDURE — 82803 BLOOD GASES ANY COMBINATION: CPT

## 2024-05-14 PROCEDURE — 82948 REAGENT STRIP/BLOOD GLUCOSE: CPT

## 2024-05-14 PROCEDURE — 36600 WITHDRAWAL OF ARTERIAL BLOOD: CPT

## 2024-05-14 PROCEDURE — 70450 CT HEAD/BRAIN W/O DYE: CPT

## 2024-05-14 RX ORDER — SODIUM CHLORIDE 0.9 % (FLUSH) 0.9 %
10 SYRINGE (ML) INJECTION AS NEEDED
Status: DISCONTINUED | OUTPATIENT
Start: 2024-05-14 | End: 2024-05-17 | Stop reason: HOSPADM

## 2024-05-14 RX ORDER — LITHIUM CARBONATE 300 MG/1
300 TABLET, FILM COATED, EXTENDED RELEASE ORAL 2 TIMES DAILY
COMMUNITY

## 2024-05-14 RX ORDER — AMOXICILLIN 250 MG
2 CAPSULE ORAL 2 TIMES DAILY PRN
Status: DISCONTINUED | OUTPATIENT
Start: 2024-05-14 | End: 2024-05-17 | Stop reason: HOSPADM

## 2024-05-14 RX ORDER — DEXTROAMPHETAMINE SACCHARATE, AMPHETAMINE ASPARTATE, DEXTROAMPHETAMINE SULFATE AND AMPHETAMINE SULFATE 7.5; 7.5; 7.5; 7.5 MG/1; MG/1; MG/1; MG/1
30 TABLET ORAL 2 TIMES DAILY
COMMUNITY
End: 2024-05-17 | Stop reason: HOSPADM

## 2024-05-14 RX ORDER — ONDANSETRON 2 MG/ML
4 INJECTION INTRAMUSCULAR; INTRAVENOUS EVERY 6 HOURS PRN
Status: DISCONTINUED | OUTPATIENT
Start: 2024-05-14 | End: 2024-05-17 | Stop reason: HOSPADM

## 2024-05-14 RX ORDER — LAMOTRIGINE 200 MG/1
200 TABLET ORAL 2 TIMES DAILY
COMMUNITY

## 2024-05-14 RX ORDER — ENOXAPARIN SODIUM 100 MG/ML
40 INJECTION SUBCUTANEOUS EVERY 24 HOURS
Status: DISCONTINUED | OUTPATIENT
Start: 2024-05-14 | End: 2024-05-17 | Stop reason: HOSPADM

## 2024-05-14 RX ORDER — BISACODYL 10 MG
10 SUPPOSITORY, RECTAL RECTAL DAILY PRN
Status: DISCONTINUED | OUTPATIENT
Start: 2024-05-14 | End: 2024-05-17 | Stop reason: HOSPADM

## 2024-05-14 RX ORDER — POLYETHYLENE GLYCOL 3350 17 G/17G
17 POWDER, FOR SOLUTION ORAL DAILY PRN
Status: DISCONTINUED | OUTPATIENT
Start: 2024-05-14 | End: 2024-05-17 | Stop reason: HOSPADM

## 2024-05-14 RX ORDER — BISACODYL 5 MG/1
5 TABLET, DELAYED RELEASE ORAL DAILY PRN
Status: DISCONTINUED | OUTPATIENT
Start: 2024-05-14 | End: 2024-05-17 | Stop reason: HOSPADM

## 2024-05-14 RX ORDER — SODIUM CHLORIDE 0.9 % (FLUSH) 0.9 %
10 SYRINGE (ML) INJECTION EVERY 12 HOURS SCHEDULED
Status: DISCONTINUED | OUTPATIENT
Start: 2024-05-14 | End: 2024-05-17 | Stop reason: HOSPADM

## 2024-05-14 RX ORDER — ALPRAZOLAM 1 MG/1
1 TABLET ORAL DAILY PRN
COMMUNITY
End: 2024-05-17 | Stop reason: HOSPADM

## 2024-05-14 RX ORDER — GUANFACINE 1 MG/1
1 TABLET, EXTENDED RELEASE ORAL DAILY
COMMUNITY
End: 2024-05-17 | Stop reason: HOSPADM

## 2024-05-14 RX ORDER — SODIUM CHLORIDE 9 MG/ML
40 INJECTION, SOLUTION INTRAVENOUS AS NEEDED
Status: DISCONTINUED | OUTPATIENT
Start: 2024-05-14 | End: 2024-05-17 | Stop reason: HOSPADM

## 2024-05-14 RX ORDER — NITROGLYCERIN 0.4 MG/1
0.4 TABLET SUBLINGUAL
Status: DISCONTINUED | OUTPATIENT
Start: 2024-05-14 | End: 2024-05-17 | Stop reason: HOSPADM

## 2024-05-14 RX ORDER — SODIUM CHLORIDE 9 MG/ML
125 INJECTION, SOLUTION INTRAVENOUS CONTINUOUS
Status: DISCONTINUED | OUTPATIENT
Start: 2024-05-14 | End: 2024-05-17 | Stop reason: HOSPADM

## 2024-05-14 RX ADMIN — SODIUM CHLORIDE 125 ML/HR: 9 INJECTION, SOLUTION INTRAVENOUS at 11:17

## 2024-05-14 RX ADMIN — Medication 10 ML: at 11:17

## 2024-05-14 RX ADMIN — ENOXAPARIN SODIUM 40 MG: 100 INJECTION SUBCUTANEOUS at 11:16

## 2024-05-14 RX ADMIN — SODIUM CHLORIDE 125 ML/HR: 9 INJECTION, SOLUTION INTRAVENOUS at 19:26

## 2024-05-14 RX ADMIN — SODIUM CHLORIDE 1000 ML: 9 INJECTION, SOLUTION INTRAVENOUS at 03:54

## 2024-05-14 RX ADMIN — Medication 10 ML: at 21:17

## 2024-05-14 RX ADMIN — SODIUM CHLORIDE 1000 ML: 9 INJECTION, SOLUTION INTRAVENOUS at 08:23

## 2024-05-14 NOTE — H&P
[facility] Admission H&P    Patient Care Team:  Provider, No Known as PCP - General    Chief complaint: Brought to the emergency room by EMS for altered mental status    History of Present Illness    This is a 34-year-old male with well-known history of polysubstance abuse who presented to the emergency room via EMS for altered mental status.  He was given intranasal Narcan followed by IV Narcan and route to the hospital with transient improvement of symptoms.  Here he has remained very somnolent over the past 5 hours and will hardly arouse to intense sternal rubbing.  Prior UDS has revealed amphetamines, fentanyl, benzodiazepines, THC.  UDS this morning is pending.  Patient was accompanied to the hospital by his partner.  She is no longer present and has left a handwritten note for him stating that she is leaving him and taking their child with her due to his inability to get clean.    History reviewed. No pertinent past medical history.  History reviewed. No pertinent surgical history.  History reviewed. No pertinent family history.     (Not in a hospital admission)    Allergies:  Patient has no allergy information on record.    Review of Systems   Unable to perform ROS: Mental status change        PHYSICAL EXAM    Vital Signs  tMax 24 hrs:  Temp (24hrs), Av.8 °F (36.6 °C), Min:97.8 °F (36.6 °C), Max:97.8 °F (36.6 °C)    Vitals Ranges:  Temp:  [97.8 °F (36.6 °C)] 97.8 °F (36.6 °C)  Heart Rate:  [] 91  Resp:  [16] 16  BP: (108-136)/(72-96) 136/91    Physical Exam  Vitals and nursing note reviewed.   Constitutional:       General: He is not in acute distress.     Appearance: He is well-developed. He is not ill-appearing.   HENT:      Head: Normocephalic and atraumatic.      Nose: Nose normal.      Mouth/Throat:      Mouth: Mucous membranes are not dry.   Eyes:      Conjunctiva/sclera: Conjunctivae normal.      Pupils: Pupils are equal, round, and reactive to light.      Comments: Pinpoint pupils   Neck:       Vascular: No JVD.   Cardiovascular:      Rate and Rhythm: Normal rate and regular rhythm.      Heart sounds: No murmur heard.     No gallop.   Pulmonary:      Effort: Pulmonary effort is normal. No accessory muscle usage or respiratory distress.      Breath sounds: No decreased breath sounds or wheezing.   Abdominal:      General: Bowel sounds are normal. There is no distension.      Palpations: Abdomen is soft.      Tenderness: There is no abdominal tenderness. There is no guarding or rebound.   Musculoskeletal:         General: No deformity. Normal range of motion.      Cervical back: Normal range of motion and neck supple.   Lymphadenopathy:      Cervical: No cervical adenopathy.   Skin:     General: Skin is warm and dry.      Findings: No erythema or rash.   Neurological:      Mental Status: He is alert.      Comments: Extremely somnolent.  Will make faint intentional movements with sternal rubbing.  ER staff was able to get him to hold up 2 fingers upon command.         Results Review:  Results from last 7 days   Lab Units 05/14/24  0353   WBC 10*3/mm3 4.95   HEMOGLOBIN g/dL 12.7*   HEMATOCRIT % 39.2   PLATELETS 10*3/mm3 178     Results from last 7 days   Lab Units 05/14/24  0353   SODIUM mmol/L 143   POTASSIUM mmol/L 4.3   CHLORIDE mmol/L 107   CO2 mmol/L 27.0   BUN mg/dL 11   CREATININE mg/dL 0.98   CALCIUM mg/dL 8.5*   BILIRUBIN mg/dL 0.6   ALK PHOS U/L 56   ALT (SGPT) U/L 17   AST (SGOT) U/L 25   GLUCOSE mg/dL 78        I reviewed the patient's new clinical results.  I reviewed the patient's new imaging results and agree with the interpretation.        Active Hospital Problems    Diagnosis  POA    **Altered mental status [R41.82]  Yes    Polysubstance abuse [F19.10]  Unknown      Resolved Hospital Problems   No resolved problems to display.       Assessment & Plan    The patient is going to be admitted for prolonged somnolence due to his polysubstance abuse.  UDS is pending however historically he has had  amphetamines, benzodiazepines, fentanyl, and THC in his system.  Head CT was negative and basic labs are unremarkable.  I will check an ABG.  Provide supportive care with IV fluids.  Will await improvement of his mentation and somnolence.  His partner has left him a note stating she is leaving him and taking their child with her.  We will have to keep a close eye for any potential depression or suicidality once he is awake.  Will ask access center to see him.  Additional plans based on his clinical course.    I discussed the patients findings and my recommendations with nursing staff      Isaac Adams MD  05/14/24  09:03 EDT    Addendum: ABG has now been performed showing a pH of 7.29 with pCO2 of 55.  Consulting pulmonology for potential need of BiPAP.

## 2024-05-14 NOTE — NURSING NOTE
Call placed to pulmonology as patient has not been seen. Pt remains on bipap only arousing to sternal rub.

## 2024-05-14 NOTE — PROGRESS NOTES
Access attempted to evaluate pt for drugs and SI. Pt was talking more clearly with nurses earlier and requested to talk with Access. Access went to pt's room and he was sleeping and struggled to wake up to answer questions. The only thing pt told Access was that he didn't know if his overdose was a suicidal act. Pt does have a sitter.Pt was unable to answer anymore questions. Access will return when pt is more awake. Access will request a psychiatrist consult be placed.

## 2024-05-14 NOTE — ED PROVIDER NOTES
EMERGENCY DEPARTMENT ENCOUNTER  Room Number:  S403/1  PCP: Provider, No Known  Independent Historians: EMS      HPI:  Chief Complaint: had concerns including Altered Mental Status.,  Report of overdose    A complete HPI/ROS/PMH/PSH/SH/FH are unobtainable due to: Altered Mental Status and Poor historian    Chronic or social conditions impacting patient care (Social Determinants of Health): Economic Stability (Employment, Income, Expenses, Debt, Medical Bills, Financial Resource Strain) and Community and Social Context (Social Integration, Support Systems, Community Engagement, Intimate Partner Violence, Discrimination, Stress)      Context: Davin Booker is a 34 y.o. male with a medical history of substance abuse who presents to the ED c/o acute overdose after being found down in his home by family members.  Paramedics were called to the house when patient's girlfriend found this patient unresponsive underneath his bed on the floor.  It is unknown how long he had been unresponsive.  Report to EMS was that patient is known to have a history of methamphetamine use, Xanax use and opiate use.  The first responders on the scene were police and fire personnel.  They gave a dose of intranasal Narcan.  Paramedics arrived and gave 1 additional dose of Narcan IV with some clinical improvement in the patient.  Little else is known about the patient's condition on arrival due to his decreased responsiveness.      Review of prior external notes (non-ED) -and- Review of prior external test results outside of this encounter: I independently reviewed the hospital discharge note from April 19, 2024      PAST MEDICAL HISTORY  Active Ambulatory Problems     Diagnosis Date Noted    No Active Ambulatory Problems     Resolved Ambulatory Problems     Diagnosis Date Noted    No Resolved Ambulatory Problems     No Additional Past Medical History         PAST SURGICAL HISTORY  History reviewed. No pertinent surgical history.      FAMILY  HISTORY  History reviewed. No pertinent family history.      SOCIAL HISTORY  Social History     Socioeconomic History    Marital status:          ALLERGIES  Patient has no allergy information on record.      REVIEW OF SYSTEMS  Review of Systems  Included in HPI  All systems reviewed and negative except for those discussed in HPI.      PHYSICAL EXAM    I have reviewed the triage vital signs and nursing notes.    ED Triage Vitals [05/14/24 0342]   Temp Heart Rate Resp BP SpO2   97.8 °F (36.6 °C) 94 16 133/96 98 %      Temp src Heart Rate Source Patient Position BP Location FiO2 (%)   Tympanic Monitor Sitting Right arm --       Physical Exam  GENERAL: Decreased level of consciousness is observed.  Patient is disheveled.  SKIN: Warm, dry  HENT: Normocephalic, atraumatic, moist mucous membranes  EYES: no scleral icterus, normal conjunctiva, pupils are 2 to 3 mm bilaterally and equal.  CV: regular rhythm, regular rate  RESPIRATORY: normal effort, lungs clear bilaterally  ABDOMEN: soft, nondistended, nontender, no masses  MUSCULOSKELETAL: no deformity, no asymmetry to the extremities  NEURO: alert, moves all extremities, follows commands      LAB RESULTS  Recent Results (from the past 24 hour(s))   POC Glucose Once    Collection Time: 05/14/24 10:55 AM    Specimen: Blood   Result Value Ref Range    Glucose 88 70 - 130 mg/dL   Salicylate Level    Collection Time: 05/14/24  3:10 PM    Specimen: Blood   Result Value Ref Range    Salicylate <0.3 <=30.0 mg/dL   Acetaminophen Level    Collection Time: 05/14/24  3:10 PM    Specimen: Blood   Result Value Ref Range    Acetaminophen <5.0 0.0 - 30.0 mcg/mL   Lithium Level    Collection Time: 05/14/24  3:10 PM    Specimen: Blood   Result Value Ref Range    Lithium <0.1 (L) 0.6 - 1.2 mmol/L   Blood Gas, Venous -    Collection Time: 05/14/24  7:34 PM    Specimen: Venous Blood   Result Value Ref Range    pH, Venous 7.294 (L) 7.310 - 7.410 pH Units    pCO2, Venous 51.8 (H) 41.0 -  51.0 mm Hg    pO2, Venous 59.6 (H) 35.0 - 45.0 mm Hg    HCO3, Venous 25.1 22.0 - 28.0 mmol/L    Base Excess, Venous -2.0 -2.0 - 2.0 mmol/L    O2 Saturation, Venous 87.0 (H) 45.0 - 75.0 %    CO2 Content 26.7 23 - 27 mmol/L    Barometric Pressure for Blood Gas 738.6000 mmHg    Modality Cannula     Flow Rate 3.0000 lpm    Rate 18 Breaths/minute   Basic Metabolic Panel    Collection Time: 05/15/24  7:26 AM    Specimen: Blood   Result Value Ref Range    Glucose 87 65 - 99 mg/dL    BUN 3 (L) 6 - 20 mg/dL    Creatinine 0.74 (L) 0.76 - 1.27 mg/dL    Sodium 141 136 - 145 mmol/L    Potassium 3.7 3.5 - 5.2 mmol/L    Chloride 108 (H) 98 - 107 mmol/L    CO2 24.4 22.0 - 29.0 mmol/L    Calcium 8.1 (L) 8.6 - 10.5 mg/dL    BUN/Creatinine Ratio 4.1 (L) 7.0 - 25.0    Anion Gap 8.6 5.0 - 15.0 mmol/L    eGFR 121.9 >60.0 mL/min/1.73   CBC Auto Differential    Collection Time: 05/15/24  7:26 AM    Specimen: Blood   Result Value Ref Range    WBC 6.00 3.40 - 10.80 10*3/mm3    RBC 4.37 4.14 - 5.80 10*6/mm3    Hemoglobin 12.9 (L) 13.0 - 17.7 g/dL    Hematocrit 38.3 37.5 - 51.0 %    MCV 87.6 79.0 - 97.0 fL    MCH 29.5 26.6 - 33.0 pg    MCHC 33.7 31.5 - 35.7 g/dL    RDW 11.8 (L) 12.3 - 15.4 %    RDW-SD 38.1 37.0 - 54.0 fl    MPV 11.0 6.0 - 12.0 fL    Platelets 159 140 - 450 10*3/mm3    Neutrophil % 55.2 42.7 - 76.0 %    Lymphocyte % 35.5 19.6 - 45.3 %    Monocyte % 6.8 5.0 - 12.0 %    Eosinophil % 2.0 0.3 - 6.2 %    Basophil % 0.2 0.0 - 1.5 %    Immature Grans % 0.3 0.0 - 0.5 %    Neutrophils, Absolute 3.31 1.70 - 7.00 10*3/mm3    Lymphocytes, Absolute 2.13 0.70 - 3.10 10*3/mm3    Monocytes, Absolute 0.41 0.10 - 0.90 10*3/mm3    Eosinophils, Absolute 0.12 0.00 - 0.40 10*3/mm3    Basophils, Absolute 0.01 0.00 - 0.20 10*3/mm3    Immature Grans, Absolute 0.02 0.00 - 0.05 10*3/mm3    nRBC 0.0 0.0 - 0.2 /100 WBC         RADIOLOGY  No Radiology Exams Resulted Within Past 24 Hours      MEDICATIONS GIVEN IN ER  Medications   sodium chloride 0.9 % flush  10 mL (has no administration in time range)   sodium chloride 0.9 % flush 10 mL (10 mL Intravenous Not Given 5/15/24 0803)   sodium chloride 0.9 % flush 10 mL (has no administration in time range)   sodium chloride 0.9 % infusion 40 mL (has no administration in time range)   nitroglycerin (NITROSTAT) SL tablet 0.4 mg (has no administration in time range)   sennosides-docusate (PERICOLACE) 8.6-50 MG per tablet 2 tablet (has no administration in time range)     And   polyethylene glycol (MIRALAX) packet 17 g (has no administration in time range)     And   bisacodyl (DULCOLAX) EC tablet 5 mg (has no administration in time range)     And   bisacodyl (DULCOLAX) suppository 10 mg (has no administration in time range)   ondansetron (ZOFRAN) injection 4 mg (has no administration in time range)   sodium chloride 0.9 % infusion (125 mL/hr Intravenous Currently Infusing 5/15/24 0954)   Enoxaparin Sodium (LOVENOX) syringe 40 mg (40 mg Subcutaneous Not Given 5/15/24 0957)   sodium chloride 0.9 % bolus 1,000 mL (0 mL Intravenous Stopped 5/14/24 0447)   sodium chloride 0.9 % bolus 1,000 mL (0 mL Intravenous Stopped 5/14/24 0950)         ORDERS PLACED DURING THIS VISIT:  Orders Placed This Encounter   Procedures    CT Head Without Contrast    XR Chest PA & Lateral    Comprehensive Metabolic Panel    Ethanol    Urine Drug Screen - Urine, Clean Catch    CBC Auto Differential    Blood Gas, Arterial -    Basic Metabolic Panel    Blood Gas, Arterial -    Salicylate Level    Acetaminophen Level    Lithium Level    Blood Gas, Venous -    Blood Gas, Venous -    CBC Auto Differential    Blood Gas, Venous -    Diet: Regular/House; Safe Tray; Fluid Consistency: Thin (IDDSI 0)    Monitor Blood Pressure    Continuous Pulse Oximetry    Ambulate patient    Attempt breakfast...  Misc Nursing Order (Specify)    Vital Signs    Ambulate Patient    Up in Chair    Intake & Output    Weigh patient    Oral Care    Maintain IV Access    Telemetry - Place  Orders & Notify Provider of Results When Patient Experiences Acute Chest Pain, Dysrhythmia or Respiratory Distress    May Be Off Telemetry for Tests    Once patient is awake and following commands can initiate clear liquids and advance as tolerated thereafter  Nursing Communication    Code Status and Medical Interventions:    LHYAJAIRA (on-call MD unless specified) Details    Inpatient Access Center Consult    Inpatient Pulmonology Consult    Inpatient Psychiatrist Consult    Respiratory communication    NIPPV (CPAP or BIPAP)    POC Lactate    POC Chem Panel    POC H&H    POC Glucose Once    Telemetry Scan    Telemetry Scan    Telemetry Scan    Insert Peripheral IV    Insert Peripheral IV    Initiate Observation Status    Legal Status 72 Hour Hold    Suicide Precautions    Sitter At Bedside    CBC & Differential    CBC & Differential         OUTPATIENT MEDICATION MANAGEMENT:  Current Facility-Administered Medications Ordered in Epic   Medication Dose Route Frequency Provider Last Rate Last Admin    sennosides-docusate (PERICOLACE) 8.6-50 MG per tablet 2 tablet  2 tablet Oral BID PRN Isaac Adams MD        And    polyethylene glycol (MIRALAX) packet 17 g  17 g Oral Daily PRN Isaac Adams MD        And    bisacodyl (DULCOLAX) EC tablet 5 mg  5 mg Oral Daily PRN Isaac Adams MD        And    bisacodyl (DULCOLAX) suppository 10 mg  10 mg Rectal Daily PRN Isaac Adams MD        Enoxaparin Sodium (LOVENOX) syringe 40 mg  40 mg Subcutaneous Q24H Isaac Adams MD   40 mg at 05/14/24 1116    nitroglycerin (NITROSTAT) SL tablet 0.4 mg  0.4 mg Sublingual Q5 Min PRN Isaac Adams MD        ondansetron (ZOFRAN) injection 4 mg  4 mg Intravenous Q6H PRN Isaac Adams MD        sodium chloride 0.9 % flush 10 mL  10 mL Intravenous PRN Wesley Barney MD        sodium chloride 0.9 % flush 10 mL  10 mL Intravenous Q12H Isaac Adams  MD   10 mL at 05/14/24 2117    sodium chloride 0.9 % flush 10 mL  10 mL Intravenous PRN Isaac Adams MD        sodium chloride 0.9 % infusion 40 mL  40 mL Intravenous PRN Isaac Adams MD        sodium chloride 0.9 % infusion  125 mL/hr Intravenous Continuous Isaac Adams  mL/hr at 05/15/24 0954 125 mL/hr at 05/15/24 0954     No current [facility]-ordered outpatient medications on file.         PROCEDURES  Procedures            PROGRESS, DATA ANALYSIS, CONSULTS, AND MEDICAL DECISION MAKING  All labs have been independently interpreted by me.  All radiology studies have been reviewed by me. All EKG's have been independently viewed and interpreted by me.  Discussion below represents my analysis of pertinent findings related to patient's condition, differential diagnosis, treatment plan and final disposition.    Differential diagnosis includes but is not limited to opiate overdose, alcohol intoxication, benzodiazepine overdose, hypoglycemia, seizure, stroke.    Clinical Scores:                   ED Course as of 05/15/24 1054   Tue May 14, 2024   0354 Currently patient will arouse to verbal and tactile stimulation.  He is protecting his airway sufficiently and maintaining saturations of 98% on room air.  No need for further Narcan intervention at this time.  Will continue to watch him carefully and monitor his vital signs.  Starting some IV fluids as well.  Clinical suspicion is a polysubstance abuse overdose. [JOEY]   0435 I reassessed the patient and he is resting comfortably.  Saturations are 97% on room air.  Will continue to observe. [JOEY]   0436 Ethanol: <10 [JOEY]   0436 Hemoglobin(!): 12.7 [JOEY]   0436 Hematocrit: 39.2 [JOEY]   0436 Creatinine: 0.98 [JOEY]   0646 I reassessed the patient.  He remains hemodynamically stable.  He will arouse to vigorous stimulation but is still depressed and his level of alertness.  He certainly is not ready for discharge at this time.  Patient's  girlfriend did come to the ED briefly to visit with him and she left him a note.  I will plan to transition care to the oncoming physician at shift change.  Patient is recovered and back to normal baseline alertness we may offer treatment resources or access consult for him if he is interested in those options.  For now, continuing to provide supportive care. [JOEY]   0700 Ordering a head CT at this time as well.  I discussed with Dr. Bazzi who is the oncoming physician and he will follow-up on the head CT report and make final disposition plans for the patient as appropriate. [JOEY]   0753 RADIOLOGY      Study: Non contrast CT head  Findings: No large volume intracranial hemorrhage appreciated  I independently viewed and interpreted these images contemporaneously with treatment.    [RS]   0812 Patient continues to rest quite sonorously.  At this point, he has been in the emergency department just shy of 5 hours with persistent altered mental status.  Plan admission for further evaluation and treatment. [RS]   0847 CONSULT        Provider: Dr. Reyna - [facility]    Discussion: Reviewed patient history, ED presentation and evaluation.  Agreeable to accept patient for observation admission with telemetry for further evaluation and treatment.    Agreeable c treatment and planned disposition.         [RS]      ED Course User Index  [JOEY] Wesley Barney MD  [RS] Leonardo Bazzi MD             AS OF 10:54 EDT VITALS:    BP - 141/96  HR - 97  TEMP - 97.3 °F (36.3 °C) (Axillary)  O2 SATS - 99%    COMPLEXITY OF CARE  The patient requires admission.      DIAGNOSIS  Final diagnoses:   Altered mental status, unspecified altered mental status type   History of substance abuse         DISPOSITION  ED Disposition       ED Disposition   Decision to Admit    Condition   --    Comment   Level of Care: Telemetry [5]   Diagnosis: Altered mental status [780.97.ICD-9-CM]   Admitting Physician: PRAVEEN REYNA [[id number]]                   Please note that portions of this document were completed with a voice recognition program.    Note Disclaimer: At [facility], we believe that sharing information builds trust and better relationships. You are receiving this note because you recently visited [facility]. It is possible you will see health information before a provider has talked with you about it. This kind of information can be easy to misunderstand. To help you fully understand what it means for your health, we urge you to discuss this note with your provider.         Wesley Barney MD  05/15/24 2876

## 2024-05-14 NOTE — ED PROVIDER NOTES
Brief history of present illness: 34-year-old male with a history of polysubstance abuse found down by family was reportedly transiently responsive to Narcan in route.  Awaiting sobriety here in the emergency department for disposition.       Physical Exam   Constitutional: No distress.  Nontoxic.  Disheveled.  Heavy sleeper.  Awakes with noxious stimuli and follows simple commands.  HENT:  Head: Normocephalic and atraumatic.   Oropharynx: Mucous membranes are moist.   Eyes: . No scleral icterus. No conjunctival pallor.  Neck: Normal range of motion. Neck supple.   Cardiovascular: Pink warm and well perfused throughout.    Pulmonary/Chest: No respiratory distress.  No tachypnea or increased work of breathing appreciated.    Abdominal: Soft. There is no tenderness. There is no rebound and no guarding.   Musculoskeletal: Moves all extremities equally.    Neurological: Very sedated.  Skin: Skin is pink, warm, and dry.   Psychiatric: Able to assess  Nursing note and vitals reviewed.      MDM:   Results for orders placed or performed during the hospital encounter of 05/14/24   Comprehensive Metabolic Panel    Specimen: Blood   Result Value Ref Range    Glucose 78 65 - 99 mg/dL    BUN 11 6 - 20 mg/dL    Creatinine 0.98 0.76 - 1.27 mg/dL    Sodium 143 136 - 145 mmol/L    Potassium 4.3 3.5 - 5.2 mmol/L    Chloride 107 98 - 107 mmol/L    CO2 27.0 22.0 - 29.0 mmol/L    Calcium 8.5 (L) 8.6 - 10.5 mg/dL    Total Protein 6.1 6.0 - 8.5 g/dL    Albumin 4.2 3.5 - 5.2 g/dL    ALT (SGPT) 17 1 - 41 U/L    AST (SGOT) 25 1 - 40 U/L    Alkaline Phosphatase 56 39 - 117 U/L    Total Bilirubin 0.6 0.0 - 1.2 mg/dL    Globulin 1.9 gm/dL    A/G Ratio 2.2 g/dL    BUN/Creatinine Ratio 11.2 7.0 - 25.0    Anion Gap 9.0 5.0 - 15.0 mmol/L    eGFR 103.8 >60.0 mL/min/1.73   Ethanol    Specimen: Blood   Result Value Ref Range    Ethanol <10 0 - 10 mg/dL    Ethanol % <0.010 %   CBC Auto Differential    Specimen: Blood   Result Value Ref Range    WBC  4.95 3.40 - 10.80 10*3/mm3    RBC 4.28 4.14 - 5.80 10*6/mm3    Hemoglobin 12.7 (L) 13.0 - 17.7 g/dL    Hematocrit 39.2 37.5 - 51.0 %    MCV 91.6 79.0 - 97.0 fL    MCH 29.7 26.6 - 33.0 pg    MCHC 32.4 31.5 - 35.7 g/dL    RDW 12.4 12.3 - 15.4 %    RDW-SD 41.6 37.0 - 54.0 fl    MPV 11.0 6.0 - 12.0 fL    Platelets 178 140 - 450 10*3/mm3    Neutrophil % 39.8 (L) 42.7 - 76.0 %    Lymphocyte % 47.9 (H) 19.6 - 45.3 %    Monocyte % 9.1 5.0 - 12.0 %    Eosinophil % 2.6 0.3 - 6.2 %    Basophil % 0.4 0.0 - 1.5 %    Immature Grans % 0.2 0.0 - 0.5 %    Neutrophils, Absolute 1.97 1.70 - 7.00 10*3/mm3    Lymphocytes, Absolute 2.37 0.70 - 3.10 10*3/mm3    Monocytes, Absolute 0.45 0.10 - 0.90 10*3/mm3    Eosinophils, Absolute 0.13 0.00 - 0.40 10*3/mm3    Basophils, Absolute 0.02 0.00 - 0.20 10*3/mm3    Immature Grans, Absolute 0.01 0.00 - 0.05 10*3/mm3    nRBC 0.0 0.0 - 0.2 /100 WBC       CT Head Without Contrast    Result Date: 5/14/2024  Narrative: CT HEAD WITHOUT CONTRAST  HISTORY: Altered mental status, found down.  COMPARISON: None.  FINDINGS: The brain and ventricles are symmetrical. There is no evidence of hemorrhage, hydrocephalus, abnormal extra-axial fluid or of a focal area of decreased attenuation to suggest acute infarction. The gray-white demarcation is maintained. Bone windows showed no evidence of a calvarial fracture. Further evaluation could be performed with a MRI examination of the brain as indicated.  Radiation dose reduction techniques were utilized, including automated exposure control and exposure modulation based on body size.         My differential diagnosis for altered mental status includes but is not limited to:  Hypoglycemia, hyperglycemia, DKA, overdose, ethanol intoxication, thiamine deficiency, niacin deficiency, hypothymia, hyperviscosity, Lane’s disease, hyponatremia, hypernatremia, liver failure, kidney failure, hyper or hypothyroid, no insufficiency, hypoxia, hypercarbia, carbon monoxide  poisoning, postanoxic encephalopathy, ischemic stroke, intracranial bleed, subarachnoid hemorrhage, brain tumor, closed head injury, epidural hematoma, epidural hematoma, seizure activity, postictal state, syncopal episode, disseminated encephalomyelitis, central pontine myelinolysis, post cardiac arrest, bacterial meningitis, viral meningitis, fungal meningitis, encephalitis, brain abscess, subdural empyema, hysteria, catatonic state, malingering, hypertensive encephalopathy, vasculitis, TTP, DIC    Total critical care time: Approximately 45 minutes    Due to a high probability of clinically significant, life threatening deterioration, the patient required my highest level of preparedness to intervene emergently and I personally spent this critical care time directly and personally managing the patient. This critical care time included obtaining a history; examining the patient; vital sign monitoring; ordering and review of studies; arranging urgent treatment with development of a management plan; evaluation of patient's response to treatment; frequent reassessment; and, discussions with other providers.    This critical care time was performed to assess and manage the high probability of imminent, life-threatening deterioration that could result in multi-organ failure. It was exclusive of separately billable procedures and treating other patients and teaching time.    Please see MDM section and the rest of the note for further information on patient assessment and treatment.      I have seen and personally evaluated this patient, discussed the case with the treating advanced practice provider, and reviewed their note. I was involved in the medical decision making during the evaluation, testing and disposition planning for this patient.    Progress:  ED Course as of 05/14/24 0849   Tue May 14, 2024   0354 Currently patient will arouse to verbal and tactile stimulation.  He is protecting his airway sufficiently and  maintaining saturations of 98% on room air.  No need for further Narcan intervention at this time.  Will continue to watch him carefully and monitor his vital signs.  Starting some IV fluids as well.  Clinical suspicion is a polysubstance abuse overdose. [JOEY]   0435 I reassessed the patient and he is resting comfortably.  Saturations are 97% on room air.  Will continue to observe. [JOEY]   0436 Ethanol: <10 [JOEY]   0436 Hemoglobin(!): 12.7 [JOEY]   0436 Hematocrit: 39.2 [JOEY]   0436 Creatinine: 0.98 [JOEY]   0646 I reassessed the patient.  He remains hemodynamically stable.  He will arouse to vigorous stimulation but is still depressed and his level of alertness.  He certainly is not ready for discharge at this time.  Patient's girlfriend did come to the ED briefly to visit with him and she left him a note.  I will plan to transition care to the oncoming physician at shift change.  Patient is recovered and back to normal baseline alertness we may offer treatment resources or access consult for him if he is interested in those options.  For now, continuing to provide supportive care. [JOEY]   0700 Ordering a head CT at this time as well.  I discussed with Dr. Bazzi who is the oncoming physician and he will follow-up on the head CT report and make final disposition plans for the patient as appropriate. [JOEY]   0753 RADIOLOGY      Study: Non contrast CT head  Findings: No large volume intracranial hemorrhage appreciated  I independently viewed and interpreted these images contemporaneously with treatment.    [RS]   0812 Patient continues to rest quite sonorously.  At this point, he has been in the emergency department just shy of 5 hours with persistent altered mental status.  Plan admission for further evaluation and treatment. [RS]   0847 CONSULT        Provider: Dr. Adams - [facility]    Discussion: Reviewed patient history, ED presentation and evaluation.  Agreeable to accept patient for observation admission with telemetry for  further evaluation and treatment.    Agreeable c treatment and planned disposition.         [RS]      ED Course User Index  [JOEY] Wesley Barney MD  [RS] Leonardo Bazzi MD         Final diagnoses:   Altered mental status, unspecified altered mental status type   History of substance abuse       Disposition:  ADMISSION    Discussed treatment plan and reason for admission with pt/family and admitting physician.  Pt/family voiced understanding of the plan for admission for further testing/treatment as needed.        Leonardo Bazzi MD  05/14/24 0899

## 2024-05-14 NOTE — PROGRESS NOTES
Clinical Pharmacy Services: Medication History    Davin Booker is a 34 y.o. male presenting to [facility] for   Chief Complaint   Patient presents with    Altered Mental Status       He  has no past medical history on file.    Allergies as of 05/14/2024    (Not on File)       Medication information was obtained from: Pharmacy  Pharmacy and Phone Number:   [facility] [id number] - [place], [place] - [address] AT [address] - [phone] PH - [phone] FX  [address]  [address]  [facility] [address]  Phone: [phone] Fax: [phone]    [id number] - [place], [place] - [address] - [phone] PH - [phone] FX  [address]  [facility] [address]  Phone: [phone] Fax: [phone]    [id number] - [place], [place] - [address] AT [facility] [address] - [phone] PH - [phone] FX  [address]  [facility] [address]  Phone: [phone] Fax: [phone]        Prior to Admission Medications       Prescriptions Last Dose Informant Patient Reported? Taking?    ALPRAZolam (XANAX) 1 MG tablet  Pharmacy Yes Yes    Take 1 tablet by mouth Daily As Needed for Anxiety.    amphetamine-dextroamphetamine (ADDERALL) 30 MG tablet  Pharmacy Yes Yes    Take 1 tablet by mouth 2 (Two) Times a Day.    Cariprazine HCl (Vraylar) 1.5 MG capsule capsule  Pharmacy Yes Yes    Take 1 capsule by mouth Daily.    guanFACINE HCl ER (INTUNIV) 1 MG tablet sustained-release 24 hour tablet  Pharmacy Yes Yes    Take 1 tablet by mouth Daily.    lamoTRIgine (LaMICtal) 200 MG tablet  Pharmacy Yes Yes    Take 1 tablet by mouth 2 (Two) Times a Day.    lithium (LITHOBID) 300 MG CR tablet  Pharmacy Yes Yes    Take 1 tablet by mouth 2 (Two) Times a Day.              Medication notes: Unable to discuss medication with patient do to clinical status. Medication list was compiled from fill history of various  pharmacies via [employer].     This medication list is complete to the best of my knowledge as of 5/14/2024    Please call if questions.    Pelon Espinoza  Medication History Technician  [phone]    5/14/2024 09:04 EDT

## 2024-05-14 NOTE — NURSING NOTE
Primary RN, Jose David. Patient arrived to 4S at this time. RT at bedside, patient being placed on Bipap. Pt baseline since admission is alert to vigorous sternal rub. VSS. Remains lethargic.

## 2024-05-14 NOTE — CONSULTS
Referring Provider: Dr. Adams  Reason for Consultation: Respiratory failure    Patient Care Team:  Provider, No Known as PCP - General    Chief complaint:   Altered mental status    History of present illness:  Subjective   This is a 34-year-old male patient with history of polysubstance abuse who presented to the hospital today for altered mental status.  He received Narcan x 2 by EMS, nasally and IV with improvement in his mental status but he remains significantly altered.  UDS was positive for amphetamine/meth, benzodiazepine, fentanyl and THC.  ABG 7.29//55/66 on room air.    Patient was placed on NIPPV following his ABG result.  Apparently he was arousable only to painful stimulus but he seems to be doing better now and he is much more alert.    Patient vapes but does not smoke tobacco.    Review of Systems  Cannot obtain due to altered mental status.    History  Cannot obtain.  Patient is altered.    History reviewed. No pertinent past medical history., History reviewed. No pertinent surgical history., History reviewed. No pertinent family history.,   Social History     Socioeconomic History    Marital status:      E-cigarette/Vaping     E-cigarette/Vaping Substances     E-cigarette/Vaping Devices         ,   Medications Prior to Admission   Medication Sig Dispense Refill Last Dose    ALPRAZolam (XANAX) 1 MG tablet Take 1 tablet by mouth Daily As Needed for Anxiety.       amphetamine-dextroamphetamine (ADDERALL) 30 MG tablet Take 1 tablet by mouth 2 (Two) Times a Day.       Cariprazine HCl (Vraylar) 1.5 MG capsule capsule Take 1 capsule by mouth Daily.       guanFACINE HCl ER (INTUNIV) 1 MG tablet sustained-release 24 hour tablet Take 1 tablet by mouth Daily.       lamoTRIgine (LaMICtal) 200 MG tablet Take 1 tablet by mouth 2 (Two) Times a Day.       lithium (LITHOBID) 300 MG CR tablet Take 1 tablet by mouth 2 (Two) Times a Day.      , Scheduled Meds:  enoxaparin, 40 mg,  Subcutaneous, Q24H  sodium chloride, 10 mL, Intravenous, Q12H   , Continuous Infusions:  sodium chloride, 125 mL/hr, Last Rate: 125 mL/hr (05/14/24 1117)   , PRN Meds:    senna-docusate sodium **AND** polyethylene glycol **AND** bisacodyl **AND** bisacodyl    nitroglycerin    ondansetron    [COMPLETED] Insert Peripheral IV **AND** sodium chloride    sodium chloride    sodium chloride, and Allergies:  Patient has no allergy information on record.    Objective     Vital Signs   Temp:  [97.3 °F (36.3 °C)-97.8 °F (36.6 °C)] 97.3 °F (36.3 °C)  Heart Rate:  [] 84  Resp:  [16-33] 33  BP: (108-136)/(72-99) 127/99    PPE used per hospital policy    Physical Exam:  Constitutional: Not in acute distress.  Eyes: Injected conjunctivae, EOMI. pupils equal reactive to light.  ENMT: Engle 3. No oral thrush. Tonsils grade  Neck: Trachea midline. No thyromegaly  Heart: RRR, no murmur  Lungs: Equal but diminished air entry throughout.  No audible crackles or wheezing.  Abdomen:  Soft. No tenderness or dullness. No HSM.  Extremities: No cyanosis, clubbing or pitting edema.  Warm extremities and well-perfused.  Neuro: Somnolent but easily arousable.  Moves all extremities.  Psych: Cannot assess  Integumentary: No rash.  Normal skin turgor  Lymphatic: No palpable cervical or supraclavicular lymph nodes.      Diagnostic imaging:  I personally and independently reviewed the following images:   CT brain 5/14/2024: Clear.    Laboratory workup:    Results from last 7 days   Lab Units 05/14/24  0936 05/14/24  0353   SODIUM mmol/L  --  143   POTASSIUM mmol/L  --  4.3   CHLORIDE mmol/L  --  107   CO2 mmol/L  --  27.0   BUN mg/dL  --  11   CREATININE mg/dL 0.81 0.98   GLUCOSE mg/dL  --  78   CALCIUM mg/dL  --  8.5*         Results from last 7 days   Lab Units 05/14/24  0936 05/14/24  0353   WBC 10*3/mm3  --  4.95   HEMOGLOBIN g/dL  --  12.7*   HEMOGLOBIN, POC g/dL 12.8  --    HEMATOCRIT %  --  39.2   HEMATOCRIT POC % 38  --    PLATELETS  10*3/mm3  --  178               Assessment   Acute hypercapnic respiratory failure  Encephalopathy, secondary to hypercapnia and toxic due to intoxication  Polysubstance abuse      Recommendations:    Take off NIPPV.  Check VBG.  If significant hypercarbia or respiratory acidosis then will resume NIPPV at night only otherwise can keep him off NIPPV.  His mental status improved and his hypercapnia has probably resolved by now or improved significantly.  Counseled against smoking and using drugs  Continue to monitor his mental status.  No need for Narcan for now.  Avoid narcotics      Jose Miguel Hernandez MD  05/14/24  14:50 EDT

## 2024-05-14 NOTE — ED NOTES
Nursing report ED to floor  Davin Booker  34 y.o.  male    HPI :  HPI (Adult)  Stated Reason for Visit: Pt presents to ED from home via [facility] from home after family called for concern for overdose. Per family, pt is known for using meth, xanex and opiates. Pt given 4mg narcan intranasally and 1mg narcan IV with some improvement. Pt is arousable but immediately falls back asleep with snoring respirations. Pt has NPA in place per EMS  History Obtained From: patient, EMS    Chief Complaint  Chief Complaint   Patient presents with    Altered Mental Status       Admitting doctor:   Isaac Adams MD    Admitting diagnosis:   The primary encounter diagnosis was Altered mental status, unspecified altered mental status type. A diagnosis of History of substance abuse was also pertinent to this visit.    Code status:   Current Code Status       Date Active Code Status Order ID Comments User Context       5/14/2024 0902 CPR (Attempt to Resuscitate) [id number]  Isaac Adams MD ED        Question Answer    Code Status (Patient has no pulse and is not breathing) CPR (Attempt to Resuscitate)    Medical Interventions (Patient has pulse or is breathing) Full Support                    Allergies:   Patient has no allergy information on record.    Isolation:   No active isolations    Intake and Output  No intake or output data in the 24 hours ending 05/14/24 0907    Weight:   There were no vitals filed for this visit.    Most recent vitals:   Vitals:    05/14/24 0703 05/14/24 0718 05/14/24 0824 05/14/24 0831   BP: 108/82 121/72 132/89 136/91   BP Location:       Patient Position:       Pulse: 93 90 87 91   Resp:       Temp:       TempSrc:       SpO2: 97% 96% 96% 94%       Active LDAs/IV Access:   Lines, Drains & Airways       Active LDAs       Name Placement date Placement time Site Days    Peripheral IV 05/14/24 0337 Left Antecubital 05/14/24 0337  Antecubital  less than 1                    Labs  (abnormal labs have a star):   Labs Reviewed   COMPREHENSIVE METABOLIC PANEL - Abnormal; Notable for the following components:       Result Value    Calcium 8.5 (*)     All other components within normal limits    Narrative:     GFR Normal >60  Chronic Kidney Disease <60  Kidney Failure <15     CBC WITH AUTO DIFFERENTIAL - Abnormal; Notable for the following components:    Hemoglobin 12.7 (*)     Neutrophil % 39.8 (*)     Lymphocyte % 47.9 (*)     All other components within normal limits   ETHANOL   URINE DRUG SCREEN   BLOOD GAS, ARTERIAL   CBC AND DIFFERENTIAL    Narrative:     The following orders were created for panel order CBC & Differential.  Procedure                               Abnormality         Status                     ---------                               -----------         ------                     CBC Auto Differential[[id number]]        Abnormal            Final result                 Please view results for these tests on the individual orders.       EKG:   No orders to display       Meds given in ED:   Medications   sodium chloride 0.9 % flush 10 mL (has no administration in time range)   sodium chloride 0.9 % flush 10 mL (has no administration in time range)   sodium chloride 0.9 % flush 10 mL (has no administration in time range)   sodium chloride 0.9 % infusion 40 mL (has no administration in time range)   Pharmacy to Dose enoxaparin (LOVENOX) (has no administration in time range)   nitroglycerin (NITROSTAT) SL tablet 0.4 mg (has no administration in time range)   sennosides-docusate (PERICOLACE) 8.6-50 MG per tablet 2 tablet (has no administration in time range)     And   polyethylene glycol (MIRALAX) packet 17 g (has no administration in time range)     And   bisacodyl (DULCOLAX) EC tablet 5 mg (has no administration in time range)     And   bisacodyl (DULCOLAX) suppository 10 mg (has no administration in time range)   ondansetron (ZOFRAN) injection 4 mg (has no administration in time  range)   sodium chloride 0.9 % infusion (has no administration in time range)   sodium chloride 0.9 % bolus 1,000 mL (0 mL Intravenous Stopped 5/14/24 6355)   sodium chloride 0.9 % bolus 1,000 mL (1,000 mL Intravenous New Bag 5/14/24 0991)       Imaging results:  No radiology results for the last day    Ambulatory status:   - assist    Social issues:   Social History     Socioeconomic History    Marital status:        Peripheral Neurovascular  Peripheral Neurovascular (Adult)  Peripheral Neurovascular WDL: WDL    Neuro Cognitive  Neuro Cognitive (Adult)  Cognitive/Neuro/Behavioral WDL: arousability  Arousal Level: arouses to repeated stimulation    Learning  Learning Assessment (Adult)  Learning Readiness and Ability: cognitive limitation noted, sensory deficit noted    Respiratory  Respiratory (Adult)  Airway WDL: WDL  Respiratory WDL  Respiratory WDL: WDL    Abdominal Pain       Pain Assessments  Pain (Adult)  (0-10) Pain Rating: Rest: 0  (0-10) Pain Rating: Activity: 0    NIH Stroke Scale       Geo Harden RN  05/14/24 09:07 EDT

## 2024-05-14 NOTE — PROGRESS NOTES
[facility] Clinical Pharmacy Services: Enoxaparin Consult    Davin Booker has a pharmacy consult to dose prophylactic enoxaparin per Dr. Adams's request.     Indication: VTE Prophylaxis  Home Anticoagulation: None per [employer]    Relevant clinical data and objective history reviewed:  34 y.o. male   81.7 kg (180 lb 3.2 oz)   There is no height or weight on file to calculate BMI.   Results from last 7 days   Lab Units 05/14/24  0353   PLATELETS 10*3/mm3 178     CrCl cannot be calculated (Unknown ideal weight.).    Utilizing Cockcroft-Gault, patients CrCl is about 148.49 ml/min. I used the patient's weight of 81.7kg and Scr of 0.81.The patient is unable to stand or verbally report height. He does not appear to have a BMI >40 or <18 per my visual assessment.    Assessment/Plan    Will start patient on lovenox 40mg subcutaneous every 24 hours, adjusted for renal function. Consult order will be discontinued but pharmacy will continue to follow.     Tayla Castellon, PharmD  Clinical Pharmacist

## 2024-05-14 NOTE — NURSING NOTE
CPS report called in on reports that patient woke up tearful for daughter's safety as he does not know what his wife is going to do and it is an unsafe place to live. CPS report filed ID #[id number]

## 2024-05-15 ENCOUNTER — APPOINTMENT (OUTPATIENT)
Dept: GENERAL RADIOLOGY | Facility: HOSPITAL | Age: 34
DRG: 917 | End: 2024-05-15
Payer: MEDICAID

## 2024-05-15 LAB
ANION GAP SERPL CALCULATED.3IONS-SCNC: 8.6 MMOL/L (ref 5–15)
ATMOSPHERIC PRESS: 740.9 MMHG
BASE EXCESS BLDV CALC-SCNC: 1.2 MMOL/L (ref -2–2)
BASOPHILS # BLD AUTO: 0.01 10*3/MM3 (ref 0–0.2)
BASOPHILS NFR BLD AUTO: 0.2 % (ref 0–1.5)
BUN SERPL-MCNC: 3 MG/DL (ref 6–20)
BUN/CREAT SERPL: 4.1 (ref 7–25)
CALCIUM SPEC-SCNC: 8.1 MG/DL (ref 8.6–10.5)
CHLORIDE SERPL-SCNC: 108 MMOL/L (ref 98–107)
CO2 BLDA-SCNC: 25.8 MMOL/L (ref 23–27)
CO2 SERPL-SCNC: 24.4 MMOL/L (ref 22–29)
CREAT SERPL-MCNC: 0.74 MG/DL (ref 0.76–1.27)
DEPRECATED RDW RBC AUTO: 38.1 FL (ref 37–54)
EGFRCR SERPLBLD CKD-EPI 2021: 121.9 ML/MIN/1.73
EOSINOPHIL # BLD AUTO: 0.12 10*3/MM3 (ref 0–0.4)
EOSINOPHIL NFR BLD AUTO: 2 % (ref 0.3–6.2)
ERYTHROCYTE [DISTWIDTH] IN BLOOD BY AUTOMATED COUNT: 11.8 % (ref 12.3–15.4)
GLUCOSE SERPL-MCNC: 87 MG/DL (ref 65–99)
HCO3 BLDV-SCNC: 24.7 MMOL/L (ref 22–28)
HCT VFR BLD AUTO: 38.3 % (ref 37.5–51)
HGB BLD-MCNC: 12.9 G/DL (ref 13–17.7)
IMM GRANULOCYTES # BLD AUTO: 0.02 10*3/MM3 (ref 0–0.05)
IMM GRANULOCYTES NFR BLD AUTO: 0.3 % (ref 0–0.5)
LYMPHOCYTES # BLD AUTO: 2.13 10*3/MM3 (ref 0.7–3.1)
LYMPHOCYTES NFR BLD AUTO: 35.5 % (ref 19.6–45.3)
MCH RBC QN AUTO: 29.5 PG (ref 26.6–33)
MCHC RBC AUTO-ENTMCNC: 33.7 G/DL (ref 31.5–35.7)
MCV RBC AUTO: 87.6 FL (ref 79–97)
MODALITY: ABNORMAL
MONOCYTES # BLD AUTO: 0.41 10*3/MM3 (ref 0.1–0.9)
MONOCYTES NFR BLD AUTO: 6.8 % (ref 5–12)
NEUTROPHILS NFR BLD AUTO: 3.31 10*3/MM3 (ref 1.7–7)
NEUTROPHILS NFR BLD AUTO: 55.2 % (ref 42.7–76)
NRBC BLD AUTO-RTO: 0 /100 WBC (ref 0–0.2)
PCO2 BLDV: 35.1 MM HG (ref 41–51)
PH BLDV: 7.46 PH UNITS (ref 7.31–7.41)
PLATELET # BLD AUTO: 159 10*3/MM3 (ref 140–450)
PMV BLD AUTO: 11 FL (ref 6–12)
PO2 BLDV: 182.9 MM HG (ref 35–45)
POTASSIUM SERPL-SCNC: 3.7 MMOL/L (ref 3.5–5.2)
RBC # BLD AUTO: 4.37 10*6/MM3 (ref 4.14–5.8)
SAO2 % BLDCOV: 99.7 % (ref 45–75)
SODIUM SERPL-SCNC: 141 MMOL/L (ref 136–145)
WBC NRBC COR # BLD AUTO: 6 10*3/MM3 (ref 3.4–10.8)

## 2024-05-15 PROCEDURE — 94799 UNLISTED PULMONARY SVC/PX: CPT

## 2024-05-15 PROCEDURE — 25810000003 SODIUM CHLORIDE 0.9 % SOLUTION: Performed by: INTERNAL MEDICINE

## 2024-05-15 PROCEDURE — G0378 HOSPITAL OBSERVATION PER HR: HCPCS

## 2024-05-15 PROCEDURE — 80048 BASIC METABOLIC PNL TOTAL CA: CPT | Performed by: INTERNAL MEDICINE

## 2024-05-15 PROCEDURE — 90791 PSYCH DIAGNOSTIC EVALUATION: CPT

## 2024-05-15 PROCEDURE — 82803 BLOOD GASES ANY COMBINATION: CPT

## 2024-05-15 PROCEDURE — 94761 N-INVAS EAR/PLS OXIMETRY MLT: CPT

## 2024-05-15 PROCEDURE — 85025 COMPLETE CBC W/AUTO DIFF WBC: CPT | Performed by: INTERNAL MEDICINE

## 2024-05-15 PROCEDURE — 71045 X-RAY EXAM CHEST 1 VIEW: CPT

## 2024-05-15 PROCEDURE — 94660 CPAP INITIATION&MGMT: CPT

## 2024-05-15 PROCEDURE — 36415 COLL VENOUS BLD VENIPUNCTURE: CPT | Performed by: INTERNAL MEDICINE

## 2024-05-15 RX ORDER — CHLORPROMAZINE HYDROCHLORIDE 25 MG/1
25 TABLET, FILM COATED ORAL 3 TIMES DAILY PRN
Status: DISCONTINUED | OUTPATIENT
Start: 2024-05-15 | End: 2024-05-17 | Stop reason: HOSPADM

## 2024-05-15 RX ORDER — LORAZEPAM 1 MG/1
2 TABLET ORAL EVERY 6 HOURS PRN
Status: DISCONTINUED | OUTPATIENT
Start: 2024-05-15 | End: 2024-05-17 | Stop reason: HOSPADM

## 2024-05-15 RX ORDER — LAMOTRIGINE 100 MG/1
200 TABLET ORAL NIGHTLY
Status: DISCONTINUED | OUTPATIENT
Start: 2024-05-15 | End: 2024-05-17 | Stop reason: HOSPADM

## 2024-05-15 RX ORDER — LITHIUM CARBONATE 300 MG/1
300 TABLET, FILM COATED, EXTENDED RELEASE ORAL 2 TIMES DAILY
Status: DISCONTINUED | OUTPATIENT
Start: 2024-05-15 | End: 2024-05-17 | Stop reason: HOSPADM

## 2024-05-15 RX ADMIN — SODIUM CHLORIDE 125 ML/HR: 9 INJECTION, SOLUTION INTRAVENOUS at 23:33

## 2024-05-15 RX ADMIN — SODIUM CHLORIDE 125 ML/HR: 9 INJECTION, SOLUTION INTRAVENOUS at 03:12

## 2024-05-15 RX ADMIN — Medication 10 ML: at 20:46

## 2024-05-15 RX ADMIN — CARIPRAZINE 1.5 MG: 1.5 CAPSULE, GELATIN COATED ORAL at 14:34

## 2024-05-15 RX ADMIN — LORAZEPAM 2 MG: 1 TABLET ORAL at 14:35

## 2024-05-15 RX ADMIN — LITHIUM CARBONATE 300 MG: 300 TABLET, FILM COATED, EXTENDED RELEASE ORAL at 20:46

## 2024-05-15 RX ADMIN — LITHIUM CARBONATE 300 MG: 300 TABLET, FILM COATED, EXTENDED RELEASE ORAL at 14:34

## 2024-05-15 RX ADMIN — LAMOTRIGINE 200 MG: 100 TABLET ORAL at 20:46

## 2024-05-15 NOTE — PROGRESS NOTES
"[facility] consult d/t drugs and SI; this writer reviewed charts (which are marked for merge), spoke with RNs (Anju and Cornel), and met with pt individually in Rm 409. See RN Anju's documentation regarding events overnight; APRN placed pt on 72 hold and is requesting [facility] conduct assessment. Pt states he is \"so tired... and doesn't want to talk at 3 AM\"; he reports he is willing to \"talk about anything tomorrow\" after getting some sleep. Pt's mood is depressed with tearful affect; he is (now) calm and appears quite somnolent; [facility] will attempt consult again tomorrow. Discussed aforementioned content with Rns; no further needs and/or concerns noted at this time per pt and/or nursing team; [facility] [place] to follow.    "

## 2024-05-15 NOTE — PROGRESS NOTES
"                                              LOS: 0 days   Patient Care Team:  Provider, No Known as PCP - General    Chief Complaint:  F/up respiratory failure.    Subjective   Interval History    VBG was performed yesterday and showed persistent respiratory acidosis with pCO2 around 50.  Patient was placed on NIPPV overnight and was taken off today around 8-9 AM.      His mental status is better.  He is alert and oriented.  However when I saw him this morning, he was slightly sleepy and was snoring but was easily arousable.    REVIEW OF SYSTEMS:     RESPIRATORY: No shortness of breath, cough or sputum.   GASTROINTESTINAL: No anorexia, nausea, vomiting or diarrhea. No abdominal pain.  CONSTITUTIONAL: No fever or chills.     Ventilator/Non-Invasive Ventilation Settings (From admission, onward)       Start     Ordered    05/14/24 1853  NIPPV (CPAP or BIPAP)  As Needed-RT        Question Answer Comment   Indication Acute Respiratory Failure    Type BIPAP    Titrate Oxygen for SpO2 90% or Greater        05/14/24 1852                          Physical Exam:     Vital Signs  Temp:  [97.3 °F (36.3 °C)] 97.3 °F (36.3 °C)  Heart Rate:  [] 97  Resp:  [16-33] 16  BP: (118-144)/() 141/96    Intake/Output Summary (Last 24 hours) at 5/15/2024 1050  Last data filed at 5/15/2024 1007  Gross per 24 hour   Intake 480 ml   Output 1500 ml   Net -1020 ml     Flowsheet Rows      Flowsheet Row First Filed Value   Admission Height 170.2 cm (67\") Documented at 05/14/2024 1240   Admission Weight 81.7 kg (180 lb 3.2 oz) Documented at 05/14/2024 0901            PPE used per hospital policy    General Appearance:   Alert, cooperative, in no acute distress   ENMT:  Mallampati score 3, moist mucous membrane   Eyes:  Pupils equal and reactive to light. EOMI   Neck:   Trachea midline. No thyromegaly.   Lungs:   Equal but diminished air entry throughout.  No audible crackles or wheezing.    Heart:   Regular rhythm and normal rate, " normal S1 and S2, no         murmur   Skin:   No rash or ecchymosis   Abdomen:    Soft. No tenderness. No HSM.   Neuro/psych:  Conscious, alert, oriented x3. Strength 5/5 in upper and lower  ext.  Appropriate mood and affect   Extremities:  No cyanosis, clubbing or edema.  Warm extremities and well-perfused          Results Review:        Results from last 7 days   Lab Units 05/15/24  0726 05/14/24  0936 05/14/24  0353   SODIUM mmol/L 141  --  143   POTASSIUM mmol/L 3.7  --  4.3   CHLORIDE mmol/L 108*  --  107   CO2 mmol/L 24.4  --  27.0   BUN mg/dL 3*  --  11   CREATININE mg/dL 0.74* 0.81 0.98   GLUCOSE mg/dL 87  --  78   CALCIUM mg/dL 8.1*  --  8.5*         Results from last 7 days   Lab Units 05/15/24  0726 05/14/24  0936 05/14/24  0353   WBC 10*3/mm3 6.00  --  4.95   HEMOGLOBIN g/dL 12.9*  --  12.7*   HEMOGLOBIN, POC g/dL  --  12.8  --    HEMATOCRIT % 38.3  --  39.2   HEMATOCRIT POC %  --  38  --    PLATELETS 10*3/mm3 159  --  178                           Results from last 7 days   Lab Units 05/14/24  1934 05/14/24  0936   PH, ARTERIAL pH units  --  7.291*   PCO2, ARTERIAL mm Hg  --  55.1*   PO2 ART mm Hg  --  66.1*   O2 SATURATION ART %  --  89.9*   FLOW RATE lpm 3.0000  --    MODALITY  Cannula Room Air         I reviewed the patient's new clinical results.        Medication Review:   enoxaparin, 40 mg, Subcutaneous, Q24H  sodium chloride, 10 mL, Intravenous, Q12H        sodium chloride, 125 mL/hr, Last Rate: 125 mL/hr (05/15/24 0954)        Diagnostic imaging:  I personally and independently reviewed the following images:  CXR 5/15/: Clear    Assessment     Acute hypercapnic respiratory failure, 2ary to intoxication  Encephalopathy, secondary to hypercapnia and toxic due to intoxication  Polysubstance abuse  Vaping disorder  ADHD  Bipolar disorder    Plan     CXR obtained due to hypercapnia and its clear.  VBG is normal now.  I will DC his NIPPV.  DVT prophylaxis with Lovenox.  Counseled against  smoking/vaping    No additional recommendation.  Thank you for the consult.        Jose Miguel Hernandez MD  05/15/24  10:50 EDT          This note was dictated utilizing Dragon dictation

## 2024-05-15 NOTE — NURSING NOTE
Patient is alert and oriented x4.  He gave given me permission to speak to his mom, Bhumi Castaneda.  Bhumi states that this is her son's 6 or 7th time trying to commit suicide.  That the patient always tends to blame the attempt on his struggling marriage but she is not sure that is the root of the problems.  She states that the patient is a narcissist and bipolar.  He is noncompliant with his medications but addicted to xanax and Adderall.  She states that the patient's wife had been sober for 6 months but recently started drinking again, so she is not sure if that is what triggered this attempt.  She states the patient has previously been admitted for psychiatric stays at [facility] and [facility].      Bhumi is requesting to have Dr. Irving call her to discuss her options as she recently heard about [facility] and [facility].     Bhumi does have POA paperwork, it has been faxed and is on file.  She understands that the patient is alert and oriented.
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
## 2024-05-15 NOTE — PROGRESS NOTES
Name: Davin Booker ADMIT: 2024   : 1990  PCP: Provider, No Known    MRN: [id number] LOS: 0 days   AGE/SEX: 34 y.o. male  ROOM: [facility]     Subjective   Subjective   No acute issues.     Objective   Objective   Vital Signs  Heart Rate:  [] 97  Resp:  [15-18] 15  BP: (119-144)/() 119/80  SpO2:  [99 %-100 %] 99 %  on  Flow (L/min):  [3] 3;   Device (Oxygen Therapy): room air  Body mass index is 28.22 kg/m².  Physical Exam  Constitutional:       Appearance: He is ill-appearing.   Pulmonary:      Effort: Pulmonary effort is normal. No respiratory distress.      Breath sounds: No stridor.   Skin:     Coloration: Skin is not jaundiced or pale.   Neurological:      Mental Status: He is alert and oriented to person, place, and time.         Results Review     I reviewed the patient's new clinical results.  Results from last 7 days   Lab Units 05/15/24  0726 05/14/24  0936 05/14/24  0353   WBC 10*3/mm3 6.00  --  4.95   HEMOGLOBIN g/dL 12.9*  --  12.7*   HEMOGLOBIN, POC g/dL  --  12.8  --    PLATELETS 10*3/mm3 159  --  178     Results from last 7 days   Lab Units 05/15/24  0726 05/14/24  0936 24  0353   SODIUM mmol/L 141  --  143   POTASSIUM mmol/L 3.7  --  4.3   CHLORIDE mmol/L 108*  --  107   CO2 mmol/L 24.4  --  27.0   BUN mg/dL 3*  --  11   CREATININE mg/dL 0.74* 0.81 0.98   GLUCOSE mg/dL 87  --  78   EGFR mL/min/1.73 121.9  --  103.8     Results from last 7 days   Lab Units 24  0353   ALBUMIN g/dL 4.2   BILIRUBIN mg/dL 0.6   ALK PHOS U/L 56   AST (SGOT) U/L 25   ALT (SGPT) U/L 17     Results from last 7 days   Lab Units 05/15/24  0726 05/14/24  0353   CALCIUM mg/dL 8.1* 8.5*   ALBUMIN g/dL  --  4.2     Results from last 7 days   Lab Units 24  0936   LACTATE mmol/L 0.5     Glucose   Date/Time Value Ref Range Status   2024 1055 88 70 - 130 mg/dL Final   2024 0936 94 70 - 130 mg/dL Final     Comment:     Serial Number: [id number]:  [id number]       No radiology  results for the last day  Scheduled Medications  Cariprazine HCl, 1.5 mg, Oral, Daily  enoxaparin, 40 mg, Subcutaneous, Q24H  lamoTRIgine, 200 mg, Oral, Nightly  lithium, 300 mg, Oral, BID  sodium chloride, 10 mL, Intravenous, Q12H    Infusions  sodium chloride, 125 mL/hr, Last Rate: 125 mL/hr (05/15/24 1236)    Diet  Diet: Regular/House; Safe Tray; Fluid Consistency: Thin (IDDSI 0)       Assessment/Plan     Active Hospital Problems    Diagnosis  POA    **Altered mental status [R41.82]  Yes    Polysubstance abuse [F19.10]  Unknown    Acidosis [E87.20]  Unknown    Hypercarbia [R06.89]  Unknown      Resolved Hospital Problems   No resolved problems to display.       34 y.o. male admitted with Altered mental status.      05/15/24  Pending IP Psych once arrangements made.     Acute hypoxic respiratory failure, resolved  Toxic encephalopathy, improved  Polysubstance use  -Access consult  -Psych following-recommended inpatient hospitalization    BPD  -Vraylar, Lamictal, lithium    Flow (L/min):  [3] 3    DVT prophylaxis: Lovenox  Discussed with patient and nursing staff.  Anticipated discharge  IP Psych , once arrangements made            Leonardo Lanza MD  [facility]  05/15/24  14:06 EDT       Yes

## 2024-05-15 NOTE — CONSULTS
"IDENTIFYING INFORMATION: The patient is a 34-year-old white male last by this physician on 4/17/2024 following a suicide attempt.  He is readmitted after yet another overdose and suicide attempt.    CHIEF COMPLAINT: None given    INFORMANT: Patient and chart    RELIABILITY: Limited    HISTORY OF PRESENT ILLNESS: The patient is a 34-year-old white male with a reported history of a bipolar spectrum disorder as well as polysubstance dependence including abuse of methamphetamine and benzodiazepines cannabis and fentanyl which were all positive on the patient's urine drug screen.  He was admitted following yet another overdose.  The patient was brought to this facility by his partner who left a handwritten note stating that she was leaving him and taking their child.  The patient reports that the precipitant to his overdose for \"marital issues.\"  The patient is very groggy when interviewed today.  He is equivocal when queried regarding ongoing suicidal ideation.  He states that he did not write a suicide note and denies involvement of alcohol in his ingestion.  For more complete history of present illness please refer to previous dictated notes    PAST PSYCHIATRIC HISTORY: Reviewed no change    PAST MEDICAL HISTORY: Reviewed no changes    MEDICATIONS:   Medications Prior to Admission   Medication Sig Dispense Refill Last Dose    ALPRAZolam (XANAX) 1 MG tablet Take 1 tablet by mouth Daily As Needed for Anxiety.       amphetamine-dextroamphetamine (ADDERALL) 30 MG tablet Take 1 tablet by mouth 2 (Two) Times a Day.       Cariprazine HCl (Vraylar) 1.5 MG capsule capsule Take 1 capsule by mouth Daily.       guanFACINE HCl ER (INTUNIV) 1 MG tablet sustained-release 24 hour tablet Take 1 tablet by mouth Daily.       lamoTRIgine (LaMICtal) 200 MG tablet Take 1 tablet by mouth 2 (Two) Times a Day.       lithium (LITHOBID) 300 MG CR tablet Take 1 tablet by mouth 2 (Two) Times a Day.            ALLERGIES: Unknown    FAMILY HISTORY: " Reviewed no changes    SOCIAL HISTORY: Reviewed no changes apart recent marital break-up    MENTAL STATUS EXAM: The patient is a well-developed but groggy and barely able to participate in interview.  He is equivocal when queried regarding ongoing suicidal ideations.    ASSETS/LIABILITIES: To be assessed    DIAGNOSTIC IMPRESSION: Methamphetamine use disorder, opioid use disorder, cannabis use disorder, sedative-hypnotic use disorder, bipolar disorder by history    PLAN: I have explained to the patient that under the circumstances of his readmission to the hospital that admission to a psychiatric hospital will be necessary.  The patient expresses some hesitance to return to his psychiatric hospital.  I explained to them that if he does not cooperate and go voluntarily that a mental and quests warrant will be sought and that this will be a much more unpleasant process for him.  In the meantime, I would recommend continuation of his sitter, and would recommend that he be placed on a detoxification protocol for opioids.  I will add as needed lorazepam to be given for any issues with sedative-hypnotic withdrawal.  I would not recommend reinitiation of Adderall or alprazolam and feel as though this patient is singularly unsuitable for continuation of these medications given his extensive substance abuse history.  I have restarted Vraylar, Lamictal and lithium.

## 2024-05-15 NOTE — NURSING NOTE
"Pt awoke from sleep and noticed his wife, Magdalene, sitting at bedside. Pt immediately became upset and tearful. Pt began to scream, \"I did everything right this time, why am I still alive?\" \"Its easier to buy Fentanyl than to buy a gun.\" and \"It should be legal to end my life if I want to.\"     Pt and Magdalene began screaming and cursing at each other. Magdalene stated to the patient \"Your mom doesn't want anything to do with you, I don't want anything to do with you, Grandma doesn't want anything to do with you, you have no place to go after this.\" \"We don't have our kid because she keeps finding you trying to die.\" And \"Do it right next time.\" Magdalene was then asked to leave. Magdalene continued screaming at the patient and down the hallway stating \"Try harder next time.\" Security was called and Magdalene was escorted off of the property.     Per the , Antonia, Magdalene is no longer allowed on the property.     Pt continued to state he wanted to die and that he was going to leave. Pt stated \"I don't want to be here, I'm not staying here.\" This RN paged LETY Shah, along with [facility]. Per Bria Vitale, orders were placed for a 72 hour hold and [facility] stated they were coming to interview the patient.           "

## 2024-05-15 NOTE — PLAN OF CARE
"Goal Outcome Evaluation:           Progress: no change  Outcome Evaluation: VBG drawn at start of shift showing pH of 7.294 and pCO2 of 51.8. Bp elevated. MD aware. Telemetry NSR. Maintaining SpO2 on bipap. Pt lethargic most of shift but was arousable to verbal stimulus. Pt was bladder scanned at about 0300 because pt had no UOP. Bladder scan read >999 ml. Pt woken up by staff and was able to void a large amount and had a post void residual bladder scan of just 112. Pt stated he was thirsty when he woke up as well and was able to tolerate 240 ml of water and 240 ml of orange juice with no issues. Pt's wife was at bedside much of shift until pt woke up at 0300 stating \"the person responsible for me being here is sitting right there.\" See previous nursing note for all that ensued after this. Pt's wife was escorted out of facility by security and pt status changed to do not announce. Pt was very agitated and verbally aggressive with staff for a short time and made many suicidal statements as well. Pt was able to be calmed down and placed back on to bipap. Resting at this time.  at bedside.                               "

## 2024-05-16 VITALS
HEIGHT: 67 IN | BODY MASS INDEX: 28.28 KG/M2 | HEART RATE: 76 BPM | DIASTOLIC BLOOD PRESSURE: 84 MMHG | OXYGEN SATURATION: 94 % | WEIGHT: 180.2 LBS | TEMPERATURE: 98.1 F | RESPIRATION RATE: 18 BRPM | SYSTOLIC BLOOD PRESSURE: 139 MMHG

## 2024-05-16 PROBLEM — R45.89 SUICIDAL BEHAVIOR: Status: ACTIVE | Noted: 2024-05-16

## 2024-05-16 PROBLEM — R06.89 HYPERCARBIA: Status: RESOLVED | Noted: 2024-05-14 | Resolved: 2024-05-16

## 2024-05-16 PROBLEM — E87.20 ACIDOSIS: Status: RESOLVED | Noted: 2024-05-14 | Resolved: 2024-05-16

## 2024-05-16 PROBLEM — R41.82 ALTERED MENTAL STATUS: Status: RESOLVED | Noted: 2024-05-14 | Resolved: 2024-05-16

## 2024-05-16 PROCEDURE — 25810000003 SODIUM CHLORIDE 0.9 % SOLUTION: Performed by: INTERNAL MEDICINE

## 2024-05-16 RX ADMIN — LORAZEPAM 2 MG: 1 TABLET ORAL at 08:00

## 2024-05-16 RX ADMIN — CARIPRAZINE 1.5 MG: 1.5 CAPSULE, GELATIN COATED ORAL at 08:00

## 2024-05-16 RX ADMIN — SODIUM CHLORIDE 125 ML/HR: 9 INJECTION, SOLUTION INTRAVENOUS at 06:39

## 2024-05-16 RX ADMIN — LITHIUM CARBONATE 300 MG: 300 TABLET, FILM COATED, EXTENDED RELEASE ORAL at 20:40

## 2024-05-16 RX ADMIN — LAMOTRIGINE 200 MG: 100 TABLET ORAL at 20:40

## 2024-05-16 RX ADMIN — LITHIUM CARBONATE 300 MG: 300 TABLET, FILM COATED, EXTENDED RELEASE ORAL at 08:00

## 2024-05-16 RX ADMIN — Medication 10 ML: at 08:01

## 2024-05-16 NOTE — PROGRESS NOTES
"The patient remains irritable but does today assent to voluntary admission to a psychiatric facility.  He is very much in denial regarding his substance abuse issues claiming that \"I hate the way opioids make me feel\", this in spite of the fact that his drug screen was also positive for benzodiazepine, cannabis and methamphetamine.  Once medically stable, Access will begin to make efforts to have the placement sent for chemical dependence treatment.  I have suggested that the patient consider residential chemical dependence treatment but he is entirely resistant to this, claiming that he does not have an issue with drugs.    I spoke with the patient's mother earlier today and apprised her of the situation.  I strongly recommended residential chemical dependence treatment, but given the patient's utter lack of investment in maintenance of sobriety, and uncertain that this would accomplish a great deal.  "

## 2024-05-16 NOTE — NURSING NOTE
Access attempted follow-up; however, pt is snoring and does not awaken. Sitter continues at bedside. He reported pt was a bit agitated this morning - yelling/screaming due to wanting to call his wife with whom he is not allowed to call. Psychiatrist following with plan for inpt psych placement when medically cleared.

## 2024-05-16 NOTE — DISCHARGE SUMMARY
"    Patient Name: Davin Booker  : 1990  MRN: [id number]    Date of Admission: 2024  Date of Discharge:  2024  Primary Care Physician: Provider, No Known      Chief Complaint:   Altered Mental Status      Discharge Diagnoses     Active Hospital Problems    Diagnosis  POA    Suicidal behavior [R45.89]  Not Applicable    Polysubstance abuse [F19.10]  Unknown      Resolved Hospital Problems    Diagnosis Date Resolved POA    **Altered mental status [R41.82] 2024 Yes    Acidosis [E87.20] 2024 Unknown    Hypercarbia [R06.89] 2024 Unknown        Admitting HPI     \"This is a 34-year-old male with well-known history of polysubstance abuse who presented to the emergency room via EMS for altered mental status. He was given intranasal Narcan followed by IV Narcan and route to the hospital with transient improvement of symptoms. Here he has remained very somnolent over the past 5 hours and will hardly arouse to intense sternal rubbing. Prior UDS has revealed amphetamines, fentanyl, benzodiazepines, THC. UDS this morning is pending. Patient was accompanied to the hospital by his partner. She is no longer present and has left a handwritten note for him stating that she is leaving him and taking their child with her due to his inability to get clean. \"    Hospital Course     Pt admitted for suicide attempt.  Hypoxic and hypercapnic on admission for which pulmonology was consulted.  He was weaned to room air.  Seen in consultation with psychiatry who recommended inpatient psychiatric treatment.  Ambulance has been arranged for transfer to the [place].    Day of Discharge     Physical Exam:  Temp:  [98.2 °F (36.8 °C)-98.4 °F (36.9 °C)] 98.4 °F (36.9 °C)  Heart Rate:  [83-96] 96  Resp:  [16-18] 18  BP: (123-150)/(76-94) 129/94  Body mass index is 28.22 kg/m².  Physical Exam  Constitutional:       Appearance: He is ill-appearing.   Pulmonary:      Effort: Pulmonary effort is normal. No respiratory " distress.      Breath sounds: No stridor.   Skin:     Coloration: Skin is not pale.   Neurological:      Mental Status: He is alert and oriented to person, place, and time.         Consultants     Consult Orders (all) (From admission, onward)       Start     Ordered    05/15/24 0246  Inpatient Psychiatrist Consult  Once,   Status:  Canceled        Specialty:  Psychiatry  Provider:  Bertin Irving III, MD    05/15/24 0246    05/14/24 1801  Inpatient Psychiatrist Consult  Once        Specialty:  Psychiatry  Provider:  Bertin Irving III, MD    05/14/24 1800    05/14/24 0943  Inpatient Pulmonology Consult  Once        Specialty:  Pulmonary Disease  Provider:  Josr Pineda MD    05/14/24 0944    05/14/24 0904  Inpatient [facility] Consult  Once        Provider:  (Not yet assigned)    05/14/24 0903    05/14/24 0814  [facility] (on-call MD unless specified) Details  Once        Specialty:  Hospitalist  Provider:  Isaac Adams MD    05/14/24 0813                  Procedures     * Surgery not found *      Imaging Results (All)       Procedure Component Value Units Date/Time    XR Chest 1 View [[id number]] Collected: 05/15/24 1414     Updated: 05/15/24 1417    Narrative:      XR CHEST 1 VW-     HISTORY: Male who is 34 years-old, hypercapnia     TECHNIQUE: Frontal view of the chest     COMPARISON: None available     FINDINGS: Heart, mediastinum and pulmonary vasculature are unremarkable.  No focal pulmonary consolidation, pleural effusion, or pneumothorax. No  acute osseous process.       Impression:      No evidence for acute pulmonary process. Follow-up as  clinical indications persist.     This report was finalized on 5/15/2024 2:14 PM by Dr. Vikas Dao M.D on Workstation: [id number]       CT Head Without Contrast [[id number]] Collected: 05/14/24 0800     Updated: 05/14/24 1020    Narrative:      CT HEAD WITHOUT CONTRAST     HISTORY: Altered mental status, found down.    "  COMPARISON: None.     FINDINGS: The brain and ventricles are symmetrical. There is no evidence  of hemorrhage, hydrocephalus, abnormal extra-axial fluid or of a focal  area of decreased attenuation to suggest acute infarction. The  gray-white demarcation is maintained. Bone windows showed no evidence of  a calvarial fracture. Further evaluation could be performed with a MRI  examination of the brain as indicated.     Radiation dose reduction techniques were utilized, including automated  exposure control and exposure modulation based on body size.        This report was finalized on 5/14/2024 10:17 AM by Dr. Varinder Quijano M.D on Workstation: [id number]                 Pertinent Labs     Results from last 7 days   Lab Units 05/15/24  0726 05/14/24  0936 05/14/24  0353   WBC 10*3/mm3 6.00  --  4.95   HEMOGLOBIN g/dL 12.9*  --  12.7*   HEMOGLOBIN, POC g/dL  --  12.8  --    PLATELETS 10*3/mm3 159  --  178     Results from last 7 days   Lab Units 05/15/24  0726 05/14/24  0936 05/14/24  0353   SODIUM mmol/L 141  --  143   POTASSIUM mmol/L 3.7  --  4.3   CHLORIDE mmol/L 108*  --  107   CO2 mmol/L 24.4  --  27.0   BUN mg/dL 3*  --  11   CREATININE mg/dL 0.74* 0.81 0.98   GLUCOSE mg/dL 87  --  78   EGFR mL/min/1.73 121.9  --  103.8     Results from last 7 days   Lab Units 05/14/24  0353   ALBUMIN g/dL 4.2   BILIRUBIN mg/dL 0.6   ALK PHOS U/L 56   AST (SGOT) U/L 25   ALT (SGPT) U/L 17     Results from last 7 days   Lab Units 05/15/24  0726 05/14/24  0353   CALCIUM mg/dL 8.1* 8.5*   ALBUMIN g/dL  --  4.2               Invalid input(s): \"LDLCALC\"          Test Results Pending at Discharge     Pending Labs       Order Current Status    Blood Gas, Venous - Collected (05/14/24 8187)            Discharge Details        Discharge Medications        Continue These Medications        Instructions Start Date   LaMICtal 200 MG tablet  Generic drug: lamoTRIgine   200 mg, Oral, 2 Times Daily      lithium 300 MG CR tablet  Commonly known " as: LITHOBID   300 mg, Oral, 2 Times Daily      Vraylar 1.5 MG capsule capsule  Generic drug: Cariprazine HCl   1.5 mg, Oral, Daily             Stop These Medications      ALPRAZolam 1 MG tablet  Commonly known as: XANAX     amphetamine-dextroamphetamine 30 MG tablet  Commonly known as: ADDERALL     guanFACINE HCl ER 1 MG tablet sustained-release 24 hour tablet  Commonly known as: INTUNIV              No Known Allergies    Discharge Disposition:  Psychiatric Hospital or Unit ([place] - External or [facility])      Discharge Diet:  Diet Order   Procedures    Diet: Regular/House; Safe Tray; Fluid Consistency: Thin (IDDSI 0)       Discharge Activity:   Activity Instructions       Activity as Tolerated              CODE STATUS:    Code Status and Medical Interventions:   Ordered at: 05/14/24 0902     Code Status (Patient has no pulse and is not breathing):    CPR (Attempt to Resuscitate)     Medical Interventions (Patient has pulse or is breathing):    Full Support       No future appointments.   Follow-up Information       Provider, No Known .    Contact information:  [facility] [id number] [phone]                             Time Spent on Discharge:  Greater than 30 minutes spent on discharge management including final examination, discussion of hospital stay and patient education, preparation of records, medication reconciliation, follow up planning      Leonardo Lanza MD  [facility]  05/16/24  14:23 EDT

## 2024-05-16 NOTE — PLAN OF CARE
Goal Outcome Evaluation:            Outcome Evaluation: pt has stable vital signs. on room air, normal sinus rythm on the monitor. pt is alert and oriented x4. has a sitter by bedside. call light within reach.

## 2024-05-16 NOTE — PAYOR COMM NOTE
"Cara Rosenthal (34 y.o. Male)          INPATIENT REQUEST FOR [id number]     ADMITTED AS OBS 5/14  AND CONVERTED TO INPATIENT 5/16    CONTACT FAX# [phone]         Date of Birth   1990    Social Security Number       Address   [address] [place] [address] [address] [address]    Home Phone   [phone]    MRN   [id number]       Temple   None    Marital Status                               Admission Date   5/14/24    Admission Type   Emergency    Admitting Provider   Isaac Adams MD    Attending Provider   Leonardo Lanza MD    Department, Room/Bed   [address], S[address]/1       Discharge Date       Discharge Disposition   Psychiatric Hospital or Unit (DC - External or [facility])    Discharge Destination                                 Attending Provider: Leonardo Lanza MD    Allergies: No Known Allergies    Isolation: None   Infection: None   Code Status: CPR    Ht: 170.2 cm (67\")   Wt: 81.7 kg (180 lb 3.2 oz)    Admission Cmt: None   Principal Problem: Altered mental status [R41.82]                   Active Insurance as of 5/14/2024       Primary Coverage       Payor Plan Insurance Group Employer/Plan Group    [place] [id number]       Payor Plan Address Payor Plan Phone Number Payor Plan Fax Number Effective Dates    [employer] [address] [address] [phone]  2/1/2024 - None Entered    Formerly [facility] [id number]         Subscriber Name Subscriber Birth Date Member ID       CARA ROSENTHAL 1990 [id number]                     Emergency Contacts        (Rel.) Home Phone Work Phone Mobile Phone    Magdalene Rosenthal (Spouse) -- -- [phone]    Bhumi Castaneda (Mother) -- -- [phone]                 History & Physical        Isaac Adams MD at 05/14/24 0903          [facility] Admission H&P    Patient Care Team:  Provider, No Known as PCP - General    Chief complaint: Brought to the emergency room by EMS for " altered mental status    History of Present Illness    This is a 34-year-old male with well-known history of polysubstance abuse who presented to the emergency room via EMS for altered mental status.  He was given intranasal Narcan followed by IV Narcan and route to the hospital with transient improvement of symptoms.  Here he has remained very somnolent over the past 5 hours and will hardly arouse to intense sternal rubbing.  Prior UDS has revealed amphetamines, fentanyl, benzodiazepines, THC.  UDS this morning is pending.  Patient was accompanied to the hospital by his partner.  She is no longer present and has left a handwritten note for him stating that she is leaving him and taking their child with her due to his inability to get clean.    History reviewed. No pertinent past medical history.  History reviewed. No pertinent surgical history.  History reviewed. No pertinent family history.     (Not in a hospital admission)    Allergies:  Patient has no allergy information on record.    Review of Systems   Unable to perform ROS: Mental status change        PHYSICAL EXAM    Vital Signs  tMax 24 hrs:  Temp (24hrs), Av.8 °F (36.6 °C), Min:97.8 °F (36.6 °C), Max:97.8 °F (36.6 °C)    Vitals Ranges:  Temp:  [97.8 °F (36.6 °C)] 97.8 °F (36.6 °C)  Heart Rate:  [] 91  Resp:  [16] 16  BP: (108-136)/(72-96) 136/91    Physical Exam  Vitals and nursing note reviewed.   Constitutional:       General: He is not in acute distress.     Appearance: He is well-developed. He is not ill-appearing.   HENT:      Head: Normocephalic and atraumatic.      Nose: Nose normal.      Mouth/Throat:      Mouth: Mucous membranes are not dry.   Eyes:      Conjunctiva/sclera: Conjunctivae normal.      Pupils: Pupils are equal, round, and reactive to light.      Comments: Pinpoint pupils   Neck:      Vascular: No JVD.   Cardiovascular:      Rate and Rhythm: Normal rate and regular rhythm.      Heart sounds: No murmur heard.     No gallop.    Pulmonary:      Effort: Pulmonary effort is normal. No accessory muscle usage or respiratory distress.      Breath sounds: No decreased breath sounds or wheezing.   Abdominal:      General: Bowel sounds are normal. There is no distension.      Palpations: Abdomen is soft.      Tenderness: There is no abdominal tenderness. There is no guarding or rebound.   Musculoskeletal:         General: No deformity. Normal range of motion.      Cervical back: Normal range of motion and neck supple.   Lymphadenopathy:      Cervical: No cervical adenopathy.   Skin:     General: Skin is warm and dry.      Findings: No erythema or rash.   Neurological:      Mental Status: He is alert.      Comments: Extremely somnolent.  Will make faint intentional movements with sternal rubbing.  ER staff was able to get him to hold up 2 fingers upon command.         Results Review:  Results from last 7 days   Lab Units 05/14/24  0353   WBC 10*3/mm3 4.95   HEMOGLOBIN g/dL 12.7*   HEMATOCRIT % 39.2   PLATELETS 10*3/mm3 178     Results from last 7 days   Lab Units 05/14/24  0353   SODIUM mmol/L 143   POTASSIUM mmol/L 4.3   CHLORIDE mmol/L 107   CO2 mmol/L 27.0   BUN mg/dL 11   CREATININE mg/dL 0.98   CALCIUM mg/dL 8.5*   BILIRUBIN mg/dL 0.6   ALK PHOS U/L 56   ALT (SGPT) U/L 17   AST (SGOT) U/L 25   GLUCOSE mg/dL 78        I reviewed the patient's new clinical results.  I reviewed the patient's new imaging results and agree with the interpretation.        Active Hospital Problems    Diagnosis  POA    **Altered mental status [R41.82]  Yes    Polysubstance abuse [F19.10]  Unknown      Resolved Hospital Problems   No resolved problems to display.       Assessment & Plan    The patient is going to be admitted for prolonged somnolence due to his polysubstance abuse.  UDS is pending however historically he has had amphetamines, benzodiazepines, fentanyl, and THC in his system.  Head CT was negative and basic labs are unremarkable.  I will check an ABG.   Provide supportive care with IV fluids.  Will await improvement of his mentation and somnolence.  His partner has left him a note stating she is leaving him and taking their child with her.  We will have to keep a close eye for any potential depression or suicidality once he is awake.  Will ask access center to see him.  Additional plans based on his clinical course.    I discussed the patients findings and my recommendations with nursing staff      Isaac Adams MD  05/14/24  09:03 EDT    Addendum: ABG has now been performed showing a pH of 7.29 with pCO2 of 55.  Consulting pulmonology for potential need of BiPAP.          Electronically signed by Isaac Adams MD at 05/14/24 8254

## 2024-05-16 NOTE — CASE MANAGEMENT/SOCIAL WORK
Tj Arteaga - patient has been accepted at [facility] under Dr. Iker Dao. Report to be called to [phone] - ask for adult unit. EMS time to be determined. Antonia to call me back.

## 2024-05-16 NOTE — PROGRESS NOTES
Name: Davin Booker ADMIT: 2024   : 1990  PCP: Provider, No Known    MRN: [id number] LOS: 0 days   AGE/SEX: 34 y.o. male  ROOM: [facility]     Subjective   Subjective   Sleeping this morning.  No acute problems upon awakening.    Objective   Objective   Vital Signs  Temp:  [98.2 °F (36.8 °C)-98.4 °F (36.9 °C)] 98.4 °F (36.9 °C)  Heart Rate:  [70-94] 87  Resp:  [15-18] 16  BP: (119-150)/(76-82) 150/76  SpO2:  [97 %-99 %] 99 %  on   ;   Device (Oxygen Therapy): room air  Body mass index is 28.22 kg/m².  Physical Exam  Constitutional:       Appearance: He is ill-appearing.   Pulmonary:      Effort: Pulmonary effort is normal. No respiratory distress.      Breath sounds: No stridor.   Skin:     Coloration: Skin is not jaundiced or pale.   Neurological:      Mental Status: He is alert and oriented to person, place, and time.         Results Review     I reviewed the patient's new clinical results.  Results from last 7 days   Lab Units 05/15/24  0724  0936 24  0353   WBC 10*3/mm3 6.00  --  4.95   HEMOGLOBIN g/dL 12.9*  --  12.7*   HEMOGLOBIN, POC g/dL  --  12.8  --    PLATELETS 10*3/mm3 159  --  178     Results from last 7 days   Lab Units 05/15/24  0724  0936 24  0353   SODIUM mmol/L 141  --  143   POTASSIUM mmol/L 3.7  --  4.3   CHLORIDE mmol/L 108*  --  107   CO2 mmol/L 24.4  --  27.0   BUN mg/dL 3*  --  11   CREATININE mg/dL 0.74* 0.81 0.98   GLUCOSE mg/dL 87  --  78   EGFR mL/min/1.73 121.9  --  103.8     Results from last 7 days   Lab Units 24  0353   ALBUMIN g/dL 4.2   BILIRUBIN mg/dL 0.6   ALK PHOS U/L 56   AST (SGOT) U/L 25   ALT (SGPT) U/L 17     Results from last 7 days   Lab Units 05/15/24  0726 24  0353   CALCIUM mg/dL 8.1* 8.5*   ALBUMIN g/dL  --  4.2     Results from last 7 days   Lab Units 24  0936   LACTATE mmol/L 0.5     Glucose   Date/Time Value Ref Range Status   2024 1055 88 70 - 130 mg/dL Final   2024 0936 94 70 - 130 mg/dL  Final     Comment:     Serial Number: [id number]:  [id number]       XR Chest 1 View    Result Date: 5/15/2024  No evidence for acute pulmonary process. Follow-up as clinical indications persist.  This report was finalized on 5/15/2024 2:14 PM by Dr. Vikas Dao M.D on Workstation: [id number]     Scheduled Medications  Cariprazine HCl, 1.5 mg, Oral, Daily  enoxaparin, 40 mg, Subcutaneous, Q24H  lamoTRIgine, 200 mg, Oral, Nightly  lithium, 300 mg, Oral, BID  sodium chloride, 10 mL, Intravenous, Q12H    Infusions  sodium chloride, 125 mL/hr, Last Rate: 125 mL/hr (05/16/24 0639)    Diet  Diet: Regular/House; Safe Tray; Fluid Consistency: Thin (IDDSI 0)       Assessment/Plan     Active Hospital Problems    Diagnosis  POA    **Altered mental status [R41.82]  Yes    Polysubstance abuse [F19.10]  Unknown    Acidosis [E87.20]  Unknown    Hypercarbia [R06.89]  Unknown      Resolved Hospital Problems   No resolved problems to display.       34 y.o. male admitted with Altered mental status.      05/16/24  Medically stable for IP Psych once arrangements made.    Acute hypoxic respiratory failure, resolved  Toxic encephalopathy, improved  Polysubstance use  -Access consult  -Psych following-recommended inpatient hospitalization    BPD  -Vraylar, Lamictal, lithium         DVT prophylaxis: Lovenox  Discussed with patient and nursing staff.  Anticipated discharge  IP Psych , once arrangements made            Leonardo Lanza MD  [facility]  05/16/24  07:56 EDT

## 2024-05-17 NOTE — CASE MANAGEMENT/SOCIAL WORK
Case Management Discharge Note      Final Note: [facility]         Selected Continued Care - Discharged on 5/17/2024 Admission date: 5/14/2024 - Discharge disposition: Psychiatric Hospital or Unit (DC - External or [facility])      Destination    No services have been selected for the patient.                Durable Medical Equipment    No services have been selected for the patient.                Dialysis/Infusion    No services have been selected for the patient.                Home Medical Care    No services have been selected for the patient.                Therapy    No services have been selected for the patient.                Community Resources    No services have been selected for the patient.                Community & DME    No services have been selected for the patient.                    Selected Continued Care - Prior Encounters Includes continued care and service providers with selected services from prior encounters from 2/14/2024 to 5/17/2024      Discharged on 4/19/2024 Admission date: 4/16/2024 - Discharge disposition: Psychiatric Hospital or Unit (DC - External or [facility])      Destination       Service Provider Selected Services Address Phone Fax Patient Preferred    [facility] [address] [facility] [address] [phone] --                               Final Discharge Disposition Code: 65 - psychiatric hospital or unit

## 2024-05-17 NOTE — PAYOR COMM NOTE
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 status:      E-cigarette/Vaping     E-cigarette/Vaping Substances     E-cigarette/Vaping Devices         ,   Medications Prior to Admission   Medication Sig Dispense Refill Last Dose    ALPRAZolam (XANAX) 1 MG tablet Take 1 tablet by mouth Daily As Needed for Anxiety.       amphetamine-dextroamphetamine (ADDERALL) 30 MG tablet Take 1 tablet by mouth 2 (Two) Times a Day.       Cariprazine HCl (Vraylar) 1.5 MG capsule capsule Take 1 capsule by mouth Daily.       guanFACINE HCl ER (INTUNIV) 1 MG tablet sustained-release 24 hour tablet Take 1 tablet by mouth Daily.       lamoTRIgine (LaMICtal) 200 MG tablet Take 1 tablet by mouth 2 (Two) Times a Day.       lithium (LITHOBID) 300 MG CR tablet Take 1 tablet by mouth 2 (Two) Times a Day.      , Scheduled Meds:  enoxaparin, 40 mg, Subcutaneous, Q24H  sodium chloride, 10 mL, Intravenous, Q12H   , Continuous Infusions:  sodium chloride, 125 mL/hr, Last Rate: 125 mL/hr (05/14/24 1117)   , PRN Meds:    senna-docusate sodium **AND** polyethylene glycol **AND**  bisacodyl **AND** bisacodyl    nitroglycerin    ondansetron    [COMPLETED] Insert Peripheral IV **AND** sodium chloride    sodium chloride    sodium chloride, and Allergies:  Patient has no allergy information on record.    Objective     Vital Signs   Temp:  [97.3 °F (36.3 °C)-97.8 °F (36.6 °C)] 97.3 °F (36.3 °C)  Heart Rate:  [] 84  Resp:  [16-33] 33  BP: (108-136)/(72-99) 127/99    PPE used per hospital policy    Physical Exam:  Constitutional: Not in acute distress.  Eyes: Injected conjunctivae, EOMI. pupils equal reactive to light.  ENMT: Engle 3. No oral thrush. Tonsils grade  Neck: Trachea midline. No thyromegaly  Heart: RRR, no murmur  Lungs: Equal but diminished air entry throughout.  No audible crackles or wheezing.  Abdomen:  Soft. No tenderness or dullness. No HSM.  Extremities: No cyanosis, clubbing or pitting edema.  Warm extremities and well-perfused.  Neuro: Somnolent but easily arousable.  Moves all extremities.  Psych: Cannot assess  Integumentary: No rash.  Normal skin turgor  Lymphatic: No palpable cervical or supraclavicular lymph nodes.      Diagnostic imaging:  I personally and independently reviewed the following images:   CT brain 5/14/2024: Clear.    Laboratory workup:    Results from last 7 days   Lab Units 05/14/24  0936 05/14/24  0353   SODIUM mmol/L  --  143   POTASSIUM mmol/L  --  4.3   CHLORIDE mmol/L  --  107   CO2 mmol/L  --  27.0   BUN mg/dL  --  11   CREATININE mg/dL 0.81 0.98   GLUCOSE mg/dL  --  78   CALCIUM mg/dL  --  8.5*         Results from last 7 days   Lab Units 05/14/24  0936 05/14/24  0353   WBC 10*3/mm3  --  4.95   HEMOGLOBIN g/dL  --  12.7*   HEMOGLOBIN, POC g/dL 12.8  --    HEMATOCRIT %  --  39.2   HEMATOCRIT POC % 38  --    PLATELETS 10*3/mm3  --  178               Assessment   Acute hypercapnic respiratory failure  Encephalopathy, secondary to hypercapnia and toxic due to intoxication  Polysubstance abuse      Recommendations:    Take off NIPPV.  Check VBG.   "If significant hypercarbia or respiratory acidosis then will resume NIPPV at night only otherwise can keep him off NIPPV.  His mental status improved and his hypercapnia has probably resolved by now or improved significantly.  Counseled against smoking and using drugs  Continue to monitor his mental status.  No need for Narcan for now.  Avoid narcotics      Jose Miguel Hernandez MD  24  14:50 EDT               Electronically signed by Jose Miguel Hernandez MD at 24 3607          Discharge Summary        Leonardo Lanza MD at 24 1420              Patient Name: Davin Booker  : 1990  MRN: [id number]    Date of Admission: 2024  Date of Discharge:  2024  Primary Care Physician: Provider, No Known      Chief Complaint:   Altered Mental Status      Discharge Diagnoses     Active Hospital Problems    Diagnosis  POA    Suicidal behavior [R45.89]  Not Applicable    Polysubstance abuse [F19.10]  Unknown      Resolved Hospital Problems    Diagnosis Date Resolved POA    **Altered mental status [R41.82] 2024 Yes    Acidosis [E87.20] 2024 Unknown    Hypercarbia [R06.89] 2024 Unknown        Admitting HPI     \"This is a 34-year-old male with well-known history of polysubstance abuse who presented to the emergency room via EMS for altered mental status. He was given intranasal Narcan followed by IV Narcan and route to the hospital with transient improvement of symptoms. Here he has remained very somnolent over the past 5 hours and will hardly arouse to intense sternal rubbing. Prior UDS has revealed amphetamines, fentanyl, benzodiazepines, THC. UDS this morning is pending. Patient was accompanied to the hospital by his partner. She is no longer present and has left a handwritten note for him stating that she is leaving him and taking their child with her due to his inability to get clean. \"    Hospital Course     Pt admitted for suicide attempt.  Hypoxic and hypercapnic on admission for " which pulmonology was consulted.  He was weaned to room air.  Seen in consultation with psychiatry who recommended inpatient psychiatric treatment.  Ambulance has been arranged for transfer to the [facility].    Day of Discharge     Physical Exam:  Temp:  [98.2 °F (36.8 °C)-98.4 °F (36.9 °C)] 98.4 °F (36.9 °C)  Heart Rate:  [83-96] 96  Resp:  [16-18] 18  BP: (123-150)/(76-94) 129/94  Body mass index is 28.22 kg/m².  Physical Exam  Constitutional:       Appearance: He is ill-appearing.   Pulmonary:      Effort: Pulmonary effort is normal. No respiratory distress.      Breath sounds: No stridor.   Skin:     Coloration: Skin is not pale.   Neurological:      Mental Status: He is alert and oriented to person, place, and time.         Consultants     Consult Orders (all) (From admission, onward)       Start     Ordered    05/15/24 0246  Inpatient Psychiatrist Consult  Once,   Status:  Canceled        Specialty:  Psychiatry  Provider:  Bertin Irving III, MD    05/15/24 0246    05/14/24 1801  Inpatient Psychiatrist Consult  Once        Specialty:  Psychiatry  Provider:  Bertin Irving III, MD    05/14/24 1800    05/14/24 0943  Inpatient Pulmonology Consult  Once        Specialty:  Pulmonary Disease  Provider:  Josr Pineda MD    05/14/24 0944    05/14/24 0904  Inpatient Access Center Consult  Once        Provider:  (Not yet assigned)    05/14/24 0903    05/14/24 0814  [facility] (on-call MD unless specified) Details  Once        Specialty:  Hospitalist  Provider:  Isaac Adams MD    05/14/24 0813                  Procedures     * Surgery not found *      Imaging Results (All)       Procedure Component Value Units Date/Time    XR Chest 1 View [[id number]] Collected: 05/15/24 1414     Updated: 05/15/24 1417    Narrative:      XR CHEST 1 VW-     HISTORY: Male who is 34 years-old, hypercapnia     TECHNIQUE: Frontal view of the chest     COMPARISON: None available     FINDINGS: Heart, mediastinum  and pulmonary vasculature are unremarkable.  No focal pulmonary consolidation, pleural effusion, or pneumothorax. No  acute osseous process.       Impression:      No evidence for acute pulmonary process. Follow-up as  clinical indications persist.     This report was finalized on 5/15/2024 2:14 PM by Dr. Vikas Dao M.D on Workstation: [id number]       CT Head Without Contrast [[id number]] Collected: 05/14/24 0800     Updated: 05/14/24 1020    Narrative:      CT HEAD WITHOUT CONTRAST     HISTORY: Altered mental status, found down.     COMPARISON: None.     FINDINGS: The brain and ventricles are symmetrical. There is no evidence  of hemorrhage, hydrocephalus, abnormal extra-axial fluid or of a focal  area of decreased attenuation to suggest acute infarction. The  gray-white demarcation is maintained. Bone windows showed no evidence of  a calvarial fracture. Further evaluation could be performed with a MRI  examination of the brain as indicated.     Radiation dose reduction techniques were utilized, including automated  exposure control and exposure modulation based on body size.        This report was finalized on 5/14/2024 10:17 AM by Dr. Varinder Quijano M.D on Workstation: [id number]                 Pertinent Labs     Results from last 7 days   Lab Units 05/15/24  0726 05/14/24  0936 05/14/24  0353   WBC 10*3/mm3 6.00  --  4.95   HEMOGLOBIN g/dL 12.9*  --  12.7*   HEMOGLOBIN, POC g/dL  --  12.8  --    PLATELETS 10*3/mm3 159  --  178     Results from last 7 days   Lab Units 05/15/24  0726 05/14/24  0936 05/14/24  0353   SODIUM mmol/L 141  --  143   POTASSIUM mmol/L 3.7  --  4.3   CHLORIDE mmol/L 108*  --  107   CO2 mmol/L 24.4  --  27.0   BUN mg/dL 3*  --  11   CREATININE mg/dL 0.74* 0.81 0.98   GLUCOSE mg/dL 87  --  78   EGFR mL/min/1.73 121.9  --  103.8     Results from last 7 days   Lab Units 05/14/24  0353   ALBUMIN g/dL 4.2   BILIRUBIN mg/dL 0.6   ALK PHOS U/L 56   AST (SGOT) U/L 25   ALT (SGPT) U/L 17  "    Results from last 7 days   Lab Units 05/15/24  0726 05/14/24  0353   CALCIUM mg/dL 8.1* 8.5*   ALBUMIN g/dL  --  4.2               Invalid input(s): \"LDLCALC\"          Test Results Pending at Discharge     Pending Labs       Order Current Status    Blood Gas, Venous - Collected (05/14/24 1995)            Discharge Details        Discharge Medications        Continue These Medications        Instructions Start Date   LaMICtal 200 MG tablet  Generic drug: lamoTRIgine   200 mg, Oral, 2 Times Daily      lithium 300 MG CR tablet  Commonly known as: LITHOBID   300 mg, Oral, 2 Times Daily      Vraylar 1.5 MG capsule capsule  Generic drug: Cariprazine HCl   1.5 mg, Oral, Daily             Stop These Medications      ALPRAZolam 1 MG tablet  Commonly known as: XANAX     amphetamine-dextroamphetamine 30 MG tablet  Commonly known as: ADDERALL     guanFACINE HCl ER 1 MG tablet sustained-release 24 hour tablet  Commonly known as: INTUNIV              No Known Allergies    Discharge Disposition:  Psychiatric Hospital or Unit ([facility] - [facility] or [facility])      Discharge Diet:  Diet Order   Procedures    Diet: Regular/House; Safe Tray; Fluid Consistency: Thin (IDDSI 0)       Discharge Activity:   Activity Instructions       Activity as Tolerated              CODE STATUS:    Code Status and Medical Interventions:   Ordered at: 05/14/24 0902     Code Status (Patient has no pulse and is not breathing):    CPR (Attempt to Resuscitate)     Medical Interventions (Patient has pulse or is breathing):    Full Support       No future appointments.   Follow-up Information       Provider, No Known .    Contact information:  [facility] [address] [phone]                             Time Spent on Discharge:  Greater than 30 minutes spent on discharge management including final examination, discussion of hospital stay and patient education, preparation of records, medication reconciliation, follow up planning      Leonardo " WENDI Lanza MD  [facility]  05/16/24  14:23 EDT                Electronically signed by Leonardo Lanza MD at 05/16/24 2676

## 2024-06-18 ENCOUNTER — HOSPITAL ENCOUNTER (OUTPATIENT)
Facility: HOSPITAL | Age: 34
Setting detail: OBSERVATION
Discharge: HOME | End: 2024-06-19
Attending: STUDENT IN AN ORGANIZED HEALTH CARE EDUCATION/TRAINING PROGRAM | Admitting: INTERNAL MEDICINE
Payer: MEDICAID

## 2024-06-18 DIAGNOSIS — G25.79 SEROTONIN SYNDROME: Primary | ICD-10-CM

## 2024-06-18 LAB
ALBUMIN SERPL-MCNC: 4.3 G/DL (ref 3.5–5.2)
ALBUMIN/GLOB SERPL: 1.7 G/DL
ALP SERPL-CCNC: 70 U/L (ref 39–117)
ALT SERPL W P-5'-P-CCNC: 21 U/L (ref 1–41)
AMPHET+METHAMPHET UR QL: POSITIVE
ANION GAP SERPL CALCULATED.3IONS-SCNC: 8 MMOL/L (ref 5–15)
APAP SERPL-MCNC: <5 MCG/ML (ref 0–30)
AST SERPL-CCNC: 16 U/L (ref 1–40)
BACTERIA UR QL AUTO: ABNORMAL /HPF
BARBITURATES UR QL SCN: NEGATIVE
BASOPHILS # BLD AUTO: 0.03 10*3/MM3 (ref 0–0.2)
BASOPHILS NFR BLD AUTO: 0.3 % (ref 0–1.5)
BENZODIAZ UR QL SCN: POSITIVE
BILIRUB SERPL-MCNC: 0.4 MG/DL (ref 0–1.2)
BILIRUB UR QL STRIP: NEGATIVE
BUN SERPL-MCNC: 11 MG/DL (ref 6–20)
BUN/CREAT SERPL: 11.2 (ref 7–25)
CALCIUM SPEC-SCNC: 9 MG/DL (ref 8.6–10.5)
CANNABINOIDS SERPL QL: POSITIVE
CHLORIDE SERPL-SCNC: 101 MMOL/L (ref 98–107)
CK SERPL-CCNC: 133 U/L (ref 20–200)
CLARITY UR: CLEAR
CO2 SERPL-SCNC: 29 MMOL/L (ref 22–29)
COCAINE UR QL: NEGATIVE
COLOR UR: YELLOW
CREAT SERPL-MCNC: 0.98 MG/DL (ref 0.76–1.27)
DEPRECATED RDW RBC AUTO: 39.1 FL (ref 37–54)
EGFRCR SERPLBLD CKD-EPI 2021: 103.8 ML/MIN/1.73
EOSINOPHIL # BLD AUTO: 0.14 10*3/MM3 (ref 0–0.4)
EOSINOPHIL NFR BLD AUTO: 1.5 % (ref 0.3–6.2)
ERYTHROCYTE [DISTWIDTH] IN BLOOD BY AUTOMATED COUNT: 12.2 % (ref 12.3–15.4)
ETHANOL BLD-MCNC: <10 MG/DL (ref 0–10)
ETHANOL UR QL: <0.01 %
FENTANYL UR-MCNC: NEGATIVE NG/ML
GLOBULIN UR ELPH-MCNC: 2.5 GM/DL
GLUCOSE SERPL-MCNC: 97 MG/DL (ref 65–99)
GLUCOSE UR STRIP-MCNC: NEGATIVE MG/DL
HCT VFR BLD AUTO: 41.4 % (ref 37.5–51)
HGB BLD-MCNC: 13.9 G/DL (ref 13–17.7)
HGB UR QL STRIP.AUTO: NEGATIVE
HYALINE CASTS UR QL AUTO: ABNORMAL /LPF
IMM GRANULOCYTES # BLD AUTO: 0.04 10*3/MM3 (ref 0–0.05)
IMM GRANULOCYTES NFR BLD AUTO: 0.4 % (ref 0–0.5)
KETONES UR QL STRIP: NEGATIVE
LEUKOCYTE ESTERASE UR QL STRIP.AUTO: ABNORMAL
LITHIUM SERPL-SCNC: 0.3 MMOL/L (ref 0.6–1.2)
LYMPHOCYTES # BLD AUTO: 2.78 10*3/MM3 (ref 0.7–3.1)
LYMPHOCYTES NFR BLD AUTO: 29.4 % (ref 19.6–45.3)
MAGNESIUM SERPL-MCNC: 2.3 MG/DL (ref 1.6–2.6)
MCH RBC QN AUTO: 29.6 PG (ref 26.6–33)
MCHC RBC AUTO-ENTMCNC: 33.6 G/DL (ref 31.5–35.7)
MCV RBC AUTO: 88.3 FL (ref 79–97)
METHADONE UR QL SCN: NEGATIVE
MONOCYTES # BLD AUTO: 0.45 10*3/MM3 (ref 0.1–0.9)
MONOCYTES NFR BLD AUTO: 4.8 % (ref 5–12)
NEUTROPHILS NFR BLD AUTO: 6 10*3/MM3 (ref 1.7–7)
NEUTROPHILS NFR BLD AUTO: 63.6 % (ref 42.7–76)
NITRITE UR QL STRIP: NEGATIVE
NRBC BLD AUTO-RTO: 0 /100 WBC (ref 0–0.2)
OPIATES UR QL: NEGATIVE
OXYCODONE UR QL SCN: NEGATIVE
PH UR STRIP.AUTO: 6 [PH] (ref 5–8)
PLATELET # BLD AUTO: 216 10*3/MM3 (ref 140–450)
PMV BLD AUTO: 10.5 FL (ref 6–12)
POTASSIUM SERPL-SCNC: 3.8 MMOL/L (ref 3.5–5.2)
PROT SERPL-MCNC: 6.8 G/DL (ref 6–8.5)
PROT UR QL STRIP: NEGATIVE
RBC # BLD AUTO: 4.69 10*6/MM3 (ref 4.14–5.8)
RBC # UR STRIP: ABNORMAL /HPF
REF LAB TEST METHOD: ABNORMAL
SALICYLATES SERPL-MCNC: <0.3 MG/DL
SODIUM SERPL-SCNC: 138 MMOL/L (ref 136–145)
SP GR UR STRIP: 1.02 (ref 1–1.03)
SQUAMOUS #/AREA URNS HPF: ABNORMAL /HPF
T4 FREE SERPL-MCNC: 1.32 NG/DL (ref 0.92–1.68)
TSH SERPL DL<=0.05 MIU/L-ACNC: 0.68 UIU/ML (ref 0.27–4.2)
UROBILINOGEN UR QL STRIP: ABNORMAL
WBC # UR STRIP: ABNORMAL /HPF
WBC NRBC COR # BLD AUTO: 9.44 10*3/MM3 (ref 3.4–10.8)

## 2024-06-18 PROCEDURE — G0378 HOSPITAL OBSERVATION PER HR: HCPCS

## 2024-06-18 PROCEDURE — 25010000002 DIAZEPAM PER 5 MG: Performed by: STUDENT IN AN ORGANIZED HEALTH CARE EDUCATION/TRAINING PROGRAM

## 2024-06-18 PROCEDURE — 80307 DRUG TEST PRSMV CHEM ANLYZR: CPT | Performed by: STUDENT IN AN ORGANIZED HEALTH CARE EDUCATION/TRAINING PROGRAM

## 2024-06-18 PROCEDURE — 84439 ASSAY OF FREE THYROXINE: CPT | Performed by: STUDENT IN AN ORGANIZED HEALTH CARE EDUCATION/TRAINING PROGRAM

## 2024-06-18 PROCEDURE — 96374 THER/PROPH/DIAG INJ IV PUSH: CPT

## 2024-06-18 PROCEDURE — 25810000003 SODIUM CHLORIDE 0.9 % SOLUTION: Performed by: INTERNAL MEDICINE

## 2024-06-18 PROCEDURE — 80143 DRUG ASSAY ACETAMINOPHEN: CPT | Performed by: STUDENT IN AN ORGANIZED HEALTH CARE EDUCATION/TRAINING PROGRAM

## 2024-06-18 PROCEDURE — 99291 CRITICAL CARE FIRST HOUR: CPT

## 2024-06-18 PROCEDURE — 85025 COMPLETE CBC W/AUTO DIFF WBC: CPT | Performed by: STUDENT IN AN ORGANIZED HEALTH CARE EDUCATION/TRAINING PROGRAM

## 2024-06-18 PROCEDURE — 81001 URINALYSIS AUTO W/SCOPE: CPT | Performed by: STUDENT IN AN ORGANIZED HEALTH CARE EDUCATION/TRAINING PROGRAM

## 2024-06-18 PROCEDURE — 96376 TX/PRO/DX INJ SAME DRUG ADON: CPT

## 2024-06-18 PROCEDURE — 80053 COMPREHEN METABOLIC PANEL: CPT | Performed by: STUDENT IN AN ORGANIZED HEALTH CARE EDUCATION/TRAINING PROGRAM

## 2024-06-18 PROCEDURE — 80178 ASSAY OF LITHIUM: CPT | Performed by: STUDENT IN AN ORGANIZED HEALTH CARE EDUCATION/TRAINING PROGRAM

## 2024-06-18 PROCEDURE — 93010 ELECTROCARDIOGRAM REPORT: CPT | Performed by: INTERNAL MEDICINE

## 2024-06-18 PROCEDURE — 84443 ASSAY THYROID STIM HORMONE: CPT | Performed by: STUDENT IN AN ORGANIZED HEALTH CARE EDUCATION/TRAINING PROGRAM

## 2024-06-18 PROCEDURE — 83735 ASSAY OF MAGNESIUM: CPT | Performed by: STUDENT IN AN ORGANIZED HEALTH CARE EDUCATION/TRAINING PROGRAM

## 2024-06-18 PROCEDURE — 80179 DRUG ASSAY SALICYLATE: CPT | Performed by: STUDENT IN AN ORGANIZED HEALTH CARE EDUCATION/TRAINING PROGRAM

## 2024-06-18 PROCEDURE — 82550 ASSAY OF CK (CPK): CPT | Performed by: STUDENT IN AN ORGANIZED HEALTH CARE EDUCATION/TRAINING PROGRAM

## 2024-06-18 PROCEDURE — 36415 COLL VENOUS BLD VENIPUNCTURE: CPT

## 2024-06-18 PROCEDURE — 93005 ELECTROCARDIOGRAM TRACING: CPT | Performed by: STUDENT IN AN ORGANIZED HEALTH CARE EDUCATION/TRAINING PROGRAM

## 2024-06-18 PROCEDURE — 82077 ASSAY SPEC XCP UR&BREATH IA: CPT | Performed by: STUDENT IN AN ORGANIZED HEALTH CARE EDUCATION/TRAINING PROGRAM

## 2024-06-18 RX ORDER — ONDANSETRON 2 MG/ML
4 INJECTION INTRAMUSCULAR; INTRAVENOUS EVERY 6 HOURS PRN
Status: DISCONTINUED | OUTPATIENT
Start: 2024-06-18 | End: 2024-06-18

## 2024-06-18 RX ORDER — DIAZEPAM 5 MG/ML
5 INJECTION, SOLUTION INTRAMUSCULAR; INTRAVENOUS ONCE
Status: COMPLETED | OUTPATIENT
Start: 2024-06-18 | End: 2024-06-18

## 2024-06-18 RX ORDER — BISACODYL 10 MG
10 SUPPOSITORY, RECTAL RECTAL DAILY PRN
Status: DISCONTINUED | OUTPATIENT
Start: 2024-06-18 | End: 2024-06-19 | Stop reason: HOSPADM

## 2024-06-18 RX ORDER — POLYETHYLENE GLYCOL 3350 17 G/17G
17 POWDER, FOR SOLUTION ORAL DAILY PRN
Status: DISCONTINUED | OUTPATIENT
Start: 2024-06-18 | End: 2024-06-19 | Stop reason: HOSPADM

## 2024-06-18 RX ORDER — SODIUM CHLORIDE 9 MG/ML
75 INJECTION, SOLUTION INTRAVENOUS CONTINUOUS
Status: DISCONTINUED | OUTPATIENT
Start: 2024-06-18 | End: 2024-06-19 | Stop reason: HOSPADM

## 2024-06-18 RX ORDER — MIDAZOLAM HYDROCHLORIDE 1 MG/ML
1 INJECTION INTRAMUSCULAR; INTRAVENOUS EVERY 4 HOURS PRN
Status: DISCONTINUED | OUTPATIENT
Start: 2024-06-18 | End: 2024-06-19 | Stop reason: HOSPADM

## 2024-06-18 RX ORDER — ONDANSETRON 4 MG/1
4 TABLET, ORALLY DISINTEGRATING ORAL EVERY 6 HOURS PRN
Status: DISCONTINUED | OUTPATIENT
Start: 2024-06-18 | End: 2024-06-18

## 2024-06-18 RX ORDER — BISACODYL 5 MG/1
5 TABLET, DELAYED RELEASE ORAL DAILY PRN
Status: DISCONTINUED | OUTPATIENT
Start: 2024-06-18 | End: 2024-06-19 | Stop reason: HOSPADM

## 2024-06-18 RX ORDER — UREA 10 %
2.5 LOTION (ML) TOPICAL NIGHTLY PRN
Status: DISCONTINUED | OUTPATIENT
Start: 2024-06-18 | End: 2024-06-19 | Stop reason: HOSPADM

## 2024-06-18 RX ORDER — AMOXICILLIN 250 MG
2 CAPSULE ORAL 2 TIMES DAILY PRN
Status: DISCONTINUED | OUTPATIENT
Start: 2024-06-18 | End: 2024-06-19 | Stop reason: HOSPADM

## 2024-06-18 RX ADMIN — DIAZEPAM 5 MG: 5 INJECTION, SOLUTION INTRAMUSCULAR; INTRAVENOUS at 17:31

## 2024-06-18 RX ADMIN — DIAZEPAM 5 MG: 5 INJECTION, SOLUTION INTRAMUSCULAR; INTRAVENOUS at 18:53

## 2024-06-18 RX ADMIN — SODIUM CHLORIDE 75 ML/HR: 9 INJECTION, SOLUTION INTRAVENOUS at 23:55

## 2024-06-18 NOTE — ED PROVIDER NOTES
EMERGENCY DEPARTMENT ENCOUNTER    Room Number:  32/32  PCP: Provider, No Known  History obtained from: Patient      HPI:  Chief Complaint: Generalized tingling  A complete HPI/ROS/PMH/PSH/SH/FH are unobtainable due to: N/A  Context: Davin Booker is a 34 y.o. male who presents to the ED c/o generalized tingling, feeling of muscle spasms.  Involves entire body.  Started earlier today.  Recently had a psychiatric admission after suicide attempts after which his Abilify was increased from 5 mg to 15 mg.  Has also been started on lithium in the last week.  No other recent illness, fever, chills.  Did have a marijuana gummy earlier today.            PAST MEDICAL HISTORY  Active Ambulatory Problems     Diagnosis Date Noted    ADHD (attention deficit hyperactivity disorder)     Bipolar 1 disorder     Pneumonia involving right lung 02/29/2024    LANDON (acute kidney injury) 02/29/2024    CKD (chronic kidney disease) 02/29/2024    CAP (community acquired pneumonia) 04/16/2024    Opioid overdose 04/17/2024    Suicide attempt by drug overdose 04/17/2024    Acute respiratory failure with hypoxia 04/17/2024    Polysubstance abuse 05/14/2024    Suicidal behavior 05/16/2024     Resolved Ambulatory Problems     Diagnosis Date Noted    Altered mental status 05/14/2024    Acidosis 05/14/2024    Hypercarbia 05/14/2024     No Additional Past Medical History         PAST SURGICAL HISTORY  Past Surgical History:   Procedure Laterality Date    TONSILLECTOMY           FAMILY HISTORY  Family History   Problem Relation Age of Onset    No Known Problems Mother     No Known Problems Father          SOCIAL HISTORY  Social History     Socioeconomic History    Marital status:    Tobacco Use    Smoking status: Unknown    Smokeless tobacco: Never   Vaping Use    Vaping status: Every Day    Substances: Nicotine, THC   Substance and Sexual Activity    Alcohol use: Yes     Comment: occasional    Drug use: Yes     Types: Marijuana,  Cocaine(coke)     Comment: heroin    Sexual activity: Defer         ALLERGIES  Patient has no known allergies.        REVIEW OF SYSTEMS    As per HPI      PHYSICAL EXAM  ED Triage Vitals [06/18/24 1705]   Temp Heart Rate Resp BP SpO2   99.2 °F (37.3 °C) (!) 137 18 108/84 98 %      Temp src Heart Rate Source Patient Position BP Location FiO2 (%)   -- -- -- -- --       Physical Exam  Constitutional:       General: He is not in acute distress.  HENT:      Head: Normocephalic and atraumatic.   Eyes:      Comments: Conjunctival injection bilaterally   Cardiovascular:      Rate and Rhythm: Normal rate and regular rhythm.   Pulmonary:      Effort: No respiratory distress.   Abdominal:      General: There is no distension.      Tenderness: There is no abdominal tenderness.   Musculoskeletal:         General: No swelling or deformity.   Skin:     General: Skin is warm and dry.   Neurological:      General: No focal deficit present.      Mental Status: He is alert. Mental status is at baseline.      Comments: Very brisk patellar reflexes bilaterally with 5-10 beats of clonus at the ankles bilaterally           Vital signs and nursing notes reviewed.          LAB RESULTS  Recent Results (from the past 24 hour(s))   Comprehensive Metabolic Panel    Collection Time: 06/18/24  5:23 PM    Specimen: Blood   Result Value Ref Range    Glucose 97 65 - 99 mg/dL    BUN 11 6 - 20 mg/dL    Creatinine 0.98 0.76 - 1.27 mg/dL    Sodium 138 136 - 145 mmol/L    Potassium 3.8 3.5 - 5.2 mmol/L    Chloride 101 98 - 107 mmol/L    CO2 29.0 22.0 - 29.0 mmol/L    Calcium 9.0 8.6 - 10.5 mg/dL    Total Protein 6.8 6.0 - 8.5 g/dL    Albumin 4.3 3.5 - 5.2 g/dL    ALT (SGPT) 21 1 - 41 U/L    AST (SGOT) 16 1 - 40 U/L    Alkaline Phosphatase 70 39 - 117 U/L    Total Bilirubin 0.4 0.0 - 1.2 mg/dL    Globulin 2.5 gm/dL    A/G Ratio 1.7 g/dL    BUN/Creatinine Ratio 11.2 7.0 - 25.0    Anion Gap 8.0 5.0 - 15.0 mmol/L    eGFR 103.8 >60.0 mL/min/1.73   Lithium  Level    Collection Time: 06/18/24  5:23 PM    Specimen: Blood   Result Value Ref Range    Lithium 0.3 (L) 0.6 - 1.2 mmol/L   Ethanol    Collection Time: 06/18/24  5:23 PM    Specimen: Blood   Result Value Ref Range    Ethanol <10 0 - 10 mg/dL    Ethanol % <0.010 %   Acetaminophen Level    Collection Time: 06/18/24  5:23 PM    Specimen: Blood   Result Value Ref Range    Acetaminophen <5.0 0.0 - 30.0 mcg/mL   Salicylate Level    Collection Time: 06/18/24  5:23 PM    Specimen: Blood   Result Value Ref Range    Salicylate <0.3 <=30.0 mg/dL   CK    Collection Time: 06/18/24  5:23 PM    Specimen: Blood   Result Value Ref Range    Creatine Kinase 133 20 - 200 U/L   CBC Auto Differential    Collection Time: 06/18/24  5:23 PM    Specimen: Blood   Result Value Ref Range    WBC 9.44 3.40 - 10.80 10*3/mm3    RBC 4.69 4.14 - 5.80 10*6/mm3    Hemoglobin 13.9 13.0 - 17.7 g/dL    Hematocrit 41.4 37.5 - 51.0 %    MCV 88.3 79.0 - 97.0 fL    MCH 29.6 26.6 - 33.0 pg    MCHC 33.6 31.5 - 35.7 g/dL    RDW 12.2 (L) 12.3 - 15.4 %    RDW-SD 39.1 37.0 - 54.0 fl    MPV 10.5 6.0 - 12.0 fL    Platelets 216 140 - 450 10*3/mm3    Neutrophil % 63.6 42.7 - 76.0 %    Lymphocyte % 29.4 19.6 - 45.3 %    Monocyte % 4.8 (L) 5.0 - 12.0 %    Eosinophil % 1.5 0.3 - 6.2 %    Basophil % 0.3 0.0 - 1.5 %    Immature Grans % 0.4 0.0 - 0.5 %    Neutrophils, Absolute 6.00 1.70 - 7.00 10*3/mm3    Lymphocytes, Absolute 2.78 0.70 - 3.10 10*3/mm3    Monocytes, Absolute 0.45 0.10 - 0.90 10*3/mm3    Eosinophils, Absolute 0.14 0.00 - 0.40 10*3/mm3    Basophils, Absolute 0.03 0.00 - 0.20 10*3/mm3    Immature Grans, Absolute 0.04 0.00 - 0.05 10*3/mm3    nRBC 0.0 0.0 - 0.2 /100 WBC   Magnesium    Collection Time: 06/18/24  5:23 PM    Specimen: Blood   Result Value Ref Range    Magnesium 2.3 1.6 - 2.6 mg/dL   TSH    Collection Time: 06/18/24  5:23 PM    Specimen: Blood   Result Value Ref Range    TSH 0.677 0.270 - 4.200 uIU/mL   T4, Free    Collection Time: 06/18/24  5:23  PM    Specimen: Blood   Result Value Ref Range    Free T4 1.32 0.92 - 1.68 ng/dL   ECG 12 Lead Drug Monitoring    Collection Time: 06/18/24  5:39 PM   Result Value Ref Range    QT Interval 292 ms    QTC Interval 444 ms       Ordered the above labs and reviewed the results.        RADIOLOGY  No Radiology Exams Resulted Within Past 24 Hours    Ordered the above noted radiological studies. Reviewed by me in PACS.            PROCEDURES  Critical Care    Performed by: Rhett Nieves MD  Authorized by: Krysta Hutson MD    Critical care provider statement:     Critical care time (minutes):  36    Critical care time was exclusive of:  Separately billable procedures and treating other patients and teaching time    Critical care was necessary to treat or prevent imminent or life-threatening deterioration of the following conditions:  Toxidrome    Critical care was time spent personally by me on the following activities:  Development of treatment plan with patient or surrogate, discussions with consultants, evaluation of patient's response to treatment, examination of patient, review of old charts, re-evaluation of patient's condition, pulse oximetry, ordering and review of radiographic studies, ordering and review of laboratory studies, ordering and performing treatments and interventions and obtaining history from patient or surrogate    Care discussed with: admitting provider              MEDICATIONS GIVEN IN ER  Medications   diazePAM (VALIUM) injection 5 mg (5 mg Intravenous Given 6/18/24 1731)   diazePAM (VALIUM) injection 5 mg (5 mg Intravenous Given 6/18/24 1853)               MEDICAL DECISION MAKING, PROGRESS, and CONSULTS    MDM: Patient presented emergency department with generalized body symptoms, hyperreflexia, tachycardia.  On multiple serotonergic medications including Lamictal, Adderall, Abilify.  Also recently started on lithium which can exacerbate the effects of serotonergic medications.  Also can  cause hyperreflexic symptoms although lithium level is on the low end of normal.  Labs otherwise reassuring.  Patient improved after benzodiazepines and fluids.  Discussed with Poison Control Center, recommend continued supportive care and holding serotonergic medications temporarily.  Discussed with inpatient team, will admit for further medication management and observation.    All labs have been independently reviewed by me.  All radiology studies have been reviewed by me and I have also reviewed the radiology report.   EKG's independently viewed and interpreted by me.  Discussion below represents my analysis of pertinent findings related to patient's condition, differential diagnosis, treatment plan and final disposition.      Additional sources:  - Discussed/ obtained information from independent historians:      - External (non-ED) record review: Reviewed outside records, patient was admitted to the [facility] after suicide attempt in May.    - Chronic or social conditions impacting care: Bipolar 2    - Shared decision making: Discussed plan for admission, patient agrees.      Orders placed during this visit:  Orders Placed This Encounter   Procedures    Comprehensive Metabolic Panel    Lithium Level    Urinalysis With Microscopic If Indicated (No Culture) - Urine, Clean Catch    Ethanol    Acetaminophen Level    Urine Drug Screen - Urine, Clean Catch    Salicylate Level    CK    Magnesium    CBC Auto Differential    TSH    T4, Free    [facility] (on-call MD unless specified) Details    IP General Consult (Use specialty-specific consult if known)    ECG 12 Lead Drug Monitoring    Inpatient Admission    CBC & Differential         Additional orders considered but not ordered:  Considered CT head however patient has no focal neurologic findings.        Differential diagnosis includes but is not limited to:    Lithium toxicity, serotonin syndrome, neuroleptic malignant syndrome, medication side effect, benzodiazepine  withdrawal, sympathomimetic toxicity      Independent interpretation of labs, radiology studies, and discussions with consultants:  ED Course as of 06/18/24 1948 Tue Jun 18, 2024   1815 Lithium(!): 0.3 [FS]   1830 EKG interpreted myself:  1739, sinus tachycardia rate of 139, no acute ST segment changes or T wave inversions.  QTc 444 ms. [FS]      ED Course User Index  [FS] Rhett Nieves MD           DIAGNOSIS  Final diagnoses:   Serotonin syndrome         DISPOSITION  Admitted to telemetry        Latest Documented Vital Signs:  As of 19:48 EDT  BP- 134/95 HR- (!) 145 Temp- 99.2 °F (37.3 °C) O2 sat- 100%              --    Please note that portions of this were completed with a voice recognition program.       Note Disclaimer: At [facility], we believe that sharing information builds trust and better relationships. You are receiving this note because you are receiving care at [facility] or recently visited. It is possible you will see health information before a provider has talked with you about it. This kind of information can be easy to misunderstand. To help you fully understand what it means for your health, we urge you to discuss this note with your provider.             Rhett Nieves MD  06/18/24 1951

## 2024-06-18 NOTE — ED TRIAGE NOTES
Pt from a coffee shop via ems, called for tremors/shaking/tingling, concerned for panic attack, recently stopped ativan and started taking lithium

## 2024-06-18 NOTE — ED NOTES
Report received from OCTAVIO Roque. Pt given another sandwich and water to drink. Pt pleasant, appropriate, and denies any further needs. Awaiting admission assignment.

## 2024-06-18 NOTE — ED NOTES
Nursing report ED to floor  Davin Booker  34 y.o.  male    HPI :  HPI (Adult)  Stated Reason for Visit: med reaction  History Obtained From: EMS    Chief Complaint  Chief Complaint   Patient presents with    Medication Reaction    Anxiety       Admitting doctor:   Krysta Hutson MD    Admitting diagnosis:   The encounter diagnosis was Serotonin syndrome.    Code status:   Current Code Status       Date Active Code Status Order ID Comments User Context       Prior            Allergies:   Patient has no known allergies.    Isolation:   No active isolations    Intake and Output    Intake/Output Summary (Last 24 hours) at 6/18/2024 1935  Last data filed at 6/18/2024 1703  Gross per 24 hour   Intake 100 ml   Output --   Net 100 ml       Weight:       06/18/24  1728   Weight: 81 kg (178 lb 9.2 oz)       Most recent vitals:   Vitals:    06/18/24 1746 06/18/24 1801 06/18/24 1818 06/18/24 1831   BP:  134/89  134/95   Pulse: (!) 136 (!) 139 (!) 136 (!) 145   Resp:       Temp:       SpO2: 100% 99% 100% 100%   Weight:       Height:           Active LDAs/IV Access:   Lines, Drains & Airways       Active LDAs       Name Placement date Placement time Site Days    Peripheral IV 06/18/24 1706 Left Antecubital 06/18/24  1706  Antecubital  less than 1                    Labs (abnormal labs have a star):   Labs Reviewed   LITHIUM LEVEL - Abnormal; Notable for the following components:       Result Value    Lithium 0.3 (*)     All other components within normal limits   CBC WITH AUTO DIFFERENTIAL - Abnormal; Notable for the following components:    RDW 12.2 (*)     Monocyte % 4.8 (*)     All other components within normal limits   ACETAMINOPHEN LEVEL - Normal   SALICYLATE LEVEL - Normal    Narrative:     Therapeutic range for Salicylates:  3.0 - 10.0 mg/dL for antipyretic/analgesic conditions  15.0 - 30.0 mg/dL for anti-inflammatory conditions   CK - Normal   MAGNESIUM - Normal   TSH - Normal   T4, FREE - Normal    COMPREHENSIVE METABOLIC PANEL    Narrative:     GFR Normal >60  Chronic Kidney Disease <60  Kidney Failure <15     ETHANOL   URINALYSIS W/ MICROSCOPIC IF INDICATED (NO CULTURE)   URINE DRUG SCREEN   CBC AND DIFFERENTIAL    Narrative:     The following orders were created for panel order CBC & Differential.  Procedure                               Abnormality         Status                     ---------                               -----------         ------                     CBC Auto Differential[[id number]]        Abnormal            Final result                 Please view results for these tests on the individual orders.       EKG:   ECG 12 Lead Drug Monitoring   Preliminary Result   HEART RATE= 139  bpm   RR Interval= 432  ms   CA Interval= 135  ms   P Horizontal Axis= -16  deg   P Front Axis= 70  deg   QRSD Interval= 88  ms   QT Interval= 292  ms   QTcB= 444  ms   QRS Axis= 57  deg   T Wave Axis= 44  deg   - OTHERWISE NORMAL ECG -   Sinus tachycardia   Electronically Signed By:    Date and Time of Study: 2024-06-18 17:39:08          Meds given in ED:   Medications   diazePAM (VALIUM) injection 5 mg (5 mg Intravenous Given 6/18/24 1731)   diazePAM (VALIUM) injection 5 mg (5 mg Intravenous Given 6/18/24 1853)       Imaging results:  No radiology results for the last day    Ambulatory status:   - up ad avinash    Social issues:   Social History     Socioeconomic History    Marital status:    Tobacco Use    Smoking status: Unknown    Smokeless tobacco: Never   Vaping Use    Vaping status: Every Day    Substances: Nicotine, THC   Substance and Sexual Activity    Alcohol use: Yes     Comment: occasional    Drug use: Yes     Types: Marijuana, Cocaine(coke)     Comment: heroin    Sexual activity: Defer       Peripheral Neurovascular  Peripheral Neurovascular (Adult)  Peripheral Neurovascular WDL: WDL    Neuro Cognitive  Neuro Cognitive (Adult)  Cognitive/Neuro/Behavioral WDL: WDL, all (pt states he was at a  coffee shop and started to feel shakey, anxious, hx of anxiety, panic attacks, biopolar 2)  Level of Consciousness: Alert  Arousal Level: opens eyes spontaneously  Orientation: oriented x 4  Speech: clear, spontaneous, logical  Mood/Behavior: cooperative, calm  Motor Response  Motor Response: general motor response  General Motor Response: purposeful motor response    Learning  Learning Assessment (Adult)  Learning Readiness and Ability: no barriers identified    Respiratory  Respiratory WDL  Respiratory WDL: WDL    Abdominal Pain       Pain Assessments  Pain (Adult)  (0-10) Pain Rating: Rest: 0  (0-10) Pain Rating: Activity: 0    NIH Stroke Scale       Tati López RN  06/18/24 19:35 EDT

## 2024-06-19 ENCOUNTER — READMISSION MANAGEMENT (OUTPATIENT)
Dept: CALL CENTER | Facility: HOSPITAL | Age: 34
End: 2024-06-19
Payer: MEDICAID

## 2024-06-19 VITALS
WEIGHT: 178.57 LBS | TEMPERATURE: 98.2 F | BODY MASS INDEX: 28.03 KG/M2 | HEART RATE: 82 BPM | HEIGHT: 67 IN | SYSTOLIC BLOOD PRESSURE: 102 MMHG | RESPIRATION RATE: 18 BRPM | OXYGEN SATURATION: 100 % | DIASTOLIC BLOOD PRESSURE: 70 MMHG

## 2024-06-19 LAB
ANION GAP SERPL CALCULATED.3IONS-SCNC: 8 MMOL/L (ref 5–15)
BUN SERPL-MCNC: 16 MG/DL (ref 6–20)
BUN/CREAT SERPL: 18 (ref 7–25)
CALCIUM SPEC-SCNC: 8.6 MG/DL (ref 8.6–10.5)
CHLORIDE SERPL-SCNC: 106 MMOL/L (ref 98–107)
CO2 SERPL-SCNC: 29 MMOL/L (ref 22–29)
CREAT SERPL-MCNC: 0.89 MG/DL (ref 0.76–1.27)
DEPRECATED RDW RBC AUTO: 40.8 FL (ref 37–54)
EGFRCR SERPLBLD CKD-EPI 2021: 115.3 ML/MIN/1.73
ERYTHROCYTE [DISTWIDTH] IN BLOOD BY AUTOMATED COUNT: 12.3 % (ref 12.3–15.4)
GLUCOSE SERPL-MCNC: 87 MG/DL (ref 65–99)
HCT VFR BLD AUTO: 40.5 % (ref 37.5–51)
HGB BLD-MCNC: 13.1 G/DL (ref 13–17.7)
MCH RBC QN AUTO: 29.1 PG (ref 26.6–33)
MCHC RBC AUTO-ENTMCNC: 32.3 G/DL (ref 31.5–35.7)
MCV RBC AUTO: 90 FL (ref 79–97)
PLATELET # BLD AUTO: 210 10*3/MM3 (ref 140–450)
PMV BLD AUTO: 10.7 FL (ref 6–12)
POTASSIUM SERPL-SCNC: 3.8 MMOL/L (ref 3.5–5.2)
QT INTERVAL: 292 MS
QTC INTERVAL: 444 MS
RBC # BLD AUTO: 4.5 10*6/MM3 (ref 4.14–5.8)
SODIUM SERPL-SCNC: 143 MMOL/L (ref 136–145)
WBC NRBC COR # BLD AUTO: 8.55 10*3/MM3 (ref 3.4–10.8)

## 2024-06-19 PROCEDURE — 80048 BASIC METABOLIC PNL TOTAL CA: CPT | Performed by: INTERNAL MEDICINE

## 2024-06-19 PROCEDURE — 85027 COMPLETE CBC AUTOMATED: CPT | Performed by: INTERNAL MEDICINE

## 2024-06-19 PROCEDURE — G0378 HOSPITAL OBSERVATION PER HR: HCPCS

## 2024-06-19 PROCEDURE — 36415 COLL VENOUS BLD VENIPUNCTURE: CPT | Performed by: INTERNAL MEDICINE

## 2024-06-19 NOTE — H&P
HISTORY AND PHYSICAL   [facility]        Date of Admission: 2024  Patient Identification:  Name: Davin Booker  Age: 34 y.o.  Sex: male  :  1990  MRN: [id number]                     Primary Care Physician: Provider, No Known    Chief Complaint:  34 year old gentleman presented to the emergency room with jerking of his feet and ankles , tingling, muscle spasms involving his entire body and weakness; he was recently admitted to inpatient psychiatry after a suicide attempt; his abilify dose was increased; he denies fever or chills; no sick contacts    History of Present Illness:   As above    Past Medical History:  Past Medical History:   Diagnosis Date    ADHD (attention deficit hyperactivity disorder)     Bipolar 1 disorder     Bipolar 1 disorder      Past Surgical History:  Past Surgical History:   Procedure Laterality Date    TONSILLECTOMY        Home Meds:  Medications Prior to Admission   Medication Sig Dispense Refill Last Dose    ARIPiprazole (ABILIFY) 2 MG tablet Take 7.5 tablets by mouth Daily.   2024    lamoTRIgine (LaMICtal) 200 MG tablet Take 1 tablet by mouth Daily.   2024    lithium (LITHOBID) 300 MG CR tablet Take 1 tablet by mouth 2 (Two) Times a Day.   2024    amphetamine-dextroamphetamine XR (Adderall XR) 30 MG 24 hr capsule Take 20 mg by mouth 2 (Two) Times a Day       Cariprazine HCl (Vraylar) 1.5 MG capsule capsule Take 1 capsule by mouth Daily. (Patient not taking: Reported on 2024)   Not Taking    clonazePAM (KlonoPIN) 1 MG tablet  (Patient not taking: Reported on 2024)   Not Taking    lamoTRIgine (LaMICtal) 100 MG tablet Take 2 tablets by mouth Daily. (Patient not taking: Reported on 2024)   Not Taking       Allergies:  No Known Allergies  Immunizations:    There is no immunization history on file for this patient.  Social History:   Social History     Social History Narrative    ** Merged History Encounter **          Social History  "    Socioeconomic History    Marital status:    Tobacco Use    Smoking status: Some Days     Types: Cigarettes    Smokeless tobacco: Never   Vaping Use    Vaping status: Every Day    Substances: Nicotine, THC   Substance and Sexual Activity    Alcohol use: Yes     Comment: occasional    Drug use: Yes     Types: Marijuana     Comment: heroin    Sexual activity: Defer       Family History:  Family History   Problem Relation Age of Onset    No Known Problems Mother     No Known Problems Father         Review of Systems  See history of present illness and past medical history.  Patient denies headache, dizziness, syncope, falls, trauma, change in vision, change in hearing, change in taste, changes in weight, changes in appetite, focal weakness, numbness, or paresthesia.  Patient denies chest pain, palpitations, dyspnea, orthopnea, PND, cough, sinus pressure, rhinorrhea, epistaxis, hemoptysis, nausea, vomiting,hematemesis, diarrhea, constipation or hematochezia.  Denies cold or heat intolerance, polydipsia, polyuria, polyphagia. Denies hematuria, pyuria, dysuria, hesitancy, frequency or urgency. Denies consumption of raw and under cooked meats foods or change in water source.     Objective:  T Max 24 hrs: Temp (24hrs), Av.9 °F (37.2 °C), Min:98.6 °F (37 °C), Max:99.2 °F (37.3 °C)    Vitals Ranges:   Temp:  [98.6 °F (37 °C)-99.2 °F (37.3 °C)] 98.6 °F (37 °C)  Heart Rate:  [127-145] 127  Resp:  [18] 18  BP: (108-140)/(77-95) 118/77      Exam:  /77 (BP Location: Right arm, Patient Position: Sitting)   Pulse (!) 127   Temp 98.6 °F (37 °C) (Oral)   Resp 18   Ht 170.2 cm (67\")   Wt 81 kg (178 lb 9.2 oz)   SpO2 99%   BMI 27.97 kg/m²     General Appearance:    Alert, cooperative, no distress, appears stated age   Head:    Normocephalic, without obvious abnormality, atraumatic   Eyes:    PERRL, conjunctivae/corneas clear, EOM's intact, both eyes   Ears:    Normal external ear canals, both ears   Nose:   " Nares normal, septum midline, mucosa normal, no drainage    or sinus tenderness   Throat:   Lips, mucosa, and tongue normal   Neck:   Supple, symmetrical, trachea midline, no adenopathy;     thyroid:  no enlargement/tenderness/nodules; no carotid    bruit or JVD   Back:     Symmetric, no curvature, ROM normal, no CVA tenderness   Lungs:     Clear to auscultation bilaterally, respirations unlabored   Chest Wall:    No tenderness or deformity    Heart:    Regular rate and rhythm, S1 and S2 normal, no murmur, rub   or gallop   Abdomen:     Soft, nontender, bowel sounds active all four quadrants,     no masses, no hepatomegaly, no splenomegaly   Extremities:   Extremities normal, atraumatic, no cyanosis or edema      .    Data Review:  Labs in chart were reviewed.  WBC   Date Value Ref Range Status   06/18/2024 9.44 3.40 - 10.80 10*3/mm3 Final     Hemoglobin   Date Value Ref Range Status   06/18/2024 13.9 13.0 - 17.7 g/dL Final     Hematocrit   Date Value Ref Range Status   06/18/2024 41.4 37.5 - 51.0 % Final     Platelets   Date Value Ref Range Status   06/18/2024 216 140 - 450 10*3/mm3 Final     Sodium   Date Value Ref Range Status   06/18/2024 138 136 - 145 mmol/L Final     Potassium   Date Value Ref Range Status   06/18/2024 3.8 3.5 - 5.2 mmol/L Final     Chloride   Date Value Ref Range Status   06/18/2024 101 98 - 107 mmol/L Final     CO2   Date Value Ref Range Status   06/18/2024 29.0 22.0 - 29.0 mmol/L Final     BUN   Date Value Ref Range Status   06/18/2024 11 6 - 20 mg/dL Final     Creatinine   Date Value Ref Range Status   06/18/2024 0.98 0.76 - 1.27 mg/dL Final     Glucose   Date Value Ref Range Status   06/18/2024 97 65 - 99 mg/dL Final     Calcium   Date Value Ref Range Status   06/18/2024 9.0 8.6 - 10.5 mg/dL Final     Magnesium   Date Value Ref Range Status   06/18/2024 2.3 1.6 - 2.6 mg/dL Final       Results from last 7 days   Lab Units 06/18/24  1723   TSH uIU/mL 0.677   FREE T4 ng/dL 1.32           Imaging Results (All)       None              Assessment:  Active Hospital Problems    Diagnosis  POA    **Serotonin syndrome [G25.79]  Yes      Resolved Hospital Problems   No resolved problems to display.   Bipolar disorder    Plan:  Psychiatry to see  Fluids  Hold meds  Continue prn benzos  Poison control was contacted by the ED  Dw patient and ed provider    Krysta Hutson MD  6/18/2024  22:49 EDT

## 2024-06-19 NOTE — CONSULTS
"IDENTIFYING INFORMATION: The patient is a 34-year-old white male admitted with symptoms thought to have been related to a possible \"serotonin syndrome\".    CHIEF COMPLAINT: None given    INFORMANT: Patient and chart    RELIABILITY: Limited    HISTORY OF PRESENT ILLNESS: The patient is a 34-year-old white male seen previously by this physician on 2 occasions related to suicide attempts.  After yet another suicide attempt the patient was psychiatrically hospitalized at the [facility] under the care of Dr. Wan Dao last week.  His currently prescribed psychotropic medication regimen includes Adderall XR Abilify Lamictal and lithium which was recently started.  His lithium level on admission was 0.3, which is subtherapeutic.  The patient has an extensive substance abuse history as well as a history of multiple previous suicide attempts.  He is followed by Dr. Tomlin in [facility] for treatment of a bipolar spectrum disorder but also has significant substance use issues as well as characterologic pathology.  The patient presented to the hospital with complaints of jerking movements in his extremities, tremors, tingling and shaking in his extremities.  During his last hospitalization at the [facility], the patient was started on Abilify and Vraylar and Klonopin were reportedly discontinued.  He continues to take Lamictal and Adderall and was also started on lithium.  The patient now denies any strange sensations in his extremities and no tremor is noted nor any other abnormal movements.  He exhibits no clouding of consciousness and is afebrile.  He exhibits no confusion or agitation or muscle rigidity.  He did does not exhibit heavy sweating diarrhea headache shivering or goosebumps.  He exhibits none of the stigmata of serotonin syndrome which is unsurprising as he is on no directly serotonergic  acting agent.    PAST PSYCHIATRIC HISTORY: Reviewed no changes    PAST MEDICAL HISTORY: Reviewed no " changes    MEDICATIONS: Adderall XR, Abilify, Lamictal, lithium    ALLERGIES: None    FAMILY HISTORY: Reviewed no changes    SOCIAL HISTORY: UDS positive for amphetamines, benzodiazepines and cannabis    MENTAL STATUS EXAM: The patient is well-developed well-nourished white male appearing stated age.  He is in apparent physical distress at the time of examination.  He is awake alert and oriented all spheres.  His mood is euthymic his affect full range.  Speech is generally relevant and coherent.  There are no gross deficits in memory or cognition noted.  Intelligence is judged to be in the average range based on fund of knowledge, the patient is cooperative Athen review.  He denies current suicidal or homicidal ideation psychotic features judgement and insight appear to be reasonably intact.  No signs of substance withdrawal are noted.  Vital signs: Blood pressure: 108/63, heart rate: 93    ASSETS/LIABILITIES: To be assessed/lack of investment in treatment, ongoing abuse of psychoactive substances    DIAGNOSTIC IMPRESSION: Psychostimulant use disorder, opioid use disorder, cannabis use disorder, bipolar disorder unspecified, ADHD per patient history, borderline personality traits versus disorder    PLAN: The patient is not exhibiting stigmata of serotonin syndrome.  At this point he denies any tingling sensations or jerking motions, exhibits no confusion and his vital signs are stable.  Notably, he is on no directly serotonergic reacting agents apart from very low-dose Abilify which is not associated with serotonin syndrome (apart from a couple of case studies where, when combined with high dose fluoxetine, serotonin syndrome was observed).  His symptoms may well be related to sudden discontinuation of Ativan (though the patient's drug screen remains positive for benzodiazepines).  It is unlikely that lithium is causing his symptoms as his dose is currently subtherapeutic.  I would recommend that the patient consult  with his attending physician Dr. Tomlin if the symptoms recur or persist.

## 2024-06-19 NOTE — NURSING NOTE
Received a call from the [facility] from [facility] Bazzi following up on the previous call made regarding this patient. Latest vital signs reported. Patient is no longer having muscle jerking movements. She stated she would follow up again tomorrow.

## 2024-06-19 NOTE — DISCHARGE SUMMARY
PHYSICIAN DISCHARGE SUMMARY                                                                        [facility]    Patient Identification:  Name: Davin Booker  Age: 34 y.o.  Sex: male  :  1990  MRN: [id number]  Primary Care Physician: Provider, No Known    Admit date: 2024  Discharge date and time: 2024     Discharged Condition: good    Discharge Diagnoses:  Sinus tachycardia  Polysubstance abuse  Bipolar 1 disorder    Hospital Course:  34-year-old gentleman with longstanding psychiatric history as well as history of polysubstance abuse presents emergency room complaining of generalized tingling, shaking and muscle spasms.  Initial concern with EMS was a panic attack.  It is reported that he had recently been taken off benzodiazepines.  On presentation emergency room sinus tachycardia was present.  Lab workup was unremarkable.  Initially, apparently, there were concerns of serotonin syndrome.  Patient was admitted and observed overnight.  He remained afebrile vital signs stable.  Symptomatology resolved.  He was seen by psychiatry the following morning.  At this point serotonin syndrome seems highly unlikely no new symptoms as well as his medication list.  Panic attack is certainly possibility as well as benzodiazepine withdrawal.  He does have a history of polysubstance abuse and once again had a positive drug screen for cannabinoids.  Also positive for amphetamines which very likely may be associated with his Adderall use.  Positive for benzodiazepines but it appears this was given and route.  He has remained afebrile vital signs stable asymptomatic today.  He can be discharged on the same medications as prior to admission he needs to follow-up with his primary psychiatrist.  Patient urged to discontinue recreational drugs.    Consults:     Consults       Date and Time Order Name Status Description    2024  10:55 PM Inpatient Psychiatrist Consult Completed     6/18/2024  7:02 PM IP General Consult (Use specialty-specific consult if known)      6/18/2024  7:02 PM [facility] (on-call MD unless specified) Details                Discharge Exam:  Afebrile vital signs stable.  Well-developed well-nourished male in no apparent distress.  Lungs clear to auscultation good air movement.  Heart regular rate and rhythm.  Extremities no Kumm sinus edema.  Alert oriented conversant cooperative.     Disposition:  Home    Patient Instructions:      Discharge Medications        Changes to Medications        Instructions Start Date   LaMICtal 200 MG tablet  Generic drug: lamoTRIgine  What changed: Another medication with the same name was removed. Continue taking this medication, and follow the directions you see here.   200 mg, Oral, Daily             Continue These Medications        Instructions Start Date   Adderall XR 30 MG 24 hr capsule  Generic drug: amphetamine-dextroamphetamine XR   20 mg, Oral, 2 Times Daily      ARIPiprazole 2 MG tablet  Commonly known as: ABILIFY   15 mg, Oral, Daily      lithium 300 MG CR tablet  Commonly known as: LITHOBID   300 mg, Oral, 2 Times Daily             Stop These Medications      clonazePAM 1 MG tablet  Commonly known as: KlonoPIN     Vraylar 1.5 MG capsule capsule  Generic drug: Cariprazine HCl            Diet Instructions       Diet: Regular/House Diet; Thin (IDDSI 0)      Discharge Diet: Regular/House Diet    Fluid Consistency: Thin (IDDSI 0)          No future appointments.  Additional Instructions for the Follow-ups that You Need to Schedule       Discharge Follow-up with PCP   As directed       Currently Documented PCP:    Provider, No Known    PCP Phone Number:    [phone]     Follow Up Details: 1 week        Discharge Follow-up with Specialty: Primary psychiatrist   As directed      Specialty: Primary psychiatrist   Follow Up Details: Call for appointment on discharge.                Follow-up Information       Provider, No Known .    Why: 1 week  Contact information:  [facility] [id number]  [phone]                           Discharge Order (From admission, onward)       Start     Ordered    06/19/24 1723  Discharge patient  Once        Expected Discharge Date: 06/19/24   Discharge Disposition: Home or Self Care   Physician of Record for Attribution - Please select from Treatment Team: ARPIT MARTE [[id number]]   Review needed by CMO to determine Physician of Record: No      Question Answer Comment   Physician of Record for Attribution - Please select from Treatment Team ARPIT MARTE.    Review needed by CMO to determine Physician of Record No        06/19/24 1727                      Total time spent discharging patient including evaluation,post hospitalization follow up,  medication and post hospitalization instructions and education total time exceeds 30 minutes.    Signed:  Arpit Marte MD  6/19/2024  17:29 EDT    EMR Dragon/Transcription disclaimer:   Much of this encounter note is an electronic transcription/translation of spoken language to printed text. The electronic translation of spoken language may permit erroneous, or at times, nonsensical words or phrases to be inadvertently transcribed; Although I have reviewed the note for such errors, some may still exist.

## 2024-06-19 NOTE — PLAN OF CARE
Goal Outcome Evaluation:  Plan of Care Reviewed With: patient        Progress: improving  Outcome Evaluation: New admission last night for serotonin syndrome. No c/o muscle twitching or jerking movements tonight. No need for PRN versed. Fluids initiated. Vitals stable. Was tachycardic on arrival but now is back to HR in 80s-90s which patient reports is closer to his baseline. Sleeping all night.

## 2024-06-19 NOTE — CASE MANAGEMENT/SOCIAL WORK
Discharge Planning Assessment  [facility]     Patient Name: Davin Booker  MRN: [id number]  Today's Date: 6/19/2024    Admit Date: 6/18/2024    Plan: Plans to [facility]. Denies any needs.       Discharge Plan       Row Name 06/19/24 1651       Plan    Plan Plans to go [facility]. Denies any needs.    Patient/Family in Agreement with Plan yes    Plan Comments Attempted to complete screen several times. Pt in bed with eyes closed and head turned away from road. He did not respond to knock on door or CCP voice. Nurse states is mad because he wants to be discharged and he is having to wait for attending to see. Lobito Elliott RN-BC                  Continued Care and Services - Admitted Since 6/18/2024    No active coordination exists for this encounter.       Selected Continued Care - Prior Encounters Includes continued care and service providers with selected services from prior encounters from 3/20/2024 to 6/19/2024      Discharged on 4/19/2024 Admission date: 4/16/2024 - Discharge disposition: Psychiatric Hospital or Unit (DC - External or [facility])      Destination       Service Provider Selected Services Address Phone Fax Patient Preferred    [facility] [address] [facility] [address] [phone] --                          Expected Discharge Date and Time       Expected Discharge Date Expected Discharge Time    Jun 20, 2024            Demographic Summary    No documentation.                  Functional Status    No documentation.                  Psychosocial    No documentation.                  Abuse/Neglect    No documentation.                  Legal    No documentation.                  Substance Abuse    No documentation.                  Patient Forms    No documentation.                     Lobito Elliott RN

## 2024-06-20 NOTE — OUTREACH NOTE
Prep Survey      Flowsheet Row Responses   [facility] facility patient discharged from? [place]   Is LACE score < 7 ? No   Eligibility Readm Mgmt   Discharge diagnosis Sinus tachycardia   Does the patient have one of the following disease processes/diagnoses(primary or secondary)? Other   Does the patient have Home health ordered? No   Is there a DME ordered? No   Prep survey completed? Yes            Dominique FUNG - Registered Nurse

## 2024-06-24 ENCOUNTER — READMISSION MANAGEMENT (OUTPATIENT)
Dept: CALL CENTER | Facility: HOSPITAL | Age: 34
End: 2024-06-24
Payer: MEDICAID

## 2024-06-24 NOTE — OUTREACH NOTE
Medical Week 1 Survey      Flowsheet Row Responses   [facility] patient discharged from? [place]   Does the patient have one of the following disease processes/diagnoses(primary or secondary)? Other   Week 1 attempt successful? No   Unsuccessful attempts Attempt 1            Taylor CHAMPION - Licensed Nurse

## 2024-06-27 ENCOUNTER — READMISSION MANAGEMENT (OUTPATIENT)
Dept: CALL CENTER | Facility: HOSPITAL | Age: 34
End: 2024-06-27
Payer: MEDICAID

## 2024-06-27 NOTE — OUTREACH NOTE
Medical Week 1 Survey      Flowsheet Row Responses   [facility] patient discharged from? [place]   Does the patient have one of the following disease processes/diagnoses(primary or secondary)? Other   Week 1 attempt successful? No   Unsuccessful attempts Attempt 2            Karin FUNG - Registered Nurse

## 2024-07-02 ENCOUNTER — READMISSION MANAGEMENT (OUTPATIENT)
Dept: CALL CENTER | Facility: HOSPITAL | Age: 34
End: 2024-07-02
Payer: MEDICAID
